# Patient Record
Sex: FEMALE | Race: WHITE | Employment: OTHER
[De-identification: names, ages, dates, MRNs, and addresses within clinical notes are randomized per-mention and may not be internally consistent; named-entity substitution may affect disease eponyms.]

---

## 2017-01-19 ENCOUNTER — TELEPHONE (OUTPATIENT)
Dept: FAMILY MEDICINE CLINIC | Facility: CLINIC | Age: 82
End: 2017-01-19

## 2017-03-06 RX ORDER — LOSARTAN POTASSIUM 50 MG/1
25 TABLET ORAL 2 TIMES DAILY
Qty: 90 TABLET | Refills: 1 | Status: SHIPPED | OUTPATIENT
Start: 2017-03-06 | End: 2017-07-31 | Stop reason: SDUPTHER

## 2017-03-06 RX ORDER — CLOBETASOL PROPIONATE 0.5 MG/G
OINTMENT TOPICAL
Qty: 15 G | Refills: 1 | Status: SHIPPED | OUTPATIENT
Start: 2017-03-06 | End: 2017-03-08 | Stop reason: SDUPTHER

## 2017-03-08 ENCOUNTER — OFFICE VISIT (OUTPATIENT)
Dept: FAMILY MEDICINE CLINIC | Facility: CLINIC | Age: 82
End: 2017-03-08

## 2017-03-08 VITALS
HEART RATE: 80 BPM | OXYGEN SATURATION: 98 % | WEIGHT: 153 LBS | SYSTOLIC BLOOD PRESSURE: 110 MMHG | DIASTOLIC BLOOD PRESSURE: 68 MMHG | BODY MASS INDEX: 28.91 KG/M2

## 2017-03-08 DIAGNOSIS — E78.00 PURE HYPERCHOLESTEROLEMIA: ICD-10-CM

## 2017-03-08 DIAGNOSIS — I10 ESSENTIAL HYPERTENSION: Primary | ICD-10-CM

## 2017-03-08 PROCEDURE — G8484 FLU IMMUNIZE NO ADMIN: HCPCS | Performed by: FAMILY MEDICINE

## 2017-03-08 PROCEDURE — 4040F PNEUMOC VAC/ADMIN/RCVD: CPT | Performed by: FAMILY MEDICINE

## 2017-03-08 PROCEDURE — G8427 DOCREV CUR MEDS BY ELIG CLIN: HCPCS | Performed by: FAMILY MEDICINE

## 2017-03-08 PROCEDURE — 1090F PRES/ABSN URINE INCON ASSESS: CPT | Performed by: FAMILY MEDICINE

## 2017-03-08 PROCEDURE — G8420 CALC BMI NORM PARAMETERS: HCPCS | Performed by: FAMILY MEDICINE

## 2017-03-08 PROCEDURE — 99213 OFFICE O/P EST LOW 20 MIN: CPT | Performed by: FAMILY MEDICINE

## 2017-03-08 PROCEDURE — 1123F ACP DISCUSS/DSCN MKR DOCD: CPT | Performed by: FAMILY MEDICINE

## 2017-03-08 PROCEDURE — 1036F TOBACCO NON-USER: CPT | Performed by: FAMILY MEDICINE

## 2017-03-08 ASSESSMENT — ENCOUNTER SYMPTOMS: SHORTNESS OF BREATH: 0

## 2017-03-10 RX ORDER — LEVOTHYROXINE SODIUM 0.07 MG/1
75 TABLET ORAL DAILY
Qty: 90 TABLET | Refills: 1 | Status: SHIPPED | OUTPATIENT
Start: 2017-03-10 | End: 2017-09-01 | Stop reason: SDUPTHER

## 2017-03-10 RX ORDER — ATORVASTATIN CALCIUM 10 MG/1
10 TABLET, FILM COATED ORAL NIGHTLY
Qty: 90 TABLET | Refills: 1 | Status: SHIPPED | OUTPATIENT
Start: 2017-03-10 | End: 2017-07-31 | Stop reason: SDUPTHER

## 2017-03-10 RX ORDER — CLOBETASOL PROPIONATE 0.5 MG/G
OINTMENT TOPICAL
Qty: 15 G | Refills: 1 | Status: SHIPPED | OUTPATIENT
Start: 2017-03-10 | End: 2018-11-27 | Stop reason: ALTCHOICE

## 2017-03-10 RX ORDER — ALLOPURINOL 100 MG/1
100 TABLET ORAL 2 TIMES DAILY
Qty: 180 TABLET | Refills: 1 | Status: SHIPPED | OUTPATIENT
Start: 2017-03-10 | End: 2017-09-20 | Stop reason: SDUPTHER

## 2017-03-19 ASSESSMENT — ENCOUNTER SYMPTOMS
VOMITING: 0
BACK PAIN: 0
ABDOMINAL PAIN: 0
CONSTIPATION: 0
DIARRHEA: 0
WHEEZING: 0
NAUSEA: 0
ABDOMINAL DISTENTION: 0
BLURRED VISION: 0
ORTHOPNEA: 0
TROUBLE SWALLOWING: 0
COUGH: 0
COLOR CHANGE: 0
PHOTOPHOBIA: 0

## 2017-04-04 ENCOUNTER — HOSPITAL ENCOUNTER (OUTPATIENT)
Dept: PHARMACY | Age: 82
Setting detail: THERAPIES SERIES
Discharge: HOME | End: 2017-04-04
Payer: MEDICARE

## 2017-04-04 VITALS
HEART RATE: 64 BPM | DIASTOLIC BLOOD PRESSURE: 78 MMHG | BODY MASS INDEX: 28.95 KG/M2 | WEIGHT: 153.2 LBS | SYSTOLIC BLOOD PRESSURE: 126 MMHG

## 2017-04-04 DIAGNOSIS — Z86.718 HISTORY OF DVT OF LOWER EXTREMITY: ICD-10-CM

## 2017-04-04 DIAGNOSIS — Z79.01 LONG TERM CURRENT USE OF ANTICOAGULANT THERAPY: ICD-10-CM

## 2017-04-04 DIAGNOSIS — I35.0 AORTIC STENOSIS, SEVERE: ICD-10-CM

## 2017-04-04 LAB — INR BLD: 2.7

## 2017-04-04 PROCEDURE — G0463 HOSPITAL OUTPT CLINIC VISIT: HCPCS

## 2017-04-04 PROCEDURE — 85610 PROTHROMBIN TIME: CPT

## 2017-05-02 ENCOUNTER — HOSPITAL ENCOUNTER (OUTPATIENT)
Dept: PHARMACY | Age: 82
Setting detail: THERAPIES SERIES
Discharge: HOME | End: 2017-05-02
Payer: MEDICARE

## 2017-05-02 VITALS
DIASTOLIC BLOOD PRESSURE: 62 MMHG | SYSTOLIC BLOOD PRESSURE: 116 MMHG | BODY MASS INDEX: 28.74 KG/M2 | HEART RATE: 60 BPM | WEIGHT: 152.1 LBS

## 2017-05-02 DIAGNOSIS — I35.0 AORTIC STENOSIS, SEVERE: ICD-10-CM

## 2017-05-02 DIAGNOSIS — Z86.718 HISTORY OF DVT OF LOWER EXTREMITY: ICD-10-CM

## 2017-05-02 DIAGNOSIS — Z79.01 LONG TERM CURRENT USE OF ANTICOAGULANT THERAPY: ICD-10-CM

## 2017-05-02 LAB — INR BLD: 2.1

## 2017-05-02 PROCEDURE — 99211 OFF/OP EST MAY X REQ PHY/QHP: CPT

## 2017-05-02 PROCEDURE — 85610 PROTHROMBIN TIME: CPT

## 2017-05-21 ENCOUNTER — APPOINTMENT (OUTPATIENT)
Dept: GENERAL RADIOLOGY | Age: 82
End: 2017-05-21
Payer: MEDICARE

## 2017-05-21 ENCOUNTER — HOSPITAL ENCOUNTER (EMERGENCY)
Age: 82
Discharge: HOME | End: 2017-05-21
Attending: FAMILY MEDICINE
Payer: MEDICARE

## 2017-05-21 VITALS
HEART RATE: 68 BPM | TEMPERATURE: 98.6 F | DIASTOLIC BLOOD PRESSURE: 50 MMHG | RESPIRATION RATE: 18 BRPM | SYSTOLIC BLOOD PRESSURE: 111 MMHG | OXYGEN SATURATION: 98 %

## 2017-05-21 DIAGNOSIS — J40 BRONCHITIS: Primary | ICD-10-CM

## 2017-05-21 LAB
ABSOLUTE EOS #: 0.1 K/UL (ref 0–0.4)
ABSOLUTE LYMPH #: 1.2 K/UL (ref 1.1–2.7)
ABSOLUTE MONO #: 0.6 K/UL (ref 0–1)
ANION GAP SERPL CALCULATED.3IONS-SCNC: 14 MMOL/L (ref 9–17)
BASOPHILS # BLD: 0 %
BASOPHILS ABSOLUTE: 0 K/UL (ref 0–0.2)
BNP INTERPRETATION: ABNORMAL
BUN BLDV-MCNC: 15 MG/DL (ref 8–23)
BUN/CREAT BLD: 11 (ref 9–20)
CALCIUM SERPL-MCNC: 9.7 MG/DL (ref 8.6–10.4)
CHLORIDE BLD-SCNC: 101 MMOL/L (ref 98–107)
CO2: 27 MMOL/L (ref 20–31)
CREAT SERPL-MCNC: 1.32 MG/DL (ref 0.5–0.9)
DIFFERENTIAL TYPE: YES
DIRECT EXAM: NORMAL
EOSINOPHILS RELATIVE PERCENT: 2 %
GFR AFRICAN AMERICAN: 46 ML/MIN
GFR NON-AFRICAN AMERICAN: 38 ML/MIN
GFR SERPL CREATININE-BSD FRML MDRD: ABNORMAL ML/MIN/{1.73_M2}
GFR SERPL CREATININE-BSD FRML MDRD: ABNORMAL ML/MIN/{1.73_M2}
GLUCOSE BLD-MCNC: 106 MG/DL (ref 70–99)
HCT VFR BLD CALC: 34.5 % (ref 36–46)
HEMOGLOBIN: 11.1 G/DL (ref 12–16)
INR BLD: 2.6
LYMPHOCYTES # BLD: 15 %
Lab: NORMAL
MCH RBC QN AUTO: 27.7 PG (ref 26–34)
MCHC RBC AUTO-ENTMCNC: 32.3 G/DL (ref 31–37)
MCV RBC AUTO: 85.7 FL (ref 80–100)
MONOCYTES # BLD: 8 %
PDW BLD-RTO: 17.8 % (ref 12.1–15.2)
PLATELET # BLD: 151 K/UL (ref 140–450)
PLATELET ESTIMATE: ABNORMAL
PMV BLD AUTO: ABNORMAL FL (ref 6–12)
POTASSIUM SERPL-SCNC: 3.9 MMOL/L (ref 3.7–5.3)
PRO-BNP: 2356 PG/ML
PROTHROMBIN TIME: 27.8 SEC (ref 9–11.6)
RBC # BLD: 4.02 M/UL (ref 4–5.2)
RBC # BLD: ABNORMAL 10*6/UL
SEG NEUTROPHILS: 75 %
SEGMENTED NEUTROPHILS ABSOLUTE COUNT: 6.2 K/UL (ref 2.5–7)
SODIUM BLD-SCNC: 142 MMOL/L (ref 135–144)
SPECIMEN DESCRIPTION: NORMAL
STATUS: NORMAL
WBC # BLD: 8.2 K/UL (ref 3.5–11)
WBC # BLD: ABNORMAL 10*3/UL

## 2017-05-21 PROCEDURE — 99285 EMERGENCY DEPT VISIT HI MDM: CPT

## 2017-05-21 PROCEDURE — 85610 PROTHROMBIN TIME: CPT

## 2017-05-21 PROCEDURE — 87651 STREP A DNA AMP PROBE: CPT

## 2017-05-21 PROCEDURE — 6370000000 HC RX 637 (ALT 250 FOR IP): Performed by: FAMILY MEDICINE

## 2017-05-21 PROCEDURE — 71020 XR CHEST STANDARD TWO VW: CPT

## 2017-05-21 PROCEDURE — 94664 DEMO&/EVAL PT USE INHALER: CPT

## 2017-05-21 PROCEDURE — 80048 BASIC METABOLIC PNL TOTAL CA: CPT

## 2017-05-21 PROCEDURE — 83880 ASSAY OF NATRIURETIC PEPTIDE: CPT

## 2017-05-21 PROCEDURE — 85025 COMPLETE CBC W/AUTO DIFF WBC: CPT

## 2017-05-21 RX ORDER — BENZONATATE 100 MG/1
100 CAPSULE ORAL ONCE
Status: COMPLETED | OUTPATIENT
Start: 2017-05-21 | End: 2017-05-21

## 2017-05-21 RX ORDER — IPRATROPIUM BROMIDE AND ALBUTEROL SULFATE 2.5; .5 MG/3ML; MG/3ML
1 SOLUTION RESPIRATORY (INHALATION) ONCE
Status: COMPLETED | OUTPATIENT
Start: 2017-05-21 | End: 2017-05-21

## 2017-05-21 RX ORDER — PREDNISONE 10 MG/1
10 TABLET ORAL DAILY
Qty: 5 TABLET | Refills: 0 | Status: SHIPPED | OUTPATIENT
Start: 2017-05-21 | End: 2017-05-26

## 2017-05-21 RX ORDER — PREDNISONE 20 MG/1
10 TABLET ORAL DAILY
Status: DISCONTINUED | OUTPATIENT
Start: 2017-05-21 | End: 2017-05-21 | Stop reason: HOSPADM

## 2017-05-21 RX ORDER — CEPHALEXIN 500 MG/1
500 CAPSULE ORAL 4 TIMES DAILY
Qty: 40 CAPSULE | Refills: 0 | Status: SHIPPED | OUTPATIENT
Start: 2017-05-21 | End: 2017-05-24 | Stop reason: ALTCHOICE

## 2017-05-21 RX ORDER — BENZONATATE 100 MG/1
100 CAPSULE ORAL EVERY 4 HOURS PRN
Qty: 30 CAPSULE | Refills: 0 | Status: SHIPPED | OUTPATIENT
Start: 2017-05-21 | End: 2017-05-28

## 2017-05-21 RX ORDER — CEPHALEXIN 500 MG/1
500 CAPSULE ORAL ONCE
Status: COMPLETED | OUTPATIENT
Start: 2017-05-21 | End: 2017-05-21

## 2017-05-21 RX ADMIN — CEPHALEXIN 500 MG: 500 CAPSULE ORAL at 18:49

## 2017-05-21 RX ADMIN — IPRATROPIUM BROMIDE AND ALBUTEROL SULFATE 1 AMPULE: .5; 3 SOLUTION RESPIRATORY (INHALATION) at 17:53

## 2017-05-21 RX ADMIN — PREDNISONE 10 MG: 20 TABLET ORAL at 18:49

## 2017-05-21 RX ADMIN — BENZONATATE 100 MG: 100 CAPSULE ORAL at 18:56

## 2017-05-22 LAB
DIRECT EXAM: NORMAL
DIRECT EXAM: NORMAL
Lab: NORMAL
SPECIMEN DESCRIPTION: NORMAL
SPECIMEN DESCRIPTION: NORMAL
STATUS: NORMAL

## 2017-05-23 ENCOUNTER — CARE COORDINATION (OUTPATIENT)
Dept: FAMILY MEDICINE CLINIC | Facility: CLINIC | Age: 82
End: 2017-05-23

## 2017-05-24 ENCOUNTER — OFFICE VISIT (OUTPATIENT)
Dept: FAMILY MEDICINE CLINIC | Age: 82
End: 2017-05-24
Payer: MEDICARE

## 2017-05-24 VITALS
HEART RATE: 85 BPM | OXYGEN SATURATION: 97 % | WEIGHT: 153 LBS | DIASTOLIC BLOOD PRESSURE: 74 MMHG | BODY MASS INDEX: 28.91 KG/M2 | SYSTOLIC BLOOD PRESSURE: 115 MMHG

## 2017-05-24 DIAGNOSIS — J40 BRONCHITIS: Primary | ICD-10-CM

## 2017-05-24 PROCEDURE — 1036F TOBACCO NON-USER: CPT | Performed by: FAMILY MEDICINE

## 2017-05-24 PROCEDURE — G8420 CALC BMI NORM PARAMETERS: HCPCS | Performed by: FAMILY MEDICINE

## 2017-05-24 PROCEDURE — 1123F ACP DISCUSS/DSCN MKR DOCD: CPT | Performed by: FAMILY MEDICINE

## 2017-05-24 PROCEDURE — 1090F PRES/ABSN URINE INCON ASSESS: CPT | Performed by: FAMILY MEDICINE

## 2017-05-24 PROCEDURE — 99213 OFFICE O/P EST LOW 20 MIN: CPT | Performed by: FAMILY MEDICINE

## 2017-05-24 PROCEDURE — 4040F PNEUMOC VAC/ADMIN/RCVD: CPT | Performed by: FAMILY MEDICINE

## 2017-05-24 PROCEDURE — G8427 DOCREV CUR MEDS BY ELIG CLIN: HCPCS | Performed by: FAMILY MEDICINE

## 2017-05-24 RX ORDER — AZITHROMYCIN 250 MG/1
TABLET, FILM COATED ORAL
Qty: 1 PACKET | Refills: 0 | Status: SHIPPED | OUTPATIENT
Start: 2017-05-24 | End: 2017-05-30 | Stop reason: ALTCHOICE

## 2017-05-30 ENCOUNTER — HOSPITAL ENCOUNTER (OUTPATIENT)
Dept: PHARMACY | Age: 82
Setting detail: THERAPIES SERIES
Discharge: HOME | End: 2017-05-30
Payer: MEDICARE

## 2017-05-30 VITALS
HEART RATE: 64 BPM | WEIGHT: 151.6 LBS | SYSTOLIC BLOOD PRESSURE: 108 MMHG | BODY MASS INDEX: 28.64 KG/M2 | DIASTOLIC BLOOD PRESSURE: 74 MMHG

## 2017-05-30 DIAGNOSIS — Z86.718 HISTORY OF DVT OF LOWER EXTREMITY: ICD-10-CM

## 2017-05-30 DIAGNOSIS — Z79.01 LONG TERM CURRENT USE OF ANTICOAGULANT THERAPY: ICD-10-CM

## 2017-05-30 DIAGNOSIS — I35.0 AORTIC STENOSIS, SEVERE: ICD-10-CM

## 2017-05-30 LAB — INR BLD: 2.4

## 2017-05-30 PROCEDURE — 85610 PROTHROMBIN TIME: CPT

## 2017-05-30 PROCEDURE — 99211 OFF/OP EST MAY X REQ PHY/QHP: CPT

## 2017-05-30 RX ORDER — BENZONATATE 100 MG/1
100 CAPSULE ORAL EVERY 4 HOURS PRN
COMMUNITY
End: 2017-06-27 | Stop reason: ALTCHOICE

## 2017-06-04 ASSESSMENT — ENCOUNTER SYMPTOMS
SHORTNESS OF BREATH: 0
NAUSEA: 0
COLOR CHANGE: 0
COUGH: 1
BACK PAIN: 0
DIARRHEA: 0
VOMITING: 0
TROUBLE SWALLOWING: 0
CONSTIPATION: 0
WHEEZING: 1
ABDOMINAL DISTENTION: 0
PHOTOPHOBIA: 0
ABDOMINAL PAIN: 0

## 2017-06-27 ENCOUNTER — HOSPITAL ENCOUNTER (OUTPATIENT)
Dept: PHARMACY | Age: 82
Setting detail: THERAPIES SERIES
Discharge: HOME | End: 2017-06-27
Payer: MEDICARE

## 2017-06-27 VITALS
DIASTOLIC BLOOD PRESSURE: 72 MMHG | SYSTOLIC BLOOD PRESSURE: 128 MMHG | HEART RATE: 68 BPM | BODY MASS INDEX: 28.76 KG/M2 | WEIGHT: 152.2 LBS

## 2017-06-27 DIAGNOSIS — Z86.718 HISTORY OF DVT OF LOWER EXTREMITY: ICD-10-CM

## 2017-06-27 DIAGNOSIS — I35.0 AORTIC STENOSIS, SEVERE: ICD-10-CM

## 2017-06-27 DIAGNOSIS — Z79.01 LONG TERM CURRENT USE OF ANTICOAGULANT THERAPY: ICD-10-CM

## 2017-06-27 LAB — INR BLD: 3

## 2017-06-27 PROCEDURE — 85610 PROTHROMBIN TIME: CPT

## 2017-06-27 PROCEDURE — 99211 OFF/OP EST MAY X REQ PHY/QHP: CPT

## 2017-07-18 ENCOUNTER — OFFICE VISIT (OUTPATIENT)
Dept: PRIMARY CARE CLINIC | Age: 82
End: 2017-07-18

## 2017-07-18 ENCOUNTER — HOSPITAL ENCOUNTER (OUTPATIENT)
Dept: NON INVASIVE DIAGNOSTICS | Age: 82
Discharge: HOME | End: 2017-07-18
Attending: INTERNAL MEDICINE | Admitting: INTERNAL MEDICINE
Payer: MEDICARE

## 2017-07-18 DIAGNOSIS — H57.11 EYE PAIN, RIGHT: Primary | ICD-10-CM

## 2017-07-18 DIAGNOSIS — I35.0 AORTIC STENOSIS, SEVERE: ICD-10-CM

## 2017-07-18 DIAGNOSIS — E03.9 HYPOTHYROIDISM, UNSPECIFIED TYPE: ICD-10-CM

## 2017-07-18 LAB
LV EF: 60 %
LVEF MODALITY: NORMAL

## 2017-07-18 PROCEDURE — 1036F TOBACCO NON-USER: CPT | Performed by: NURSE PRACTITIONER

## 2017-07-18 PROCEDURE — 93306 TTE W/DOPPLER COMPLETE: CPT

## 2017-07-18 PROCEDURE — 99999 PR OFFICE/OUTPT VISIT,PROCEDURE ONLY: CPT | Performed by: NURSE PRACTITIONER

## 2017-07-18 RX ORDER — DILTIAZEM HYDROCHLORIDE 120 MG/1
CAPSULE, COATED, EXTENDED RELEASE ORAL
Qty: 30 CAPSULE | Refills: 11 | Status: SHIPPED | OUTPATIENT
Start: 2017-07-18 | End: 2017-07-19 | Stop reason: SDUPTHER

## 2017-07-19 RX ORDER — DILTIAZEM HYDROCHLORIDE 120 MG/1
CAPSULE, COATED, EXTENDED RELEASE ORAL
Qty: 30 CAPSULE | Refills: 11 | OUTPATIENT
Start: 2017-07-19 | End: 2018-06-12 | Stop reason: SDUPTHER

## 2017-07-25 ENCOUNTER — HOSPITAL ENCOUNTER (OUTPATIENT)
Dept: PHARMACY | Age: 82
Setting detail: THERAPIES SERIES
Discharge: HOME | End: 2017-07-25
Payer: MEDICARE

## 2017-07-25 ENCOUNTER — TELEPHONE (OUTPATIENT)
Dept: CARDIOLOGY CLINIC | Age: 82
End: 2017-07-25

## 2017-07-25 ENCOUNTER — HOSPITAL ENCOUNTER (OUTPATIENT)
Age: 82
Discharge: HOME | End: 2017-07-25
Payer: MEDICARE

## 2017-07-25 VITALS
BODY MASS INDEX: 28.42 KG/M2 | DIASTOLIC BLOOD PRESSURE: 60 MMHG | SYSTOLIC BLOOD PRESSURE: 136 MMHG | WEIGHT: 150.4 LBS | HEART RATE: 68 BPM

## 2017-07-25 DIAGNOSIS — Z95.1 HX OF CABG: ICD-10-CM

## 2017-07-25 DIAGNOSIS — Z86.718 HISTORY OF DVT OF LOWER EXTREMITY: ICD-10-CM

## 2017-07-25 DIAGNOSIS — E55.9 VITAMIN D DEFICIENCY DISEASE: ICD-10-CM

## 2017-07-25 DIAGNOSIS — Z86.79 PERSONAL HISTORY OF ATRIAL FLUTTER: ICD-10-CM

## 2017-07-25 DIAGNOSIS — I10 ESSENTIAL HYPERTENSION: Primary | ICD-10-CM

## 2017-07-25 DIAGNOSIS — I35.0 AORTIC STENOSIS, SEVERE: ICD-10-CM

## 2017-07-25 DIAGNOSIS — Z95.2 H/O AORTIC VALVE REPLACEMENT: ICD-10-CM

## 2017-07-25 DIAGNOSIS — E03.9 HYPOTHYROIDISM, UNSPECIFIED TYPE: ICD-10-CM

## 2017-07-25 DIAGNOSIS — N18.9 CHRONIC RENAL INSUFFICIENCY, UNSPECIFIED STAGE: ICD-10-CM

## 2017-07-25 DIAGNOSIS — Z79.01 LONG TERM CURRENT USE OF ANTICOAGULANT THERAPY: ICD-10-CM

## 2017-07-25 LAB
ABSOLUTE EOS #: 0.2 K/UL (ref 0–0.4)
ABSOLUTE LYMPH #: 1.6 K/UL (ref 1.1–2.7)
ABSOLUTE MONO #: 0.6 K/UL (ref 0–1)
ALBUMIN SERPL-MCNC: 4.5 G/DL (ref 3.5–5.2)
ALBUMIN/GLOBULIN RATIO: ABNORMAL (ref 1–2.5)
ALP BLD-CCNC: 97 U/L (ref 35–104)
ALT SERPL-CCNC: 7 U/L (ref 5–33)
ANION GAP SERPL CALCULATED.3IONS-SCNC: 17 MMOL/L (ref 9–17)
AST SERPL-CCNC: 19 U/L
BASOPHILS # BLD: 1 %
BASOPHILS ABSOLUTE: 0.1 K/UL (ref 0–0.2)
BILIRUB SERPL-MCNC: 0.76 MG/DL (ref 0.3–1.2)
BUN BLDV-MCNC: 16 MG/DL (ref 8–23)
BUN/CREAT BLD: 16 (ref 9–20)
CALCIUM SERPL-MCNC: 9.6 MG/DL (ref 8.6–10.4)
CHLORIDE BLD-SCNC: 101 MMOL/L (ref 98–107)
CHOLESTEROL/HDL RATIO: 1.9
CHOLESTEROL: 149 MG/DL
CO2: 25 MMOL/L (ref 20–31)
CREAT SERPL-MCNC: 0.98 MG/DL (ref 0.5–0.9)
DIFFERENTIAL TYPE: YES
EKG ATRIAL RATE: 227 BPM
EKG Q-T INTERVAL: 452 MS
EKG QRS DURATION: 132 MS
EKG QTC CALCULATION (BAZETT): 508 MS
EKG R AXIS: 68 DEGREES
EKG T AXIS: 29 DEGREES
EKG VENTRICULAR RATE: 76 BPM
EOSINOPHILS RELATIVE PERCENT: 2 %
GFR AFRICAN AMERICAN: >60 ML/MIN
GFR NON-AFRICAN AMERICAN: 54 ML/MIN
GFR SERPL CREATININE-BSD FRML MDRD: ABNORMAL ML/MIN/{1.73_M2}
GFR SERPL CREATININE-BSD FRML MDRD: ABNORMAL ML/MIN/{1.73_M2}
GLUCOSE BLD-MCNC: 93 MG/DL (ref 70–99)
HCT VFR BLD CALC: 37.6 % (ref 36–46)
HDLC SERPL-MCNC: 80 MG/DL
HEMOGLOBIN: 12 G/DL (ref 12–16)
INR BLD: 2.6
LDL CHOLESTEROL: 54 MG/DL (ref 0–130)
LYMPHOCYTES # BLD: 21 %
MAGNESIUM: 1.5 MG/DL (ref 1.6–2.6)
MCH RBC QN AUTO: 28 PG (ref 26–34)
MCHC RBC AUTO-ENTMCNC: 32 G/DL (ref 31–37)
MCV RBC AUTO: 87.3 FL (ref 80–100)
MONOCYTES # BLD: 9 %
PATIENT FASTING?: YES
PDW BLD-RTO: 18.4 % (ref 12.1–15.2)
PLATELET # BLD: 171 K/UL (ref 140–450)
PLATELET ESTIMATE: ABNORMAL
PMV BLD AUTO: ABNORMAL FL (ref 6–12)
POTASSIUM SERPL-SCNC: 4.5 MMOL/L (ref 3.7–5.3)
RBC # BLD: 4.31 M/UL (ref 4–5.2)
RBC # BLD: ABNORMAL 10*6/UL
SEG NEUTROPHILS: 67 %
SEGMENTED NEUTROPHILS ABSOLUTE COUNT: 4.9 K/UL (ref 2.5–7)
SODIUM BLD-SCNC: 143 MMOL/L (ref 135–144)
TOTAL PROTEIN: 7.5 G/DL (ref 6.4–8.3)
TRIGL SERPL-MCNC: 75 MG/DL
TSH SERPL DL<=0.05 MIU/L-ACNC: 0.85 MIU/L (ref 0.3–5)
VITAMIN D 25-HYDROXY: 41.4 NG/ML (ref 30–100)
VLDLC SERPL CALC-MCNC: NORMAL MG/DL (ref 1–30)
WBC # BLD: 7.3 K/UL (ref 3.5–11)
WBC # BLD: ABNORMAL 10*3/UL

## 2017-07-25 PROCEDURE — 36415 COLL VENOUS BLD VENIPUNCTURE: CPT

## 2017-07-25 PROCEDURE — 80061 LIPID PANEL: CPT

## 2017-07-25 PROCEDURE — 85610 PROTHROMBIN TIME: CPT

## 2017-07-25 PROCEDURE — 99211 OFF/OP EST MAY X REQ PHY/QHP: CPT

## 2017-07-25 PROCEDURE — 82306 VITAMIN D 25 HYDROXY: CPT

## 2017-07-25 PROCEDURE — 93005 ELECTROCARDIOGRAM TRACING: CPT

## 2017-07-25 PROCEDURE — 84443 ASSAY THYROID STIM HORMONE: CPT

## 2017-07-25 PROCEDURE — 83735 ASSAY OF MAGNESIUM: CPT

## 2017-07-25 PROCEDURE — 85025 COMPLETE CBC W/AUTO DIFF WBC: CPT

## 2017-07-25 PROCEDURE — 80053 COMPREHEN METABOLIC PANEL: CPT

## 2017-07-31 ENCOUNTER — OFFICE VISIT (OUTPATIENT)
Dept: CARDIOLOGY CLINIC | Age: 82
End: 2017-07-31
Payer: MEDICARE

## 2017-07-31 VITALS
WEIGHT: 151 LBS | HEART RATE: 68 BPM | BODY MASS INDEX: 28.53 KG/M2 | DIASTOLIC BLOOD PRESSURE: 60 MMHG | OXYGEN SATURATION: 96 % | SYSTOLIC BLOOD PRESSURE: 130 MMHG

## 2017-07-31 DIAGNOSIS — E03.9 HYPOTHYROIDISM, UNSPECIFIED TYPE: Primary | ICD-10-CM

## 2017-07-31 DIAGNOSIS — Z79.01 LONG TERM (CURRENT) USE OF ANTICOAGULANTS: ICD-10-CM

## 2017-07-31 DIAGNOSIS — I35.0 AORTIC STENOSIS, SEVERE: ICD-10-CM

## 2017-07-31 DIAGNOSIS — E55.9 VITAMIN D DEFICIENCY DISEASE: ICD-10-CM

## 2017-07-31 DIAGNOSIS — I10 ESSENTIAL HYPERTENSION, BENIGN: ICD-10-CM

## 2017-07-31 DIAGNOSIS — Z95.2 H/O AORTIC VALVE REPLACEMENT: ICD-10-CM

## 2017-07-31 DIAGNOSIS — I10 ESSENTIAL HYPERTENSION: ICD-10-CM

## 2017-07-31 DIAGNOSIS — N18.9 CHRONIC RENAL INSUFFICIENCY, UNSPECIFIED STAGE: ICD-10-CM

## 2017-07-31 PROCEDURE — 1090F PRES/ABSN URINE INCON ASSESS: CPT | Performed by: INTERNAL MEDICINE

## 2017-07-31 PROCEDURE — 1123F ACP DISCUSS/DSCN MKR DOCD: CPT | Performed by: INTERNAL MEDICINE

## 2017-07-31 PROCEDURE — G8419 CALC BMI OUT NRM PARAM NOF/U: HCPCS | Performed by: INTERNAL MEDICINE

## 2017-07-31 PROCEDURE — 1036F TOBACCO NON-USER: CPT | Performed by: INTERNAL MEDICINE

## 2017-07-31 PROCEDURE — 99214 OFFICE O/P EST MOD 30 MIN: CPT | Performed by: INTERNAL MEDICINE

## 2017-07-31 PROCEDURE — 4040F PNEUMOC VAC/ADMIN/RCVD: CPT | Performed by: INTERNAL MEDICINE

## 2017-07-31 PROCEDURE — G8427 DOCREV CUR MEDS BY ELIG CLIN: HCPCS | Performed by: INTERNAL MEDICINE

## 2017-07-31 RX ORDER — LOSARTAN POTASSIUM 50 MG/1
25 TABLET ORAL 2 TIMES DAILY
Qty: 90 TABLET | Refills: 3 | Status: SHIPPED | OUTPATIENT
Start: 2017-07-31 | End: 2017-12-05 | Stop reason: SDUPTHER

## 2017-07-31 RX ORDER — ATORVASTATIN CALCIUM 10 MG/1
10 TABLET, FILM COATED ORAL NIGHTLY
Qty: 90 TABLET | Refills: 3 | Status: SHIPPED | OUTPATIENT
Start: 2017-07-31 | End: 2017-12-05 | Stop reason: SDUPTHER

## 2017-07-31 RX ORDER — WARFARIN SODIUM 2 MG/1
TABLET ORAL
Qty: 180 TABLET | Refills: 3 | Status: SHIPPED | OUTPATIENT
Start: 2017-07-31 | End: 2017-11-28 | Stop reason: DRUGHIGH

## 2017-08-29 ENCOUNTER — HOSPITAL ENCOUNTER (OUTPATIENT)
Dept: PHARMACY | Age: 82
Setting detail: THERAPIES SERIES
Discharge: HOME | End: 2017-08-29
Payer: MEDICARE

## 2017-08-29 VITALS
WEIGHT: 152.6 LBS | HEART RATE: 60 BPM | BODY MASS INDEX: 28.83 KG/M2 | DIASTOLIC BLOOD PRESSURE: 64 MMHG | SYSTOLIC BLOOD PRESSURE: 122 MMHG

## 2017-08-29 DIAGNOSIS — Z79.01 LONG TERM CURRENT USE OF ANTICOAGULANT THERAPY: ICD-10-CM

## 2017-08-29 DIAGNOSIS — I35.0 AORTIC STENOSIS, SEVERE: ICD-10-CM

## 2017-08-29 DIAGNOSIS — Z86.718 HISTORY OF DVT OF LOWER EXTREMITY: ICD-10-CM

## 2017-08-29 LAB — INR BLD: 2.1

## 2017-08-29 PROCEDURE — 99211 OFF/OP EST MAY X REQ PHY/QHP: CPT

## 2017-08-29 PROCEDURE — 85610 PROTHROMBIN TIME: CPT

## 2017-08-29 RX ORDER — ACETAMINOPHEN 500 MG
500-1000 TABLET ORAL EVERY 6 HOURS PRN
COMMUNITY

## 2017-09-01 RX ORDER — LEVOTHYROXINE SODIUM 0.07 MG/1
75 TABLET ORAL DAILY
Qty: 90 TABLET | Refills: 0 | Status: SHIPPED | OUTPATIENT
Start: 2017-09-01 | End: 2017-12-05 | Stop reason: SDUPTHER

## 2017-09-08 PROBLEM — I48.92 ATRIAL FLUTTER (HCC): Status: ACTIVE | Noted: 2017-09-08

## 2017-09-20 RX ORDER — ALLOPURINOL 100 MG/1
100 TABLET ORAL 2 TIMES DAILY
Qty: 180 TABLET | Refills: 1 | Status: SHIPPED | OUTPATIENT
Start: 2017-09-20 | End: 2017-12-05 | Stop reason: SDUPTHER

## 2017-09-26 ENCOUNTER — HOSPITAL ENCOUNTER (OUTPATIENT)
Dept: PHARMACY | Age: 82
Setting detail: THERAPIES SERIES
Discharge: HOME | End: 2017-09-26
Payer: MEDICARE

## 2017-09-26 VITALS
SYSTOLIC BLOOD PRESSURE: 134 MMHG | DIASTOLIC BLOOD PRESSURE: 72 MMHG | BODY MASS INDEX: 28.55 KG/M2 | HEART RATE: 64 BPM | WEIGHT: 151.1 LBS

## 2017-09-26 DIAGNOSIS — Z86.718 HISTORY OF DVT OF LOWER EXTREMITY: ICD-10-CM

## 2017-09-26 DIAGNOSIS — Z79.01 LONG TERM CURRENT USE OF ANTICOAGULANT THERAPY: ICD-10-CM

## 2017-09-26 DIAGNOSIS — I35.0 AORTIC STENOSIS, SEVERE: ICD-10-CM

## 2017-09-26 LAB — INR BLD: 1.8

## 2017-09-26 PROCEDURE — 85610 PROTHROMBIN TIME: CPT

## 2017-09-26 PROCEDURE — 99211 OFF/OP EST MAY X REQ PHY/QHP: CPT

## 2017-10-24 ENCOUNTER — HOSPITAL ENCOUNTER (OUTPATIENT)
Dept: PHARMACY | Age: 82
Setting detail: THERAPIES SERIES
Discharge: HOME | End: 2017-10-24
Payer: MEDICARE

## 2017-10-24 VITALS
BODY MASS INDEX: 28.3 KG/M2 | DIASTOLIC BLOOD PRESSURE: 56 MMHG | SYSTOLIC BLOOD PRESSURE: 112 MMHG | WEIGHT: 149.8 LBS | HEART RATE: 54 BPM

## 2017-10-24 DIAGNOSIS — I35.0 AORTIC STENOSIS, SEVERE: ICD-10-CM

## 2017-10-24 DIAGNOSIS — Z86.718 HISTORY OF DVT OF LOWER EXTREMITY: ICD-10-CM

## 2017-10-24 DIAGNOSIS — Z79.01 LONG TERM CURRENT USE OF ANTICOAGULANT THERAPY: ICD-10-CM

## 2017-10-24 LAB — INR BLD: 2.6

## 2017-10-24 PROCEDURE — 99211 OFF/OP EST MAY X REQ PHY/QHP: CPT

## 2017-10-24 PROCEDURE — 85610 PROTHROMBIN TIME: CPT

## 2017-10-24 NOTE — PATIENT INSTRUCTIONS
Continue current dose of warfarin as instructed on dosing calendar provided. Continue to monitor urine and stool for signs and symptoms of bleeding. Please notify the clinic of any medication changes. Please remember to bring all medications (both prescription and OTC) to your next visit. Kindly notify the clinic if you are unable to make to your next appointment.

## 2017-11-28 ENCOUNTER — HOSPITAL ENCOUNTER (OUTPATIENT)
Dept: PHARMACY | Age: 82
Setting detail: THERAPIES SERIES
Discharge: HOME | End: 2017-11-28
Payer: MEDICARE

## 2017-11-28 VITALS
WEIGHT: 148.8 LBS | BODY MASS INDEX: 28.12 KG/M2 | DIASTOLIC BLOOD PRESSURE: 64 MMHG | HEART RATE: 64 BPM | SYSTOLIC BLOOD PRESSURE: 130 MMHG

## 2017-11-28 DIAGNOSIS — I35.0 AORTIC STENOSIS, SEVERE: ICD-10-CM

## 2017-11-28 DIAGNOSIS — Z86.718 HISTORY OF DVT OF LOWER EXTREMITY: ICD-10-CM

## 2017-11-28 DIAGNOSIS — Z79.01 LONG TERM CURRENT USE OF ANTICOAGULANT THERAPY: ICD-10-CM

## 2017-11-28 LAB — INR BLD: 3.3

## 2017-11-28 PROCEDURE — 85610 PROTHROMBIN TIME: CPT

## 2017-11-28 PROCEDURE — 99211 OFF/OP EST MAY X REQ PHY/QHP: CPT

## 2017-11-28 RX ORDER — CHOLECALCIFEROL (VITAMIN D3) 125 MCG
1 CAPSULE ORAL DAILY
COMMUNITY
End: 2020-01-01 | Stop reason: CLARIF

## 2017-11-28 RX ORDER — WARFARIN SODIUM 2 MG/1
TABLET ORAL
Qty: 180 TABLET | Refills: 3 | Status: SHIPPED
Start: 2017-11-28 | End: 2018-09-04 | Stop reason: SDUPTHER

## 2017-11-28 RX ORDER — WARFARIN SODIUM 5 MG/1
5 TABLET ORAL WEEKLY
Qty: 30 TABLET | Refills: 11 | Status: SHIPPED
Start: 2017-11-28 | End: 2017-12-12 | Stop reason: SDUPTHER

## 2017-12-05 ENCOUNTER — OFFICE VISIT (OUTPATIENT)
Dept: FAMILY MEDICINE CLINIC | Age: 82
End: 2017-12-05
Payer: MEDICARE

## 2017-12-05 VITALS
SYSTOLIC BLOOD PRESSURE: 114 MMHG | WEIGHT: 150 LBS | HEART RATE: 74 BPM | DIASTOLIC BLOOD PRESSURE: 68 MMHG | OXYGEN SATURATION: 98 % | BODY MASS INDEX: 28.34 KG/M2

## 2017-12-05 DIAGNOSIS — I48.3 TYPICAL ATRIAL FLUTTER (HCC): ICD-10-CM

## 2017-12-05 DIAGNOSIS — I10 ESSENTIAL HYPERTENSION: Primary | ICD-10-CM

## 2017-12-05 DIAGNOSIS — N18.9 CHRONIC RENAL IMPAIRMENT, UNSPECIFIED CKD STAGE: ICD-10-CM

## 2017-12-05 DIAGNOSIS — E03.9 ACQUIRED HYPOTHYROIDISM: ICD-10-CM

## 2017-12-05 PROCEDURE — 4040F PNEUMOC VAC/ADMIN/RCVD: CPT | Performed by: FAMILY MEDICINE

## 2017-12-05 PROCEDURE — 99214 OFFICE O/P EST MOD 30 MIN: CPT | Performed by: FAMILY MEDICINE

## 2017-12-05 PROCEDURE — G8484 FLU IMMUNIZE NO ADMIN: HCPCS | Performed by: FAMILY MEDICINE

## 2017-12-05 PROCEDURE — 1090F PRES/ABSN URINE INCON ASSESS: CPT | Performed by: FAMILY MEDICINE

## 2017-12-05 PROCEDURE — 1036F TOBACCO NON-USER: CPT | Performed by: FAMILY MEDICINE

## 2017-12-05 PROCEDURE — G8427 DOCREV CUR MEDS BY ELIG CLIN: HCPCS | Performed by: FAMILY MEDICINE

## 2017-12-05 PROCEDURE — G8417 CALC BMI ABV UP PARAM F/U: HCPCS | Performed by: FAMILY MEDICINE

## 2017-12-05 PROCEDURE — 1123F ACP DISCUSS/DSCN MKR DOCD: CPT | Performed by: FAMILY MEDICINE

## 2017-12-05 RX ORDER — ALLOPURINOL 100 MG/1
100 TABLET ORAL 2 TIMES DAILY
Qty: 180 TABLET | Refills: 3 | Status: SHIPPED | OUTPATIENT
Start: 2017-12-05 | End: 2018-11-27 | Stop reason: SDUPTHER

## 2017-12-05 RX ORDER — LOSARTAN POTASSIUM 50 MG/1
25 TABLET ORAL 2 TIMES DAILY
Qty: 90 TABLET | Refills: 3 | Status: SHIPPED | OUTPATIENT
Start: 2017-12-05 | End: 2018-07-30 | Stop reason: SDUPTHER

## 2017-12-05 RX ORDER — ATORVASTATIN CALCIUM 10 MG/1
10 TABLET, FILM COATED ORAL NIGHTLY
Qty: 90 TABLET | Refills: 3 | Status: SHIPPED | OUTPATIENT
Start: 2017-12-05 | End: 2018-07-30 | Stop reason: SDUPTHER

## 2017-12-05 RX ORDER — LEVOTHYROXINE SODIUM 0.07 MG/1
75 TABLET ORAL DAILY
Qty: 90 TABLET | Refills: 3 | Status: SHIPPED | OUTPATIENT
Start: 2017-12-05 | End: 2018-11-27 | Stop reason: SDUPTHER

## 2017-12-05 NOTE — PROGRESS NOTES
HPI Notes    Name: Shae Chew  : 1/10/1931         Chief Complaint:     Chief Complaint   Patient presents with    Hypertension    Hypothyroidism       History of Present Illness:      Shae Chew is a 80 y.o.  female who presents with Hypertension and Hypothyroidism      Hypertension   This is a chronic problem. The current episode started more than 1 year ago. The problem is unchanged. The problem is controlled. Pertinent negatives include no anxiety, blurred vision, chest pain, headaches, malaise/fatigue, neck pain, orthopnea, palpitations, peripheral edema, PND, shortness of breath or sweats. Risk factors for coronary artery disease include post-menopausal state. Past treatments include angiotensin blockers, beta blockers and calcium channel blockers. Patient with a history of renal insufficiency. Patient has had a stable creatinine, but is asking for a recheck to make sure that her kidney function is staying stable. Patient denies any change in her frequency of urination. Patient denies any other symptoms. Patient also with a history of hypothyroidism. Patient currently takes Synthroid. Patient denies any weight gain or weight loss. Patient denies any palpitations. Patient denies any constipation or diarrhea. Patient also with a history of atrial flutter. Patient is currently on Coumadin for this. Patient denies any problems with this. Patient sees cardiology also for this. No other acute problems or complaints.   Past Medical History:     Past Medical History:   Diagnosis Date    Acute renal failure (ARF) ([facility] [address]     2014    Anemia     Aortic stenosis, severe     2014 echo    Arthritis     right shoulder    H/O aortic valve replacement 14    [facility] Dr. Jules Doshi Hx of CABG 14    Hypertension     Hypothyroidism     Osteoporosis       Reviewed all health maintenance requirements and ordered appropriate tests  Health Maintenance Due   Topic Date Due    DTaP/Tdap/Td vaccine (1 - Tdap) 01/10/1950       Past Surgical History:     Past Surgical History:   Procedure Laterality Date    AORTIC VALVE REPLACEMENT  7/29/14    [facility], Dr. Romana Greulich Left 06/04/14    [facility] Dr. Larson Age 60% disease in the ostium of the right coronary artery followed by a 60% lesion in the midportion. Unremarkable left anterior descending &circumflex. Normal left ventricular function,ejection fraction of 60%. Normal left ventricular function, ejection fraction of 60%. Severe aortic stenosis with a 100 mm gradient across the aortic valve with an aortic valve area of    CARDIAC CATHETERIZATION Left 06/04/14    0.4 cm2. Moderate pulmonary hypertension with a pulmonary artery pressure of 50/30.  CARPAL TUNNEL RELEASE      bilateral     HYSTERECTOMY      PARTIAL HYSTERECTOMY          Medications:       Prior to Admission medications    Medication Sig Start Date End Date Taking? Authorizing Provider   allopurinol (ZYLOPRIM) 100 MG tablet Take 1 tablet by mouth 2 times daily 12/5/17  Yes Sunshine Mckeon DO   levothyroxine (SYNTHROID) 75 MCG tablet Take 1 tablet by mouth Daily 12/5/17  Yes Sunshine Mckeon DO   losartan (COZAAR) 50 MG tablet Take 0.5 tablets by mouth 2 times daily 12/5/17  Yes Sunshine Mckeon DO   metoprolol tartrate (LOPRESSOR) 25 MG tablet Take 1 tablet by mouth 2 times daily 12/5/17  Yes Sunshine Mckeon DO   atorvastatin (LIPITOR) 10 MG tablet Take 1 tablet by mouth nightly 12/5/17  Yes Sunshine Mckeon DO   Cholecalciferol (VITAMIN D3) 2000 units TABS Take 1 tablet by mouth daily   Yes Historical Provider, MD   warfarin (COUMADIN) 2 MG tablet Take 2 tablets (4 mg total) on Tuesdays, Wednesdays, Thursdays, Fridays, Saturdays, and Sundays and warfarin 5 mg on Mondays.  11/28/17  Yes To Quezada MD   NONFORMULARY Take 1 capsule by mouth 2 times daily [facility] Formula with Lutein contains vitamin A 1,000 IU,vitamin c 200 mg, vitamin e 60 IU, zinc 40 mg, selenium 55 mcg, copper 2 mg, lutein 2 mg per tablet 8/7/17  Yes Historical Provider, MD   acetaminophen (TYLENOL) 500 MG tablet Take 500-1,000 mg by mouth every 6 hours as needed for Pain   Yes Historical Provider, MD   ARTIFICIAL TEAR SOLUTION OP Apply 2 drops to eye as needed (dry eyes) 7/18/17  Yes Kimi ROCHA Procaccjames   diltiazem (CARDIZEM CD) 120 MG extended release capsule take 1 capsule by mouth once daily 7/19/17  Yes Meri Harrington MD   clobetasol (TEMOVATE) 0.05 % ointment Apply topically prn 3/10/17  Yes Gifty Speak, DO   aspirin 81 MG EC tablet Take 81 mg by mouth daily. Yes Historical Provider, MD   docusate sodium (COLACE) 100 MG capsule Take 100-200 mg by mouth daily as needed for Constipation    Yes Historical Provider, MD   warfarin (COUMADIN) 5 MG tablet Take 1 tablet daily on Mondays only and take 4 mg daily all other days or as directed by [facility]ication [facility]. 12/12/17   Meri Harrington MD        Allergies:       Codeine    Social History:     Tobacco:    reports that she quit smoking about 35 years ago. Her smoking use included Cigarettes. She has a 35.00 pack-year smoking history. She has never used smokeless tobacco.  Alcohol:      reports that she does not drink alcohol. Drug Use:  reports that she does not use drugs. Family History:     Family History   Problem Relation Age of Onset    Heart Disease Brother        Review of Systems:     Positive and Negative as described in HPI    Review of Systems   Constitutional: Negative for activity change, appetite change, fever, malaise/fatigue and unexpected weight change. HENT: Negative for ear discharge, ear pain and trouble swallowing. Eyes: Negative for blurred vision, photophobia and visual disturbance. Respiratory: Negative for cough, shortness of breath and wheezing. Cardiovascular: Negative for chest pain, palpitations, orthopnea and PND. Gastrointestinal: Negative for abdominal distention, abdominal pain, constipation, diarrhea, nausea and vomiting. Endocrine: Negative for polydipsia, polyphagia and polyuria. Genitourinary: Negative for frequency and urgency. Musculoskeletal: Negative for arthralgias, back pain, gait problem, joint swelling, myalgias, neck pain and neck stiffness. Skin: Negative for color change and rash. Allergic/Immunologic: Negative for environmental allergies and food allergies. Neurological: Negative for dizziness, syncope, weakness, light-headedness and headaches. Hematological: Negative for adenopathy. Bruises/bleeds easily (secondary to coumadin). Psychiatric/Behavioral: Negative for dysphoric mood. The patient is not nervous/anxious. Physical Exam:     Physical Exam   Constitutional: She is oriented to person, place, and time. She appears well-developed and well-nourished. HENT:   Head: Normocephalic and atraumatic. Right Ear: External ear normal.   Left Ear: External ear normal.   Eyes: Conjunctivae and EOM are normal.   Neck: Normal range of motion. Neck supple. No thyromegaly present. Cardiovascular: Normal rate, regular rhythm and normal heart sounds. Exam reveals no gallop and no friction rub. No murmur heard. Pulmonary/Chest: Effort normal and breath sounds normal. No respiratory distress. She has no wheezes. She has no rales. She exhibits no tenderness. Abdominal: Soft. Bowel sounds are normal. She exhibits no distension. There is no tenderness. There is no rebound and no guarding. Musculoskeletal: Normal range of motion. She exhibits no edema. Lymphadenopathy:     She has no cervical adenopathy. Neurological: She is alert and oriented to person, place, and time. She exhibits normal muscle tone. Coordination normal.   Skin: Skin is warm and dry. No rash noted. No erythema. Psychiatric: She has a normal mood and affect.  Her behavior is normal. Judgment and thought Sig: Take 0.5 tablets by mouth 2 times daily    metoprolol tartrate (LOPRESSOR) 25 MG tablet 180 tablet 3     Sig: Take 1 tablet by mouth 2 times daily    atorvastatin (LIPITOR) 10 MG tablet 90 tablet 3     Sig: Take 1 tablet by mouth nightly         Enid Garza received counseling on the following healthy behaviors: nutrition, exercise and medication adherence  Reviewed prior labs and health maintenance. Continue current medications, diet and exercise. Discussed use, benefit, and side effects of prescribed medications. Barriers to medication compliance addressed. Patient given educational materials - see patient instructions. All patient questions answered. Patient voiced understanding.

## 2017-12-12 ENCOUNTER — HOSPITAL ENCOUNTER (OUTPATIENT)
Dept: PHARMACY | Age: 82
Setting detail: THERAPIES SERIES
Discharge: HOME | End: 2017-12-12
Payer: MEDICARE

## 2017-12-12 VITALS
BODY MASS INDEX: 28.36 KG/M2 | SYSTOLIC BLOOD PRESSURE: 150 MMHG | WEIGHT: 150.1 LBS | HEART RATE: 60 BPM | DIASTOLIC BLOOD PRESSURE: 72 MMHG

## 2017-12-12 DIAGNOSIS — Z86.718 HISTORY OF DVT OF LOWER EXTREMITY: ICD-10-CM

## 2017-12-12 DIAGNOSIS — Z79.01 LONG TERM CURRENT USE OF ANTICOAGULANT THERAPY: ICD-10-CM

## 2017-12-12 DIAGNOSIS — I35.0 AORTIC STENOSIS, SEVERE: ICD-10-CM

## 2017-12-12 LAB — INR BLD: 2.9

## 2017-12-12 PROCEDURE — 99211 OFF/OP EST MAY X REQ PHY/QHP: CPT

## 2017-12-12 PROCEDURE — 85610 PROTHROMBIN TIME: CPT

## 2017-12-12 RX ORDER — WARFARIN SODIUM 5 MG/1
TABLET ORAL
Qty: 30 TABLET | Refills: 11 | OUTPATIENT
Start: 2017-12-12 | End: 2018-07-30 | Stop reason: SDUPTHER

## 2017-12-12 NOTE — PROGRESS NOTES
Fingerstick INR drawn per clinic protocol. Patient states no visible blood in urine and no black tarry stool. Denies any missed doses of warfarin. No change in other maintenance medications or in diet. All medeication bottles reviewed for accuracy. Pat saw Dr. Kenney Vu on 12/5/17 and will repeat BMP in January 2018. Hamzah Driver states she has not taken APAP in a long time. Will continue current weekly warfarin regimen and recheck INR in 4 week(s).

## 2017-12-17 ASSESSMENT — ENCOUNTER SYMPTOMS
NAUSEA: 0
ORTHOPNEA: 0
WHEEZING: 0
CONSTIPATION: 0
BACK PAIN: 0
TROUBLE SWALLOWING: 0
ABDOMINAL PAIN: 0
COUGH: 0
PHOTOPHOBIA: 0
BLURRED VISION: 0
ABDOMINAL DISTENTION: 0
SHORTNESS OF BREATH: 0
VOMITING: 0
COLOR CHANGE: 0
DIARRHEA: 0

## 2018-01-09 ENCOUNTER — HOSPITAL ENCOUNTER (OUTPATIENT)
Dept: PHARMACY | Age: 83
Setting detail: THERAPIES SERIES
Discharge: HOME | End: 2018-01-09
Payer: MEDICARE

## 2018-01-09 ENCOUNTER — HOSPITAL ENCOUNTER (OUTPATIENT)
Age: 83
Discharge: HOME | End: 2018-01-09
Payer: MEDICARE

## 2018-01-09 VITALS
WEIGHT: 149.2 LBS | HEART RATE: 60 BPM | DIASTOLIC BLOOD PRESSURE: 68 MMHG | SYSTOLIC BLOOD PRESSURE: 122 MMHG | BODY MASS INDEX: 28.19 KG/M2

## 2018-01-09 DIAGNOSIS — Z86.718 HISTORY OF DVT OF LOWER EXTREMITY: ICD-10-CM

## 2018-01-09 DIAGNOSIS — I10 ESSENTIAL HYPERTENSION: ICD-10-CM

## 2018-01-09 DIAGNOSIS — Z79.01 LONG TERM CURRENT USE OF ANTICOAGULANT THERAPY: ICD-10-CM

## 2018-01-09 DIAGNOSIS — N18.9 CHRONIC RENAL IMPAIRMENT, UNSPECIFIED CKD STAGE: ICD-10-CM

## 2018-01-09 DIAGNOSIS — I35.0 AORTIC STENOSIS, SEVERE: ICD-10-CM

## 2018-01-09 LAB
ANION GAP SERPL CALCULATED.3IONS-SCNC: 11 MMOL/L (ref 9–17)
BUN BLDV-MCNC: 18 MG/DL (ref 8–23)
BUN/CREAT BLD: 18 (ref 9–20)
CALCIUM SERPL-MCNC: 10.1 MG/DL (ref 8.6–10.4)
CHLORIDE BLD-SCNC: 101 MMOL/L (ref 98–107)
CO2: 30 MMOL/L (ref 20–31)
CREAT SERPL-MCNC: 0.99 MG/DL (ref 0.5–0.9)
GFR AFRICAN AMERICAN: >60 ML/MIN
GFR NON-AFRICAN AMERICAN: 53 ML/MIN
GFR SERPL CREATININE-BSD FRML MDRD: ABNORMAL ML/MIN/{1.73_M2}
GFR SERPL CREATININE-BSD FRML MDRD: ABNORMAL ML/MIN/{1.73_M2}
GLUCOSE BLD-MCNC: 107 MG/DL (ref 70–99)
INR BLD: 2.3
POTASSIUM SERPL-SCNC: 4.3 MMOL/L (ref 3.7–5.3)
SODIUM BLD-SCNC: 142 MMOL/L (ref 135–144)

## 2018-01-09 PROCEDURE — 85610 PROTHROMBIN TIME: CPT

## 2018-01-09 PROCEDURE — 36415 COLL VENOUS BLD VENIPUNCTURE: CPT

## 2018-01-09 PROCEDURE — 80048 BASIC METABOLIC PNL TOTAL CA: CPT

## 2018-01-09 PROCEDURE — 99211 OFF/OP EST MAY X REQ PHY/QHP: CPT

## 2018-01-09 NOTE — PROGRESS NOTES
Fingerstick INR drawn per clinic protocol. Patient states no visible blood in urine and no black tarry stool. Denies any missed doses of warfarin. No change in other maintenance medications or in diet. [facility] will have a BMP today for Dr. Katty Jefferson. Pat complains that her fingernails are breaking and she has never had this problem before. Will continue current weekly warfarin regimen and recheck INR in 4 week(s).

## 2018-02-05 ENCOUNTER — TELEPHONE (OUTPATIENT)
Dept: FAMILY MEDICINE CLINIC | Age: 83
End: 2018-02-05

## 2018-02-06 ENCOUNTER — OFFICE VISIT (OUTPATIENT)
Dept: FAMILY MEDICINE CLINIC | Age: 83
End: 2018-02-06
Payer: MEDICARE

## 2018-02-06 ENCOUNTER — HOSPITAL ENCOUNTER (OUTPATIENT)
Age: 83
Discharge: HOME | End: 2018-02-08
Payer: MEDICARE

## 2018-02-06 ENCOUNTER — HOSPITAL ENCOUNTER (OUTPATIENT)
Dept: GENERAL RADIOLOGY | Age: 83
Discharge: HOME | End: 2018-02-08
Payer: MEDICARE

## 2018-02-06 ENCOUNTER — HOSPITAL ENCOUNTER (OUTPATIENT)
Dept: PHARMACY | Age: 83
Setting detail: THERAPIES SERIES
Discharge: HOME | End: 2018-02-06
Payer: MEDICARE

## 2018-02-06 VITALS — WEIGHT: 148 LBS | BODY MASS INDEX: 27.96 KG/M2

## 2018-02-06 VITALS
BODY MASS INDEX: 27.96 KG/M2 | DIASTOLIC BLOOD PRESSURE: 72 MMHG | WEIGHT: 148 LBS | SYSTOLIC BLOOD PRESSURE: 116 MMHG | HEART RATE: 67 BPM | OXYGEN SATURATION: 98 %

## 2018-02-06 DIAGNOSIS — I35.0 AORTIC STENOSIS, SEVERE: ICD-10-CM

## 2018-02-06 DIAGNOSIS — M25.562 ACUTE PAIN OF LEFT KNEE: ICD-10-CM

## 2018-02-06 DIAGNOSIS — Z79.01 LONG TERM CURRENT USE OF ANTICOAGULANT THERAPY: ICD-10-CM

## 2018-02-06 DIAGNOSIS — M25.562 ACUTE PAIN OF LEFT KNEE: Primary | ICD-10-CM

## 2018-02-06 DIAGNOSIS — Z86.718 HISTORY OF DVT OF LOWER EXTREMITY: ICD-10-CM

## 2018-02-06 LAB — INR BLD: 2.3

## 2018-02-06 PROCEDURE — 1123F ACP DISCUSS/DSCN MKR DOCD: CPT | Performed by: FAMILY MEDICINE

## 2018-02-06 PROCEDURE — G8427 DOCREV CUR MEDS BY ELIG CLIN: HCPCS | Performed by: FAMILY MEDICINE

## 2018-02-06 PROCEDURE — 73564 X-RAY EXAM KNEE 4 OR MORE: CPT

## 2018-02-06 PROCEDURE — 99211 OFF/OP EST MAY X REQ PHY/QHP: CPT

## 2018-02-06 PROCEDURE — 1090F PRES/ABSN URINE INCON ASSESS: CPT | Performed by: FAMILY MEDICINE

## 2018-02-06 PROCEDURE — 4040F PNEUMOC VAC/ADMIN/RCVD: CPT | Performed by: FAMILY MEDICINE

## 2018-02-06 PROCEDURE — 1036F TOBACCO NON-USER: CPT | Performed by: FAMILY MEDICINE

## 2018-02-06 PROCEDURE — 99213 OFFICE O/P EST LOW 20 MIN: CPT | Performed by: FAMILY MEDICINE

## 2018-02-06 PROCEDURE — 85610 PROTHROMBIN TIME: CPT

## 2018-02-06 PROCEDURE — G8417 CALC BMI ABV UP PARAM F/U: HCPCS | Performed by: FAMILY MEDICINE

## 2018-02-06 PROCEDURE — G8484 FLU IMMUNIZE NO ADMIN: HCPCS | Performed by: FAMILY MEDICINE

## 2018-02-06 ASSESSMENT — PATIENT HEALTH QUESTIONNAIRE - PHQ9
2. FEELING DOWN, DEPRESSED OR HOPELESS: 0
1. LITTLE INTEREST OR PLEASURE IN DOING THINGS: 0
SUM OF ALL RESPONSES TO PHQ9 QUESTIONS 1 & 2: 0
SUM OF ALL RESPONSES TO PHQ QUESTIONS 1-9: 0

## 2018-02-06 NOTE — PROGRESS NOTES
Patient presents today for left knee injury that happened a few days ago while she was chasing her dogs while they were fighting. Patient has applied ice to the knee and tried to stay off of it as much as she could but still has pain when walking, bruising and swelling as well.

## 2018-02-06 NOTE — PROGRESS NOTES
Fingerstick INR drawn per clinic protocol. Patient states no visible blood in urine and no black tarry stool. Denies any missed doses of warfarin. No change in other maintenance medications or in diet. All medication bottles reviewed for accuracy. Sharifa Everett states she \"twisted\" her left knee and fell about 1 week ago. Sharifa Everett states she has been using ice, biofreeze and 2 APAP as needed (up to 4 tablets daily). She will see Dr. Rocael Espitia after our appointment today. Also Sharifa Everett states she has been getting her \"days mixed up. \" Will continue current weekly warfarin regimen and recheck INR in 4 week(s).

## 2018-02-25 ASSESSMENT — ENCOUNTER SYMPTOMS
SHORTNESS OF BREATH: 0
ABDOMINAL PAIN: 0
DIARRHEA: 0
COLOR CHANGE: 0
TROUBLE SWALLOWING: 0
PHOTOPHOBIA: 0
COUGH: 0
CONSTIPATION: 0
BACK PAIN: 0
ABDOMINAL DISTENTION: 0
NAUSEA: 0
VOMITING: 0
WHEEZING: 0

## 2018-02-25 NOTE — PROGRESS NOTES
and well-nourished. HENT:   Head: Normocephalic and atraumatic. Right Ear: External ear normal.   Left Ear: External ear normal.   Eyes: Conjunctivae and EOM are normal.   Neck: Normal range of motion. Neck supple. No thyromegaly present. Cardiovascular: Normal rate. Exam reveals no gallop and no friction rub. Pulmonary/Chest: Effort normal and breath sounds normal. No respiratory distress. She has no wheezes. She has no rales. She exhibits no tenderness. Abdominal: Soft. Bowel sounds are normal. She exhibits no distension. There is no tenderness. There is no rebound and no guarding. Musculoskeletal: Normal range of motion. She exhibits no edema. Left knee: She exhibits swelling. She exhibits normal range of motion, no deformity, no laceration and no erythema. Tenderness found. Medial joint line tenderness noted. Lymphadenopathy:     She has no cervical adenopathy. Neurological: She is alert and oriented to person, place, and time. She exhibits normal muscle tone. Coordination normal.   Skin: Skin is warm and dry. No rash noted. No erythema. Nursing note and vitals reviewed.       Vitals:  /72   Pulse 67   Wt 148 lb (67.1 kg)   SpO2 98%   BMI 27.96 kg/m²       Data:     Lab Results   Component Value Date     01/09/2018    K 4.3 01/09/2018     01/09/2018    CO2 30 01/09/2018    BUN 18 01/09/2018    CREATININE 0.99 01/09/2018    GLUCOSE 107 01/09/2018    GLUCOSE 96 01/09/2012    PROT 7.5 07/25/2017    LABALBU 4.5 07/25/2017    LABALBU 4.3 01/09/2012    BILITOT 0.76 07/25/2017    ALKPHOS 97 07/25/2017    AST 19 07/25/2017    ALT 7 07/25/2017     Lab Results   Component Value Date    WBC 7.3 07/25/2017    RBC 4.31 07/25/2017    HGB 12.0 07/25/2017    HCT 37.6 07/25/2017    MCV 87.3 07/25/2017    MCH 28.0 07/25/2017    MCHC 32.0 07/25/2017    RDW 18.4 07/25/2017     07/25/2017    MPV NOT REPORTED 07/25/2017     Lab Results   Component Value Date    TSH 0.85 07/25/2017     Lab Results   Component Value Date    CHOL 149 07/25/2017    HDL 80 07/25/2017          Assessment:       1. Acute pain of left knee  XR KNEE LEFT (MIN 4 VIEWS)    CANCELED: XR KNEE LEFT (3 VIEWS)       Plan:   1. Acute pain of left knee-will get x-ray to rule out any fracture. Recommend judicious use of NSAIDs. Recommend ice/heat. We'll follow closely. Quality & Risk Score Accuracy - MEDICARE ADVANTAGE    Last edited 02/25/18 14:58 EST by Suellen Giang DO             Completed Refills   Requested Prescriptions      No prescriptions requested or ordered in this encounter     Return if symptoms worsen or fail to improve. No orders of the defined types were placed in this encounter. Orders Placed This Encounter   Procedures    XR KNEE LEFT (MIN 4 VIEWS)     Standing Status:   Future     Number of Occurrences:   1     Standing Expiration Date:   2/6/2019     Order Specific Question:   Reason for exam:     Answer:   fall on 2-2-18, with persistent anterior knee pain         There are no Patient Instructions on file for this visit. Electronically signed by Suellen Giang DO on 2/25/2018 at 3:01 PM         Completed Refills   Requested Prescriptions      No prescriptions requested or ordered in this encounter         Roberto Hutson received counseling on the following healthy behaviors: nutrition and exercise  Reviewed prior labs and health maintenance. Continue current medications, diet and exercise. Discussed use, benefit, and side effects of prescribed medications. Barriers to medication compliance addressed. Patient given educational materials - see patient instructions. All patient questions answered. Patient voiced understanding.

## 2018-03-06 ENCOUNTER — HOSPITAL ENCOUNTER (OUTPATIENT)
Dept: PHARMACY | Age: 83
Setting detail: THERAPIES SERIES
Discharge: HOME | End: 2018-03-06
Payer: MEDICARE

## 2018-03-06 VITALS
WEIGHT: 148.4 LBS | DIASTOLIC BLOOD PRESSURE: 64 MMHG | HEART RATE: 62 BPM | SYSTOLIC BLOOD PRESSURE: 132 MMHG | BODY MASS INDEX: 28.04 KG/M2

## 2018-03-06 DIAGNOSIS — Z79.01 LONG TERM CURRENT USE OF ANTICOAGULANT THERAPY: ICD-10-CM

## 2018-03-06 DIAGNOSIS — I35.0 AORTIC STENOSIS, SEVERE: ICD-10-CM

## 2018-03-06 DIAGNOSIS — Z86.718 HISTORY OF DVT OF LOWER EXTREMITY: ICD-10-CM

## 2018-03-06 LAB — INR BLD: 2.8

## 2018-03-06 PROCEDURE — 85610 PROTHROMBIN TIME: CPT

## 2018-03-06 PROCEDURE — 99211 OFF/OP EST MAY X REQ PHY/QHP: CPT

## 2018-03-06 NOTE — PROGRESS NOTES
Fingerstick INR drawn per clinic protocol. Patient states no visible blood in urine and no black tarry stool. Denies any missed doses of warfarin. All medications reviewed for accuracy. No change in other maintenance medications or in diet. Will continue current warfarin regimen and recheck INR in 4 weeks.

## 2018-04-03 ENCOUNTER — HOSPITAL ENCOUNTER (OUTPATIENT)
Dept: PHARMACY | Age: 83
Setting detail: THERAPIES SERIES
Discharge: HOME | End: 2018-04-03
Payer: MEDICARE

## 2018-04-03 VITALS
WEIGHT: 149.7 LBS | SYSTOLIC BLOOD PRESSURE: 142 MMHG | DIASTOLIC BLOOD PRESSURE: 69 MMHG | BODY MASS INDEX: 28.29 KG/M2 | HEART RATE: 62 BPM

## 2018-04-03 DIAGNOSIS — Z79.01 LONG TERM CURRENT USE OF ANTICOAGULANT THERAPY: ICD-10-CM

## 2018-04-03 DIAGNOSIS — Z86.718 HISTORY OF DVT OF LOWER EXTREMITY: ICD-10-CM

## 2018-04-03 DIAGNOSIS — I35.0 AORTIC STENOSIS, SEVERE: ICD-10-CM

## 2018-04-03 LAB — INR BLD: 2.3

## 2018-04-03 PROCEDURE — 99211 OFF/OP EST MAY X REQ PHY/QHP: CPT

## 2018-04-03 PROCEDURE — 85610 PROTHROMBIN TIME: CPT

## 2018-05-01 ENCOUNTER — HOSPITAL ENCOUNTER (OUTPATIENT)
Dept: PHARMACY | Age: 83
Setting detail: THERAPIES SERIES
Discharge: HOME | End: 2018-05-01
Payer: MEDICARE

## 2018-05-01 VITALS
SYSTOLIC BLOOD PRESSURE: 132 MMHG | WEIGHT: 151.1 LBS | HEART RATE: 64 BPM | DIASTOLIC BLOOD PRESSURE: 86 MMHG | BODY MASS INDEX: 28.55 KG/M2

## 2018-05-01 DIAGNOSIS — I35.0 AORTIC STENOSIS, SEVERE: ICD-10-CM

## 2018-05-01 DIAGNOSIS — Z79.01 LONG TERM CURRENT USE OF ANTICOAGULANT THERAPY: ICD-10-CM

## 2018-05-01 DIAGNOSIS — Z86.718 HISTORY OF DVT OF LOWER EXTREMITY: ICD-10-CM

## 2018-05-01 LAB — INR BLD: 2.5

## 2018-05-01 PROCEDURE — 99211 OFF/OP EST MAY X REQ PHY/QHP: CPT

## 2018-05-01 PROCEDURE — 85610 PROTHROMBIN TIME: CPT

## 2018-05-01 RX ORDER — MV-MN/LUTEIN/ZEAX/BILBER/HB277 5-1-7.5 MG
1 CAPSULE ORAL DAILY
COMMUNITY
End: 2018-07-10

## 2018-06-05 ENCOUNTER — HOSPITAL ENCOUNTER (OUTPATIENT)
Dept: PHARMACY | Age: 83
Setting detail: THERAPIES SERIES
Discharge: HOME | End: 2018-06-05
Payer: MEDICARE

## 2018-06-05 VITALS
BODY MASS INDEX: 27.98 KG/M2 | HEART RATE: 64 BPM | SYSTOLIC BLOOD PRESSURE: 134 MMHG | WEIGHT: 148.1 LBS | DIASTOLIC BLOOD PRESSURE: 70 MMHG

## 2018-06-05 DIAGNOSIS — Z79.01 LONG TERM CURRENT USE OF ANTICOAGULANT THERAPY: ICD-10-CM

## 2018-06-05 DIAGNOSIS — Z86.718 HISTORY OF DVT OF LOWER EXTREMITY: ICD-10-CM

## 2018-06-05 DIAGNOSIS — I35.0 AORTIC STENOSIS, SEVERE: ICD-10-CM

## 2018-06-05 LAB — INR BLD: 2.2

## 2018-06-05 PROCEDURE — 85610 PROTHROMBIN TIME: CPT

## 2018-06-05 PROCEDURE — 99211 OFF/OP EST MAY X REQ PHY/QHP: CPT

## 2018-06-12 RX ORDER — DILTIAZEM HYDROCHLORIDE 120 MG/1
CAPSULE, EXTENDED RELEASE ORAL
Qty: 30 CAPSULE | Refills: 3 | Status: SHIPPED | OUTPATIENT
Start: 2018-06-12 | End: 2018-07-30 | Stop reason: SDUPTHER

## 2018-07-10 ENCOUNTER — HOSPITAL ENCOUNTER (OUTPATIENT)
Dept: PHARMACY | Age: 83
Setting detail: THERAPIES SERIES
Discharge: HOME | End: 2018-07-10
Payer: MEDICARE

## 2018-07-10 ENCOUNTER — HOSPITAL ENCOUNTER (OUTPATIENT)
Age: 83
Discharge: HOME | End: 2018-07-10
Payer: MEDICARE

## 2018-07-10 ENCOUNTER — HOSPITAL ENCOUNTER (OUTPATIENT)
Dept: GENERAL RADIOLOGY | Age: 83
Discharge: HOME | End: 2018-07-12
Payer: MEDICARE

## 2018-07-10 ENCOUNTER — HOSPITAL ENCOUNTER (OUTPATIENT)
Age: 83
Discharge: HOME | End: 2018-07-12
Payer: MEDICARE

## 2018-07-10 VITALS
BODY MASS INDEX: 27.74 KG/M2 | DIASTOLIC BLOOD PRESSURE: 76 MMHG | SYSTOLIC BLOOD PRESSURE: 132 MMHG | HEART RATE: 60 BPM | WEIGHT: 146.8 LBS

## 2018-07-10 DIAGNOSIS — Z95.2 H/O AORTIC VALVE REPLACEMENT: ICD-10-CM

## 2018-07-10 DIAGNOSIS — N18.9 CHRONIC RENAL INSUFFICIENCY, UNSPECIFIED STAGE: ICD-10-CM

## 2018-07-10 DIAGNOSIS — E03.9 HYPOTHYROIDISM, UNSPECIFIED TYPE: ICD-10-CM

## 2018-07-10 DIAGNOSIS — I35.0 AORTIC STENOSIS, SEVERE: ICD-10-CM

## 2018-07-10 DIAGNOSIS — Z95.1 HX OF CABG: ICD-10-CM

## 2018-07-10 DIAGNOSIS — I10 ESSENTIAL HYPERTENSION, BENIGN: ICD-10-CM

## 2018-07-10 DIAGNOSIS — Z79.01 LONG TERM CURRENT USE OF ANTICOAGULANT THERAPY: ICD-10-CM

## 2018-07-10 DIAGNOSIS — E55.9 VITAMIN D DEFICIENCY DISEASE: ICD-10-CM

## 2018-07-10 DIAGNOSIS — Z86.79 PERSONAL HISTORY OF ATRIAL FLUTTER: ICD-10-CM

## 2018-07-10 DIAGNOSIS — Z86.718 HISTORY OF DVT OF LOWER EXTREMITY: ICD-10-CM

## 2018-07-10 DIAGNOSIS — I10 ESSENTIAL HYPERTENSION: ICD-10-CM

## 2018-07-10 LAB
ABSOLUTE EOS #: 0.2 K/UL (ref 0–0.4)
ABSOLUTE IMMATURE GRANULOCYTE: ABNORMAL K/UL (ref 0–0.3)
ABSOLUTE LYMPH #: 1.8 K/UL (ref 1–4.8)
ABSOLUTE MONO #: 0.5 K/UL (ref 0–1)
ALBUMIN SERPL-MCNC: 4.2 G/DL (ref 3.5–5.2)
ALBUMIN/GLOBULIN RATIO: ABNORMAL (ref 1–2.5)
ALP BLD-CCNC: 74 U/L (ref 35–104)
ALT SERPL-CCNC: 8 U/L (ref 5–33)
ANION GAP SERPL CALCULATED.3IONS-SCNC: 13 MMOL/L (ref 9–17)
AST SERPL-CCNC: 20 U/L
BASOPHILS # BLD: 1 % (ref 0–2)
BASOPHILS ABSOLUTE: 0 K/UL (ref 0–0.2)
BILIRUB SERPL-MCNC: 0.57 MG/DL (ref 0.3–1.2)
BUN BLDV-MCNC: 20 MG/DL (ref 8–23)
BUN/CREAT BLD: 17 (ref 9–20)
CALCIUM SERPL-MCNC: 10 MG/DL (ref 8.6–10.4)
CHLORIDE BLD-SCNC: 102 MMOL/L (ref 98–107)
CHOLESTEROL/HDL RATIO: 2
CHOLESTEROL: 158 MG/DL
CO2: 27 MMOL/L (ref 20–31)
CREAT SERPL-MCNC: 1.2 MG/DL (ref 0.5–0.9)
DIFFERENTIAL TYPE: YES
EKG ATRIAL RATE: 268 BPM
EKG P AXIS: 42 DEGREES
EKG Q-T INTERVAL: 454 MS
EKG QRS DURATION: 138 MS
EKG QTC CALCULATION (BAZETT): 479 MS
EKG R AXIS: 58 DEGREES
EKG T AXIS: 24 DEGREES
EKG VENTRICULAR RATE: 67 BPM
EOSINOPHILS RELATIVE PERCENT: 3 % (ref 0–5)
GFR AFRICAN AMERICAN: 52 ML/MIN
GFR NON-AFRICAN AMERICAN: 42 ML/MIN
GFR SERPL CREATININE-BSD FRML MDRD: ABNORMAL ML/MIN/{1.73_M2}
GFR SERPL CREATININE-BSD FRML MDRD: ABNORMAL ML/MIN/{1.73_M2}
GLUCOSE BLD-MCNC: 95 MG/DL (ref 70–99)
HCT VFR BLD CALC: 38.4 % (ref 36–46)
HDLC SERPL-MCNC: 81 MG/DL
HEMOGLOBIN: 12.7 G/DL (ref 12–16)
IMMATURE GRANULOCYTES: ABNORMAL %
INR BLD: 3
LDL CHOLESTEROL: 60 MG/DL (ref 0–130)
LYMPHOCYTES # BLD: 30 % (ref 15–40)
MAGNESIUM: 1.4 MG/DL (ref 1.6–2.6)
MCH RBC QN AUTO: 29.4 PG (ref 26–34)
MCHC RBC AUTO-ENTMCNC: 33.1 G/DL (ref 31–37)
MCV RBC AUTO: 88.9 FL (ref 80–100)
MONOCYTES # BLD: 9 % (ref 4–8)
NRBC AUTOMATED: ABNORMAL PER 100 WBC
PATIENT FASTING?: YES
PDW BLD-RTO: 17.2 % (ref 12.1–15.2)
PLATELET # BLD: 166 K/UL (ref 140–450)
PLATELET ESTIMATE: ABNORMAL
PMV BLD AUTO: ABNORMAL FL (ref 6–12)
POTASSIUM SERPL-SCNC: 4 MMOL/L (ref 3.7–5.3)
RBC # BLD: 4.32 M/UL (ref 4–5.2)
RBC # BLD: ABNORMAL 10*6/UL
SEG NEUTROPHILS: 57 % (ref 47–75)
SEGMENTED NEUTROPHILS ABSOLUTE COUNT: 3.4 K/UL (ref 2.5–7)
SODIUM BLD-SCNC: 142 MMOL/L (ref 135–144)
TOTAL PROTEIN: 7.3 G/DL (ref 6.4–8.3)
TRIGL SERPL-MCNC: 86 MG/DL
TSH SERPL DL<=0.05 MIU/L-ACNC: 3.31 MIU/L (ref 0.3–5)
VITAMIN D 25-HYDROXY: 48.6 NG/ML (ref 30–100)
VLDLC SERPL CALC-MCNC: NORMAL MG/DL (ref 1–30)
WBC # BLD: 5.9 K/UL (ref 3.5–11)
WBC # BLD: ABNORMAL 10*3/UL

## 2018-07-10 PROCEDURE — 99211 OFF/OP EST MAY X REQ PHY/QHP: CPT

## 2018-07-10 PROCEDURE — 93005 ELECTROCARDIOGRAM TRACING: CPT

## 2018-07-10 PROCEDURE — 80053 COMPREHEN METABOLIC PANEL: CPT

## 2018-07-10 PROCEDURE — 36415 COLL VENOUS BLD VENIPUNCTURE: CPT

## 2018-07-10 PROCEDURE — 80061 LIPID PANEL: CPT

## 2018-07-10 PROCEDURE — 82306 VITAMIN D 25 HYDROXY: CPT

## 2018-07-10 PROCEDURE — 84443 ASSAY THYROID STIM HORMONE: CPT

## 2018-07-10 PROCEDURE — 83735 ASSAY OF MAGNESIUM: CPT

## 2018-07-10 PROCEDURE — 85025 COMPLETE CBC W/AUTO DIFF WBC: CPT

## 2018-07-10 PROCEDURE — 85610 PROTHROMBIN TIME: CPT

## 2018-07-10 PROCEDURE — 71046 X-RAY EXAM CHEST 2 VIEWS: CPT

## 2018-07-16 ENCOUNTER — HOSPITAL ENCOUNTER (OUTPATIENT)
Dept: NON INVASIVE DIAGNOSTICS | Age: 83
Discharge: HOME | End: 2018-07-16
Payer: MEDICARE

## 2018-07-16 DIAGNOSIS — Z95.2 H/O AORTIC VALVE REPLACEMENT: ICD-10-CM

## 2018-07-16 LAB
LV EF: 53 %
LVEF MODALITY: NORMAL

## 2018-07-16 PROCEDURE — 93306 TTE W/DOPPLER COMPLETE: CPT

## 2018-07-23 ENCOUNTER — OFFICE VISIT (OUTPATIENT)
Dept: CARDIOLOGY CLINIC | Age: 83
End: 2018-07-23
Payer: MEDICARE

## 2018-07-23 VITALS
HEART RATE: 75 BPM | OXYGEN SATURATION: 96 % | DIASTOLIC BLOOD PRESSURE: 70 MMHG | WEIGHT: 146 LBS | SYSTOLIC BLOOD PRESSURE: 150 MMHG | BODY MASS INDEX: 27.59 KG/M2

## 2018-07-23 DIAGNOSIS — E55.9 VITAMIN D DEFICIENCY DISEASE: ICD-10-CM

## 2018-07-23 DIAGNOSIS — I35.0 AORTIC STENOSIS, SEVERE: ICD-10-CM

## 2018-07-23 DIAGNOSIS — I10 ESSENTIAL HYPERTENSION: ICD-10-CM

## 2018-07-23 DIAGNOSIS — I48.3 TYPICAL ATRIAL FLUTTER (HCC): ICD-10-CM

## 2018-07-23 DIAGNOSIS — Z95.2 H/O AORTIC VALVE REPLACEMENT: ICD-10-CM

## 2018-07-23 DIAGNOSIS — I10 ESSENTIAL HYPERTENSION, BENIGN: Primary | ICD-10-CM

## 2018-07-23 PROCEDURE — G8417 CALC BMI ABV UP PARAM F/U: HCPCS | Performed by: INTERNAL MEDICINE

## 2018-07-23 PROCEDURE — 1123F ACP DISCUSS/DSCN MKR DOCD: CPT | Performed by: INTERNAL MEDICINE

## 2018-07-23 PROCEDURE — 1036F TOBACCO NON-USER: CPT | Performed by: INTERNAL MEDICINE

## 2018-07-23 PROCEDURE — 1090F PRES/ABSN URINE INCON ASSESS: CPT | Performed by: INTERNAL MEDICINE

## 2018-07-23 PROCEDURE — G8427 DOCREV CUR MEDS BY ELIG CLIN: HCPCS | Performed by: INTERNAL MEDICINE

## 2018-07-23 PROCEDURE — 1101F PT FALLS ASSESS-DOCD LE1/YR: CPT | Performed by: INTERNAL MEDICINE

## 2018-07-23 PROCEDURE — 99214 OFFICE O/P EST MOD 30 MIN: CPT | Performed by: INTERNAL MEDICINE

## 2018-07-23 PROCEDURE — 4040F PNEUMOC VAC/ADMIN/RCVD: CPT | Performed by: INTERNAL MEDICINE

## 2018-07-23 RX ORDER — CALCIUM CARBONATE 300MG(750)
400 TABLET,CHEWABLE ORAL DAILY
COMMUNITY
End: 2018-07-23 | Stop reason: SDUPTHER

## 2018-07-23 RX ORDER — CALCIUM CARBONATE 300MG(750)
400 TABLET,CHEWABLE ORAL DAILY
Qty: 30 TABLET | Refills: 11 | Status: SHIPPED | OUTPATIENT
Start: 2018-07-23 | End: 2018-09-04 | Stop reason: SINTOL

## 2018-07-23 NOTE — PATIENT INSTRUCTIONS
Will ADD Mg 400mg one daily   Will follow up in one week for BP   Check and to check left knee from fall   On 07/23/18  Will follow up in one year

## 2018-07-23 NOTE — LETTER
She did fall when she was walking into our office. She hit her left shin and does have a hematoma on her shin. She is able to walk without difficulty and has no pain on that shin. We did wrap it in an Ace wrap and did ice it. She has had no syncope or near syncope. Denies any fevers, sweats or chills. She has had no hospitalizations or procedures. She does miss seeing Keyla Haque, although she does keep current by going to the closest truck stop and listening to the [employer] talk about Keyla Sniff. .. CARDIAC RISK FACTORS:  Known CAD:  Positive. Hypertension:  Positive. Other Family Members:  Positive. Peripheral Vascular Disease:  Negative. Hyperlipidemia:  Positive. Smoking:  Negative. Diabetes:  Negative. MEDICATIONS AT HOME:  She is currently on Cartia  mg daily, Coumadin as directed, Zyloprim 100 mg b.i.d., Synthroid 75 mcg daily, Cozaar 50 mg half a tablet b.i.d., Lopressor 25 mg b.i.d., Lipitor 10 mg nightly, vitamin D 2000 units daily, aspirin 81 mg daily. PAST MEDICAL HISTORY:  1. Emphysema. 2.  Mild pulmonary hypertension with a PA pressure of 50.  3.  Partial hysterectomy. 4.  Carpal tunnel surgery. 5.  Euthyroid, on replacement. 6.  Hypertension, very labile, usually elevated in our office. 7.  Chronic atrial fibrillation, on Coumadin. 8.  Aortic valve replacement with a #23 [address] Nasir pericardial Trifecta bioprosthetic aortic valve. FAMILY HISTORY:  Mother had CAD. SOCIAL HISTORY:  She is 80years old. Lived with Norman Dickerson for 34 years. Has 6 children with one , 5 in this area. She still misses \"Roger Roger\" but does value the years they had together. .. REVIEW OF SYSTEMS:  Cardiac as above.   Other systems reviewed including constitutional, eyes, ears, nose and throat, cardiovascular, respiratory, GI, , musculoskeletal, integumentary, neurologic, psychiatric, endocrine, hematologic, and allergic/immunologic and were all negative EKG showed atrial fibrillation with a controlled ventricular response and a right bundle-branch block, which is unchanged from her previous EKGs. Echocardiogram showed normal functioning bioprosthetic aortic valve replacement with mild-to-moderate aortic insufficiency, which is unchanged. She had moderate mitral regurgitation. She had an EF of 50% to 55% with a PA pressure of approximately 50 mmHg. IMPRESSION:  1. History of severe aortic stenosis with aortic valve area of 0.42 to 0.6 cm2 with a catheterization on 06/04/2014 that showed 60% RCA with unremarkable LAD and circumflex. EF of 60%. 2.  Acute renal insufficiency, which resolved and she was taken off Indocin for her gout. 3.  Open heart surgery by Dr. Jorgito Brenner on 07/29/2014 with a vein graft to the right coronary artery and a #23 [address] pericardial Trifecta bioprosthesis in the aortic position for severe aortic stenosis. 4.  Mild-to-moderate aortic insufficiency with a normal EF of 60% with moderate-to-severe pulmonary hypertension with PA pressure of 50 mmHg, which is unchanged from her previous echocardiogram, which will be done yearly. 5.  Chronic atrial fibrillation, on Coumadin. 6.  Euthyroid, on treatment. 7.  Hypertension, very labile and elevated in our office today, although she had fallen and bruised her left shin. 8.  Hyperlipidemia, well controlled. 9.  Chronic renal insufficiency with a creatinine in the 1.2 to 1.3 range, which is her baseline with a GFR in the 40 to 45 range. 10.  COPD. 11.  Moderate-to-severe pulmonary hypertension with a PA pressure of 50.  12. Hypomagnesemia. PLAN:  1. Start magnesium 400 mg daily. 2.  We will do a blood pressure check and look at her left shin where she fell in one week. 3.  We will see her in one year for an echocardiogram with an appointment 1 week later to monitor her bioprosthetic aortic valve. DISCUSSION:  Mrs. Damaris Rizvi overall is doing well.   She has had some falls but denies any syncope or near syncope, lightheadedness or dizziness. It sounds more like she is losing her balance. She did fall coming into our office today and stated that she tripped. She did bruise her left shin and there is a hematoma developing. We did wrap it in an Ace wrap bandage and did put ice on it; she will take both home with her. I will take a look at her leg again in one week. Her blood pressure was fairly elevated in our office, but again, she had fallen on her way in. We will recheck her pressure in one week. If her pressure is elevated, then I would consider increasing her losartan to 50 mg twice a day from half a tablet twice a day. Her prosthetic aortic valve is functioning well. There is no significant prosthetic valve stenosis. She does have mild-to-moderate aortic insufficiency, which has been stable. Her EF has been normal.    We will treat her magnesium with 400 mg daily and I will see her in one year in followup. Thank you very much for allowing me the privilege of seeing Mrs. Luigi Mccord. Any questions on my thoughts, please do not hesitate to contact me.     Sincerely,        Bib Jarrell    D: 07/23/2018 9:32:40     T: 07/23/2018 23:33:58     [email or web address]  Job#: [id number]   Doc#: [id number]

## 2018-07-24 NOTE — PROGRESS NOTES
Ov Dr. Beal Book for one year follow up   Pt stated she fell in parking lot when   Coming into office-tripped getting out  Of car- pt states been falling \"quit a bit\"  Been having a lot of dizzy spells but  That is not what happened this am   \"just tripped\" \"was my own fault\"  Now Left knee is swelling up and pain  Will get dizzy and then fall   No hospitalizations procedures   doing ok    Will ADD Mg 400mg one daily   Will follow up in one week for BP   Check and to check left knee from fall   On 07/23/18  Will follow up in one year
unchanged from her previous EKGs. Echocardiogram showed normal functioning bioprosthetic aortic valve replacement with mild-to-moderate aortic insufficiency, which is unchanged. She had moderate mitral regurgitation. She had an EF of 50% to 55% with a PA pressure of approximately 50 mmHg. IMPRESSION:  1. History of severe aortic stenosis with aortic valve area of 0.42 to 0.6 cm2 with a catheterization on 06/04/2014 that showed 60% RCA with unremarkable LAD and circumflex. EF of 60%. 2.  Acute renal insufficiency, which resolved and she was taken off Indocin for her gout. 3.  Open heart surgery by Dr. Sohail Dean on 07/29/2014 with a vein graft to the right coronary artery and a #23 [address] Nasir pericardial Trifecta bioprosthesis in the aortic position for severe aortic stenosis. 4.  Mild-to-moderate aortic insufficiency with a normal EF of 60% with moderate-to-severe pulmonary hypertension with PA pressure of 50 mmHg, which is unchanged from her previous echocardiogram, which will be done yearly. 5.  Chronic atrial fibrillation, on Coumadin. 6.  Euthyroid, on treatment. 7.  Hypertension, very labile and elevated in our office today, although she had fallen and bruised her left shin. 8.  Hyperlipidemia, well controlled. 9.  Chronic renal insufficiency with a creatinine in the 1.2 to 1.3 range, which is her baseline with a GFR in the 40 to 45 range. 10.  COPD. 11.  Moderate-to-severe pulmonary hypertension with a PA pressure of 50.  12. Hypomagnesemia. PLAN:  1. Start magnesium 400 mg daily. 2.  We will do a blood pressure check and look at her left shin where she fell in one week. 3.  We will see her in one year for an echocardiogram with an appointment 1 week later to monitor her bioprosthetic aortic valve. DISCUSSION:  Mrs. Jin Valentin overall is doing well. She has had some falls but denies any syncope or near syncope, lightheadedness or dizziness.   It sounds more like she is losing her

## 2018-07-30 ENCOUNTER — OFFICE VISIT (OUTPATIENT)
Dept: CARDIOLOGY CLINIC | Age: 83
End: 2018-07-30
Payer: MEDICARE

## 2018-07-30 VITALS
OXYGEN SATURATION: 97 % | HEART RATE: 60 BPM | SYSTOLIC BLOOD PRESSURE: 130 MMHG | BODY MASS INDEX: 28.15 KG/M2 | WEIGHT: 149 LBS | DIASTOLIC BLOOD PRESSURE: 80 MMHG

## 2018-07-30 DIAGNOSIS — I10 HYPERTENSION, UNSPECIFIED TYPE: Primary | ICD-10-CM

## 2018-07-30 PROCEDURE — 4040F PNEUMOC VAC/ADMIN/RCVD: CPT | Performed by: INTERNAL MEDICINE

## 2018-07-30 PROCEDURE — 99212 OFFICE O/P EST SF 10 MIN: CPT | Performed by: INTERNAL MEDICINE

## 2018-07-30 PROCEDURE — 1090F PRES/ABSN URINE INCON ASSESS: CPT | Performed by: INTERNAL MEDICINE

## 2018-07-30 PROCEDURE — G8427 DOCREV CUR MEDS BY ELIG CLIN: HCPCS | Performed by: INTERNAL MEDICINE

## 2018-07-30 PROCEDURE — 1123F ACP DISCUSS/DSCN MKR DOCD: CPT | Performed by: INTERNAL MEDICINE

## 2018-07-30 PROCEDURE — 1036F TOBACCO NON-USER: CPT | Performed by: INTERNAL MEDICINE

## 2018-07-30 PROCEDURE — 1101F PT FALLS ASSESS-DOCD LE1/YR: CPT | Performed by: INTERNAL MEDICINE

## 2018-07-30 PROCEDURE — G8417 CALC BMI ABV UP PARAM F/U: HCPCS | Performed by: INTERNAL MEDICINE

## 2018-07-30 RX ORDER — DILTIAZEM HYDROCHLORIDE 120 MG/1
CAPSULE, COATED, EXTENDED RELEASE ORAL
Qty: 90 CAPSULE | Refills: 3 | Status: SHIPPED | OUTPATIENT
Start: 2018-07-30 | End: 2019-06-14 | Stop reason: SDUPTHER

## 2018-07-30 RX ORDER — WARFARIN SODIUM 5 MG/1
TABLET ORAL
Qty: 30 TABLET | Refills: 11 | Status: SHIPPED | OUTPATIENT
Start: 2018-07-30 | End: 2019-02-07 | Stop reason: SDUPTHER

## 2018-07-30 RX ORDER — LOSARTAN POTASSIUM 50 MG/1
25 TABLET ORAL 2 TIMES DAILY
Qty: 180 TABLET | Refills: 3 | Status: SHIPPED | OUTPATIENT
Start: 2018-07-30 | End: 2018-08-29 | Stop reason: SDUPTHER

## 2018-07-30 RX ORDER — ATORVASTATIN CALCIUM 10 MG/1
10 TABLET, FILM COATED ORAL NIGHTLY
Qty: 90 TABLET | Refills: 3 | Status: SHIPPED | OUTPATIENT
Start: 2018-07-30 | End: 2019-06-18 | Stop reason: SDUPTHER

## 2018-08-06 ENCOUNTER — OFFICE VISIT (OUTPATIENT)
Dept: FAMILY MEDICINE CLINIC | Age: 83
End: 2018-08-06
Payer: MEDICARE

## 2018-08-06 VITALS
HEIGHT: 62 IN | OXYGEN SATURATION: 94 % | HEART RATE: 72 BPM | BODY MASS INDEX: 27.23 KG/M2 | DIASTOLIC BLOOD PRESSURE: 60 MMHG | WEIGHT: 148 LBS | SYSTOLIC BLOOD PRESSURE: 128 MMHG

## 2018-08-06 DIAGNOSIS — M79.605 LEG PAIN, DIFFUSE, LEFT: ICD-10-CM

## 2018-08-06 DIAGNOSIS — E03.9 ACQUIRED HYPOTHYROIDISM: ICD-10-CM

## 2018-08-06 DIAGNOSIS — I10 ESSENTIAL HYPERTENSION, BENIGN: Primary | ICD-10-CM

## 2018-08-06 DIAGNOSIS — I48.20 CHRONIC ATRIAL FIBRILLATION (HCC): ICD-10-CM

## 2018-08-06 PROBLEM — I48.92 ATRIAL FLUTTER (HCC): Status: RESOLVED | Noted: 2017-09-08 | Resolved: 2018-08-06

## 2018-08-06 PROBLEM — Z95.2 H/O AORTIC VALVE REPLACEMENT: Status: RESOLVED | Noted: 2017-07-31 | Resolved: 2018-08-06

## 2018-08-06 PROCEDURE — 4040F PNEUMOC VAC/ADMIN/RCVD: CPT | Performed by: FAMILY MEDICINE

## 2018-08-06 PROCEDURE — 1123F ACP DISCUSS/DSCN MKR DOCD: CPT | Performed by: FAMILY MEDICINE

## 2018-08-06 PROCEDURE — 1090F PRES/ABSN URINE INCON ASSESS: CPT | Performed by: FAMILY MEDICINE

## 2018-08-06 PROCEDURE — 1036F TOBACCO NON-USER: CPT | Performed by: FAMILY MEDICINE

## 2018-08-06 PROCEDURE — G8417 CALC BMI ABV UP PARAM F/U: HCPCS | Performed by: FAMILY MEDICINE

## 2018-08-06 PROCEDURE — 1101F PT FALLS ASSESS-DOCD LE1/YR: CPT | Performed by: FAMILY MEDICINE

## 2018-08-06 PROCEDURE — G8427 DOCREV CUR MEDS BY ELIG CLIN: HCPCS | Performed by: FAMILY MEDICINE

## 2018-08-06 PROCEDURE — 99214 OFFICE O/P EST MOD 30 MIN: CPT | Performed by: FAMILY MEDICINE

## 2018-08-06 ASSESSMENT — ENCOUNTER SYMPTOMS
CONSTIPATION: 0
COUGH: 0
EYE REDNESS: 0
ABDOMINAL PAIN: 0
VOMITING: 0
DIARRHEA: 0
NAUSEA: 0
FACIAL SWELLING: 0
SHORTNESS OF BREATH: 0
BLOOD IN STOOL: 0
EYE DISCHARGE: 0

## 2018-08-06 NOTE — PROGRESS NOTES
decrease due to her on coumadin    4. Acquired hypothyroidism  Stable on synthroid    5. Chronic atrial fibrillation (HCC)  Stable on diltiazem, coumadin        Tariq Longoria received counseling on the following healthy behaviors: nutrition and exercise  Reviewed prior labs and health maintenance  Continue current medications, diet and exercise.----pt walks the reservoir daily  Discussed use, benefit, and side effects of prescribed medications. Barriers to medication compliance addressed. Patient given educational materials - see patient instructions  Was a self-tracking handout given in paper form or via [employer]? Yes    Requested Prescriptions      No prescriptions requested or ordered in this encounter       All patient questions answered. Patient voiced understanding. Quality Measures    Body mass index is 27.07 kg/m². Elevated. Weight control planned discussed Healthy diet and regular exercise. BP: 128/60. Blood pressure is normal. Treatment plan consists of No treatment change needed. Fall Risk 8/6/2018 7/16/2014   2 or more falls in past year? yes no   Fall with injury in past year? no no     The patient does not have a history of falls. I did , complete a risk assessment for falls. A plan of care for falls patient declines any further evaluation/treatment for increased falls risk    Lab Results   Component Value Date    LDLCHOLESTEROL 60 07/10/2018    (goal LDL reduction with dx if diabetes is 50% LDL reduction)    PHQ Scores 2/6/2018 2/27/2014   PHQ2 Score 0 0   PHQ9 Score 0 0     Interpretation of Total Score Depression Severity: 1-4 = Minimal depression, 5-9 = Mild depression, 10-14 = Moderate depression, 15-19 = Moderately severe depression, 20-27 = Severe depression        Return in about 6 months (around 2/6/2019) for HTN, Hypothyroid, afib.       Electronically signed by Deborah Steele MD on 8/6/2018 at 9:23 AM

## 2018-08-07 ENCOUNTER — HOSPITAL ENCOUNTER (OUTPATIENT)
Dept: PHARMACY | Age: 83
Setting detail: THERAPIES SERIES
Discharge: HOME | End: 2018-08-07
Payer: MEDICARE

## 2018-08-07 VITALS
HEART RATE: 64 BPM | BODY MASS INDEX: 27.14 KG/M2 | SYSTOLIC BLOOD PRESSURE: 122 MMHG | DIASTOLIC BLOOD PRESSURE: 80 MMHG | WEIGHT: 148.4 LBS

## 2018-08-07 DIAGNOSIS — Z86.718 HISTORY OF DVT OF LOWER EXTREMITY: ICD-10-CM

## 2018-08-07 DIAGNOSIS — I35.0 AORTIC STENOSIS, SEVERE: ICD-10-CM

## 2018-08-07 DIAGNOSIS — Z79.01 LONG TERM CURRENT USE OF ANTICOAGULANT THERAPY: ICD-10-CM

## 2018-08-07 LAB — INR BLD: 2

## 2018-08-07 PROCEDURE — 85610 PROTHROMBIN TIME: CPT

## 2018-08-07 PROCEDURE — 99213 OFFICE O/P EST LOW 20 MIN: CPT

## 2018-08-07 NOTE — PATIENT INSTRUCTIONS
Continue current dose of warfarin as instructed on dosing calendar provided. Continue to monitor urine and stool for signs and symptoms of bleeding. Please notify the clinic of any medication changes. Please remember to bring all medications (both prescription and OTC) to your next visit. Kindly notify the clinic if you are unable to make to your next appointment. Improved

## 2018-08-07 NOTE — PROGRESS NOTES
Fingerstick INR drawn per clinic protocol. Patient states no visible blood in urine and no black tarry stool. Denies any missed doses of warfarin. No change in diet. All medication bottles reviewed for accuracy. Katherin saw Dr. Lian Mary on 7/23/18. She states she fell in the parking lot before seeing Dr. Lian Mary. She still has 2 large bruises on her left leg, but she states it looks \"much better. \" Jimena Martin was started on magnesium 400 mg daily. She has the bottle she purchased at [employer], but we discussed the serving size is 2 tablets=400 mg once daily, but Jimena Martin states she has only been taking 1 tablet. We discussed the importance of taking the right dosage and she will start tomorrow. Katherin saw Dr. Jen Villagran on 8/6/18. Since Katherin's INR is therapeutic today, we will continue current weekly warfarin regimen and recheck INR in 4 week(s). Patient acknowledges working in consult agreement with pharmacist as referred by his/her physician.

## 2018-08-13 ENCOUNTER — TELEPHONE (OUTPATIENT)
Dept: CARDIOLOGY CLINIC | Age: 83
End: 2018-08-13

## 2018-08-29 RX ORDER — LOSARTAN POTASSIUM 50 MG/1
25 TABLET ORAL 2 TIMES DAILY
Qty: 180 TABLET | Refills: 3 | Status: SHIPPED | OUTPATIENT
Start: 2018-08-29 | End: 2019-09-13 | Stop reason: SDUPTHER

## 2018-09-04 ENCOUNTER — HOSPITAL ENCOUNTER (OUTPATIENT)
Dept: PHARMACY | Age: 83
Setting detail: THERAPIES SERIES
Discharge: HOME | End: 2018-09-04
Payer: MEDICARE

## 2018-09-04 VITALS
WEIGHT: 145.4 LBS | DIASTOLIC BLOOD PRESSURE: 69 MMHG | SYSTOLIC BLOOD PRESSURE: 139 MMHG | BODY MASS INDEX: 26.59 KG/M2 | HEART RATE: 72 BPM

## 2018-09-04 DIAGNOSIS — I35.0 AORTIC STENOSIS, SEVERE: ICD-10-CM

## 2018-09-04 DIAGNOSIS — Z79.01 LONG TERM CURRENT USE OF ANTICOAGULANT THERAPY: ICD-10-CM

## 2018-09-04 DIAGNOSIS — Z86.718 HISTORY OF DVT OF LOWER EXTREMITY: ICD-10-CM

## 2018-09-04 LAB — INR BLD: 2.6

## 2018-09-04 PROCEDURE — 99211 OFF/OP EST MAY X REQ PHY/QHP: CPT

## 2018-09-04 PROCEDURE — 99212 OFFICE O/P EST SF 10 MIN: CPT

## 2018-09-04 PROCEDURE — 85610 PROTHROMBIN TIME: CPT

## 2018-09-04 RX ORDER — WARFARIN SODIUM 2 MG/1
TABLET ORAL
Qty: 180 TABLET | Refills: 3 | OUTPATIENT
Start: 2018-09-04 | End: 2019-02-18 | Stop reason: SDUPTHER

## 2018-10-02 ENCOUNTER — HOSPITAL ENCOUNTER (OUTPATIENT)
Dept: PHARMACY | Age: 83
Setting detail: THERAPIES SERIES
Discharge: HOME | End: 2018-10-02
Payer: MEDICARE

## 2018-10-02 VITALS
SYSTOLIC BLOOD PRESSURE: 132 MMHG | DIASTOLIC BLOOD PRESSURE: 66 MMHG | HEART RATE: 68 BPM | BODY MASS INDEX: 26.45 KG/M2 | WEIGHT: 144.6 LBS

## 2018-10-02 DIAGNOSIS — Z86.718 HISTORY OF DVT OF LOWER EXTREMITY: ICD-10-CM

## 2018-10-02 DIAGNOSIS — I35.0 AORTIC STENOSIS, SEVERE: ICD-10-CM

## 2018-10-02 DIAGNOSIS — Z79.01 LONG TERM CURRENT USE OF ANTICOAGULANT THERAPY: ICD-10-CM

## 2018-10-02 LAB — INR BLD: 2.7

## 2018-10-02 PROCEDURE — 99211 OFF/OP EST MAY X REQ PHY/QHP: CPT

## 2018-10-02 PROCEDURE — 85610 PROTHROMBIN TIME: CPT

## 2018-10-30 ENCOUNTER — HOSPITAL ENCOUNTER (OUTPATIENT)
Dept: PHARMACY | Age: 83
Setting detail: THERAPIES SERIES
Discharge: HOME | End: 2018-10-30
Payer: MEDICARE

## 2018-10-30 VITALS
SYSTOLIC BLOOD PRESSURE: 134 MMHG | HEART RATE: 64 BPM | WEIGHT: 142.3 LBS | DIASTOLIC BLOOD PRESSURE: 72 MMHG | BODY MASS INDEX: 26.03 KG/M2

## 2018-10-30 DIAGNOSIS — Z86.718 HISTORY OF DVT OF LOWER EXTREMITY: ICD-10-CM

## 2018-10-30 DIAGNOSIS — Z79.01 LONG TERM CURRENT USE OF ANTICOAGULANT THERAPY: ICD-10-CM

## 2018-10-30 DIAGNOSIS — I35.0 AORTIC STENOSIS, SEVERE: ICD-10-CM

## 2018-10-30 LAB — INR BLD: 2.6

## 2018-10-30 PROCEDURE — 99211 OFF/OP EST MAY X REQ PHY/QHP: CPT

## 2018-10-30 PROCEDURE — 85610 PROTHROMBIN TIME: CPT

## 2018-11-27 ENCOUNTER — HOSPITAL ENCOUNTER (OUTPATIENT)
Dept: PHARMACY | Age: 83
Setting detail: THERAPIES SERIES
Discharge: HOME | End: 2018-11-27
Payer: MEDICARE

## 2018-11-27 VITALS
HEART RATE: 72 BPM | BODY MASS INDEX: 26.37 KG/M2 | SYSTOLIC BLOOD PRESSURE: 112 MMHG | WEIGHT: 144.2 LBS | DIASTOLIC BLOOD PRESSURE: 64 MMHG

## 2018-11-27 DIAGNOSIS — Z86.718 HISTORY OF DVT OF LOWER EXTREMITY: ICD-10-CM

## 2018-11-27 DIAGNOSIS — Z79.01 LONG TERM CURRENT USE OF ANTICOAGULANT THERAPY: ICD-10-CM

## 2018-11-27 DIAGNOSIS — I35.0 AORTIC STENOSIS, SEVERE: ICD-10-CM

## 2018-11-27 LAB — INR BLD: 3.2

## 2018-11-27 PROCEDURE — 99211 OFF/OP EST MAY X REQ PHY/QHP: CPT

## 2018-11-27 PROCEDURE — 85610 PROTHROMBIN TIME: CPT

## 2018-11-27 RX ORDER — ALLOPURINOL 100 MG/1
100 TABLET ORAL 2 TIMES DAILY
Qty: 180 TABLET | Refills: 3 | Status: SHIPPED | OUTPATIENT
Start: 2018-11-27 | End: 2019-02-07

## 2018-11-27 RX ORDER — LEVOTHYROXINE SODIUM 0.07 MG/1
75 TABLET ORAL DAILY
Qty: 90 TABLET | Refills: 3 | Status: SHIPPED | OUTPATIENT
Start: 2018-11-27 | End: 2019-11-29 | Stop reason: SDUPTHER

## 2018-12-11 ENCOUNTER — HOSPITAL ENCOUNTER (OUTPATIENT)
Dept: PHARMACY | Age: 83
Setting detail: THERAPIES SERIES
Discharge: HOME | End: 2018-12-11
Payer: MEDICARE

## 2018-12-11 VITALS
HEART RATE: 72 BPM | SYSTOLIC BLOOD PRESSURE: 134 MMHG | DIASTOLIC BLOOD PRESSURE: 68 MMHG | WEIGHT: 145.2 LBS | BODY MASS INDEX: 26.56 KG/M2

## 2018-12-11 DIAGNOSIS — Z79.01 LONG TERM CURRENT USE OF ANTICOAGULANT THERAPY: ICD-10-CM

## 2018-12-11 DIAGNOSIS — I35.0 AORTIC STENOSIS, SEVERE: ICD-10-CM

## 2018-12-11 DIAGNOSIS — Z86.718 HISTORY OF DVT OF LOWER EXTREMITY: ICD-10-CM

## 2018-12-11 LAB — INR BLD: 2.7

## 2018-12-11 PROCEDURE — 99211 OFF/OP EST MAY X REQ PHY/QHP: CPT

## 2018-12-11 PROCEDURE — 85610 PROTHROMBIN TIME: CPT

## 2018-12-22 ENCOUNTER — HOSPITAL ENCOUNTER (EMERGENCY)
Age: 83
Discharge: HOME | End: 2018-12-22
Attending: FAMILY MEDICINE
Payer: MEDICARE

## 2018-12-22 ENCOUNTER — APPOINTMENT (OUTPATIENT)
Dept: GENERAL RADIOLOGY | Age: 83
End: 2018-12-22
Payer: MEDICARE

## 2018-12-22 VITALS
RESPIRATION RATE: 16 BRPM | DIASTOLIC BLOOD PRESSURE: 83 MMHG | BODY MASS INDEX: 26.52 KG/M2 | TEMPERATURE: 98 F | HEART RATE: 61 BPM | WEIGHT: 145 LBS | SYSTOLIC BLOOD PRESSURE: 119 MMHG | OXYGEN SATURATION: 92 %

## 2018-12-22 DIAGNOSIS — M54.41 ACUTE RIGHT-SIDED LOW BACK PAIN WITH RIGHT-SIDED SCIATICA: Primary | ICD-10-CM

## 2018-12-22 PROCEDURE — 99283 EMERGENCY DEPT VISIT LOW MDM: CPT

## 2018-12-22 PROCEDURE — 72110 X-RAY EXAM L-2 SPINE 4/>VWS: CPT

## 2018-12-22 ASSESSMENT — PAIN DESCRIPTION - LOCATION: LOCATION: BACK

## 2018-12-22 ASSESSMENT — PAIN SCALES - GENERAL: PAINLEVEL_OUTOF10: 6

## 2018-12-22 ASSESSMENT — PAIN DESCRIPTION - DESCRIPTORS: DESCRIPTORS: STABBING

## 2018-12-22 ASSESSMENT — PAIN DESCRIPTION - PAIN TYPE: TYPE: ACUTE PAIN

## 2018-12-22 ASSESSMENT — PAIN DESCRIPTION - FREQUENCY: FREQUENCY: INTERMITTENT

## 2018-12-22 NOTE — ED PROVIDER NOTES
eMERGENCY dEPARTMENT eNCOUnter        [address] [facility]    Chief Complaint   Patient presents with    Back Pain     3rd day states she was walking when started hurting        HPI    Dustin Parker is a 80 y.o. female who presents with right sided low back pain for 3 days. No history of an acute injury. She describes her pain is severe when she walks. Pain is better when she does not move. She has taken Tylenol for her pain with minimal relief. Pain radiates into her right hip area and into her right lower leg. REVIEW OF SYSTEMS    All systems reviewed and positives in history of present illness. PAST MEDICAL HISTORY    Past Medical History:   Diagnosis Date    Acute renal failure (ARF) ([facility] [address]     7/2014    Anemia     Aortic stenosis, severe     5/2014 echo    Arthritis     right shoulder    H/O aortic valve replacement 7/29/14    [facility] Dr. Haley Gonzales Hx of CABG 7/29/14    Hypertension     Hypothyroidism     Osteoporosis        SURGICAL HISTORY    Past Surgical History:   Procedure Laterality Date    AORTIC VALVE REPLACEMENT  7/29/14    [facility], Dr. Sharon Levine Left 06/04/14    [facility] Dr. Galeas Labs 60% disease in the ostium of the right coronary artery followed by a 60% lesion in the midportion. Unremarkable left anterior descending &circumflex. Normal left ventricular function,ejection fraction of 60%. Normal left ventricular function, ejection fraction of 60%. Severe aortic stenosis with a 100 mm gradient across the aortic valve with an aortic valve area of    CARDIAC CATHETERIZATION Left 06/04/14    0.4 cm2. Moderate pulmonary hypertension with a pulmonary artery pressure of 50/30.     CARPAL TUNNEL RELEASE      bilateral     HYSTERECTOMY      PARTIAL HYSTERECTOMY         CURRENT MEDICATIONS    Current Outpatient Rx   Medication Sig Dispense Refill    allopurinol (ZYLOPRIM) 100 MG tablet Take 1 tablet by mouth 2 times daily 180

## 2018-12-22 NOTE — ED PROVIDER NOTES
[address] [facility]  eMERGENCY dEPARTMENT eNCOUnter          [address] [facility]       Chief Complaint   Patient presents with    Back Pain     3rd day states she was walking when started hurting        Nurses Notes reviewed and I agree except as noted in the HPI. HISTORY OF PRESENT ILLNESS    Inez Arroyo is a 80 y.o. female who presents To the emergency room via EMS from home with complaint of lower back pain, patient states third day of pain indicating midline lower back, nonradiating, denies dysuria denies loss of bowel or bladder continence denies urinary retention denies loss of sensation when wiping. Denies any similar prior. Patient states that she did feel a \"popping\" sensation in her lower back early on, rates pain 6 out of 10, stabbing. No trauma or falls. REVIEW OF SYSTEMS     Review of Systems   All other systems reviewed and are negative. PAST MEDICAL HISTORY    has a past medical history of Acute renal failure (ARF) ([facility] [address] Anemia; Aortic stenosis, severe; Arthritis; H/O aortic valve replacement; Hx of CABG; Hypertension; Hypothyroidism; and Osteoporosis. SURGICAL HISTORY      has a past surgical history that includes partial hysterectomy (cervix not removed); Carpal tunnel release; Cardiac catheterization (Left, 06/04/14); Cardiac catheterization (Left, 06/04/14); Appendectomy; Aortic valve replacement (7/29/14); and Hysterectomy. CURRENT MEDICATIONS       Discharge Medication List as of 12/22/2018 10:03 AM      CONTINUE these medications which have NOT CHANGED    Details   allopurinol (ZYLOPRIM) 100 MG tablet Take 1 tablet by mouth 2 times daily, Disp-180 tablet, R-3Normal      levothyroxine (SYNTHROID) 75 MCG tablet Take 1 tablet by mouth Daily, Disp-90 tablet, R-3Normal      !! warfarin (COUMADIN) 2 MG tablet Take 2 tablets (4 mg total) on Tuesdays, Wednesdays, Thursdays, Fridays, Saturdays, and Sundays and warfarin 5 mg on Mondays. , Disp-180 tablet, R-3Called into [facility] physician voicemail [phone].Phone In      losartan (COZAAR) 50 MG tablet Take 0.5 tablets by mouth 2 times daily, Disp-180 tablet, R-3Normal      diltiazem (CARTIA XT) 120 MG extended release capsule TAKE 1 CAPSULE BY MOUTH EVERY DAY, Disp-90 capsule, R-3Normal      metoprolol tartrate (LOPRESSOR) 25 MG tablet Take 1 tablet by mouth 2 times daily, Disp-180 tablet, R-3Normal      atorvastatin (LIPITOR) 10 MG tablet Take 1 tablet by mouth nightly, Disp-90 tablet, R-3Normal      Cholecalciferol (VITAMIN D3) 2000 units TABS Take 1 tablet by mouth daily Historical Med      acetaminophen (TYLENOL) 500 MG tablet Take 500-1,000 mg by mouth every 6 hours as needed for PainHistorical Med      ARTIFICIAL TEAR SOLUTION OP Apply 2 drops to eye 2 times daily as needed (dry eyes) Historical Med      aspirin 81 MG EC tablet Take 81 mg by mouth daily. docusate sodium (COLACE) 100 MG capsule Take 100-200 mg by mouth daily as needed for Constipation Historical Med      !! warfarin (COUMADIN) 5 MG tablet Take 1 tablet daily on  only and take 4 mg daily all other days, Disp-30 tablet, R-11Called into [facility] Physician Refill Line [phone] per Dr. Mahad Dean. Normal       !! - Potential duplicate medications found. Please discuss with provider. ALLERGIES     is allergic to aspirin and codeine. FAMILY HISTORY     indicated that her mother is . She indicated that her father is . She indicated that her sister is . She indicated that both of her brothers are alive. She indicated that her maternal aunt is alive. family history includes Heart Disease in her brother. SOCIAL HISTORY      reports that she quit smoking about 36 years ago. Her smoking use included Cigarettes. She has a 35.00 pack-year smoking history. She has never used smokeless tobacco. She reports that she does not drink alcohol or use drugs.     PHYSICAL EXAM     INITIAL VITALS:

## 2018-12-31 ENCOUNTER — OFFICE VISIT (OUTPATIENT)
Dept: FAMILY MEDICINE CLINIC | Age: 83
End: 2018-12-31
Payer: MEDICARE

## 2018-12-31 VITALS
SYSTOLIC BLOOD PRESSURE: 136 MMHG | TEMPERATURE: 97.8 F | DIASTOLIC BLOOD PRESSURE: 78 MMHG | OXYGEN SATURATION: 96 % | HEART RATE: 72 BPM | WEIGHT: 146 LBS | BODY MASS INDEX: 26.7 KG/M2

## 2018-12-31 DIAGNOSIS — M43.10 ANTEROLISTHESIS: ICD-10-CM

## 2018-12-31 DIAGNOSIS — M41.80 DEXTROSCOLIOSIS: ICD-10-CM

## 2018-12-31 DIAGNOSIS — M54.41 ACUTE RIGHT-SIDED LOW BACK PAIN WITH RIGHT-SIDED SCIATICA: Primary | ICD-10-CM

## 2018-12-31 PROCEDURE — G8417 CALC BMI ABV UP PARAM F/U: HCPCS | Performed by: NURSE PRACTITIONER

## 2018-12-31 PROCEDURE — 1123F ACP DISCUSS/DSCN MKR DOCD: CPT | Performed by: NURSE PRACTITIONER

## 2018-12-31 PROCEDURE — 1036F TOBACCO NON-USER: CPT | Performed by: NURSE PRACTITIONER

## 2018-12-31 PROCEDURE — 1101F PT FALLS ASSESS-DOCD LE1/YR: CPT | Performed by: NURSE PRACTITIONER

## 2018-12-31 PROCEDURE — G8427 DOCREV CUR MEDS BY ELIG CLIN: HCPCS | Performed by: NURSE PRACTITIONER

## 2018-12-31 PROCEDURE — 1090F PRES/ABSN URINE INCON ASSESS: CPT | Performed by: NURSE PRACTITIONER

## 2018-12-31 PROCEDURE — 4040F PNEUMOC VAC/ADMIN/RCVD: CPT | Performed by: NURSE PRACTITIONER

## 2018-12-31 PROCEDURE — 99213 OFFICE O/P EST LOW 20 MIN: CPT | Performed by: NURSE PRACTITIONER

## 2018-12-31 PROCEDURE — G8482 FLU IMMUNIZE ORDER/ADMIN: HCPCS | Performed by: NURSE PRACTITIONER

## 2018-12-31 NOTE — PROGRESS NOTES
as needed for Pain   Yes Historical Provider, MD   ARTIFICIAL TEAR SOLUTION OP Apply 2 drops to eye 2 times daily as needed (dry eyes)  7/18/17  Yes Kimi Torre   aspirin 81 MG EC tablet Take 81 mg by mouth daily. Yes Historical Provider, MD   docusate sodium (COLACE) 100 MG capsule Take 100-200 mg by mouth daily as needed for Constipation    Yes Historical Provider, MD        Allergies:       Aspirin and Codeine    Social History:     Tobacco:    reports that she quit smoking about 36 years ago. Her smoking use included Cigarettes. She has a 35.00 pack-year smoking history. She has never used smokeless tobacco.  Alcohol:      reports that she does not drink alcohol. Drug Use:  reports that she does not use drugs. Family History:     Family History   Problem Relation Age of Onset    Heart Disease Brother        Review of Systems:         Review of Systems   Constitutional: Negative for chills and fever. Respiratory: Negative for cough and shortness of breath. Cardiovascular: Negative for chest pain and palpitations. Gastrointestinal: Negative for diarrhea, nausea and vomiting. Musculoskeletal: Positive for back pain (right hip pain). Neurological: Negative for tingling and numbness. Physical Exam:     Vitals:  /78   Pulse 72   Temp 97.8 °F (36.6 °C) (Oral)   Wt 146 lb (66.2 kg)   SpO2 96%   BMI 26.70 kg/m²       Physical Exam   Constitutional: She is oriented to person, place, and time. She appears well-developed and well-nourished. Cardiovascular: Normal rate and regular rhythm. Pulmonary/Chest: Effort normal and breath sounds normal. No respiratory distress. Musculoskeletal:        Lumbar back: She exhibits pain (right mid-buttock). She exhibits no bony tenderness and no spasm. Back:    No bony tenderness at L3-L5. Mild pain in right mid-buttock with palpation. No radiation down leg   Neurological: She is alert and oriented to person, place, and time.

## 2019-01-01 ENCOUNTER — HOSPITAL ENCOUNTER (EMERGENCY)
Age: 84
Discharge: HOME | End: 2019-12-23
Attending: EMERGENCY MEDICINE
Payer: MEDICARE

## 2019-01-01 ENCOUNTER — HOSPITAL ENCOUNTER (OUTPATIENT)
Dept: PHARMACY | Age: 84
Setting detail: THERAPIES SERIES
Discharge: HOME | End: 2019-12-17
Payer: MEDICARE

## 2019-01-01 VITALS
SYSTOLIC BLOOD PRESSURE: 148 MMHG | HEART RATE: 66 BPM | DIASTOLIC BLOOD PRESSURE: 62 MMHG | TEMPERATURE: 98.7 F | HEIGHT: 62 IN | OXYGEN SATURATION: 96 % | BODY MASS INDEX: 27.23 KG/M2 | WEIGHT: 148 LBS | RESPIRATION RATE: 16 BRPM

## 2019-01-01 VITALS
SYSTOLIC BLOOD PRESSURE: 142 MMHG | BODY MASS INDEX: 27.59 KG/M2 | WEIGHT: 146 LBS | DIASTOLIC BLOOD PRESSURE: 73 MMHG | HEART RATE: 61 BPM

## 2019-01-01 DIAGNOSIS — Z86.718 HISTORY OF DVT OF LOWER EXTREMITY: ICD-10-CM

## 2019-01-01 DIAGNOSIS — R04.0 EPISTAXIS: Primary | ICD-10-CM

## 2019-01-01 DIAGNOSIS — Z79.01 LONG TERM CURRENT USE OF ANTICOAGULANT THERAPY: ICD-10-CM

## 2019-01-01 DIAGNOSIS — I35.0 AORTIC STENOSIS, SEVERE: ICD-10-CM

## 2019-01-01 LAB — INR BLD: 1.9

## 2019-01-01 PROCEDURE — 99211 OFF/OP EST MAY X REQ PHY/QHP: CPT

## 2019-01-01 PROCEDURE — 85610 PROTHROMBIN TIME: CPT

## 2019-01-01 PROCEDURE — 99283 EMERGENCY DEPT VISIT LOW MDM: CPT

## 2019-01-08 ENCOUNTER — HOSPITAL ENCOUNTER (OUTPATIENT)
Dept: PHARMACY | Age: 84
Setting detail: THERAPIES SERIES
Discharge: HOME | End: 2019-01-08
Payer: MEDICARE

## 2019-01-08 VITALS
HEART RATE: 64 BPM | DIASTOLIC BLOOD PRESSURE: 64 MMHG | SYSTOLIC BLOOD PRESSURE: 114 MMHG | WEIGHT: 143.4 LBS | BODY MASS INDEX: 26.23 KG/M2

## 2019-01-08 DIAGNOSIS — I35.0 AORTIC STENOSIS, SEVERE: ICD-10-CM

## 2019-01-08 DIAGNOSIS — Z79.01 LONG TERM CURRENT USE OF ANTICOAGULANT THERAPY: ICD-10-CM

## 2019-01-08 DIAGNOSIS — Z86.718 HISTORY OF DVT OF LOWER EXTREMITY: ICD-10-CM

## 2019-01-08 LAB — INR BLD: 2.7

## 2019-01-08 PROCEDURE — 85610 PROTHROMBIN TIME: CPT

## 2019-01-08 PROCEDURE — 99211 OFF/OP EST MAY X REQ PHY/QHP: CPT

## 2019-02-07 ENCOUNTER — HOSPITAL ENCOUNTER (OUTPATIENT)
Dept: PHARMACY | Age: 84
Setting detail: THERAPIES SERIES
Discharge: HOME | End: 2019-02-07
Payer: MEDICARE

## 2019-02-07 ENCOUNTER — OFFICE VISIT (OUTPATIENT)
Dept: FAMILY MEDICINE CLINIC | Age: 84
End: 2019-02-07
Payer: MEDICARE

## 2019-02-07 VITALS
WEIGHT: 146.4 LBS | HEART RATE: 75 BPM | DIASTOLIC BLOOD PRESSURE: 73 MMHG | SYSTOLIC BLOOD PRESSURE: 123 MMHG | BODY MASS INDEX: 26.78 KG/M2

## 2019-02-07 VITALS
BODY MASS INDEX: 26.89 KG/M2 | SYSTOLIC BLOOD PRESSURE: 130 MMHG | OXYGEN SATURATION: 94 % | HEART RATE: 68 BPM | WEIGHT: 147 LBS | DIASTOLIC BLOOD PRESSURE: 70 MMHG

## 2019-02-07 DIAGNOSIS — I10 ESSENTIAL HYPERTENSION, BENIGN: Primary | ICD-10-CM

## 2019-02-07 DIAGNOSIS — E03.9 ACQUIRED HYPOTHYROIDISM: ICD-10-CM

## 2019-02-07 DIAGNOSIS — E78.00 HYPERCHOLESTEREMIA: ICD-10-CM

## 2019-02-07 DIAGNOSIS — Z79.01 LONG TERM CURRENT USE OF ANTICOAGULANT THERAPY: ICD-10-CM

## 2019-02-07 DIAGNOSIS — Z86.718 HISTORY OF DVT OF LOWER EXTREMITY: ICD-10-CM

## 2019-02-07 DIAGNOSIS — I35.0 AORTIC STENOSIS, SEVERE: ICD-10-CM

## 2019-02-07 DIAGNOSIS — I48.20 CHRONIC ATRIAL FIBRILLATION (HCC): ICD-10-CM

## 2019-02-07 DIAGNOSIS — M19.049 HAND ARTHRITIS: ICD-10-CM

## 2019-02-07 LAB — INR BLD: 2.9

## 2019-02-07 PROCEDURE — 4040F PNEUMOC VAC/ADMIN/RCVD: CPT | Performed by: FAMILY MEDICINE

## 2019-02-07 PROCEDURE — 1101F PT FALLS ASSESS-DOCD LE1/YR: CPT | Performed by: FAMILY MEDICINE

## 2019-02-07 PROCEDURE — 99214 OFFICE O/P EST MOD 30 MIN: CPT | Performed by: FAMILY MEDICINE

## 2019-02-07 PROCEDURE — 85610 PROTHROMBIN TIME: CPT

## 2019-02-07 PROCEDURE — G8427 DOCREV CUR MEDS BY ELIG CLIN: HCPCS | Performed by: FAMILY MEDICINE

## 2019-02-07 PROCEDURE — 1123F ACP DISCUSS/DSCN MKR DOCD: CPT | Performed by: FAMILY MEDICINE

## 2019-02-07 PROCEDURE — 1036F TOBACCO NON-USER: CPT | Performed by: FAMILY MEDICINE

## 2019-02-07 PROCEDURE — G8482 FLU IMMUNIZE ORDER/ADMIN: HCPCS | Performed by: FAMILY MEDICINE

## 2019-02-07 PROCEDURE — G8417 CALC BMI ABV UP PARAM F/U: HCPCS | Performed by: FAMILY MEDICINE

## 2019-02-07 PROCEDURE — 1090F PRES/ABSN URINE INCON ASSESS: CPT | Performed by: FAMILY MEDICINE

## 2019-02-07 PROCEDURE — 99212 OFFICE O/P EST SF 10 MIN: CPT

## 2019-02-07 RX ORDER — WARFARIN SODIUM 5 MG/1
TABLET ORAL
Qty: 30 TABLET | Refills: 11 | OUTPATIENT
Start: 2019-02-07 | End: 2019-02-18 | Stop reason: SDUPTHER

## 2019-02-07 ASSESSMENT — ENCOUNTER SYMPTOMS
FACIAL SWELLING: 0
EYE REDNESS: 0
COUGH: 0
BLURRED VISION: 0
NAUSEA: 0
ABDOMINAL PAIN: 0
EYE DISCHARGE: 0
SHORTNESS OF BREATH: 0
BLOOD IN STOOL: 0
DIARRHEA: 0
VOMITING: 0
CONSTIPATION: 0

## 2019-02-07 ASSESSMENT — PATIENT HEALTH QUESTIONNAIRE - PHQ9
1. LITTLE INTEREST OR PLEASURE IN DOING THINGS: 0
2. FEELING DOWN, DEPRESSED OR HOPELESS: 0
SUM OF ALL RESPONSES TO PHQ QUESTIONS 1-9: 0
SUM OF ALL RESPONSES TO PHQ QUESTIONS 1-9: 0
SUM OF ALL RESPONSES TO PHQ9 QUESTIONS 1 & 2: 0

## 2019-02-18 DIAGNOSIS — I35.0 AORTIC STENOSIS, SEVERE: ICD-10-CM

## 2019-02-18 DIAGNOSIS — Z79.01 LONG TERM CURRENT USE OF ANTICOAGULANT THERAPY: ICD-10-CM

## 2019-02-18 RX ORDER — WARFARIN SODIUM 5 MG/1
TABLET ORAL
Qty: 180 TABLET | Refills: 3 | Status: SHIPPED | OUTPATIENT
Start: 2019-02-18 | End: 2019-05-07 | Stop reason: DRUGHIGH

## 2019-02-18 RX ORDER — WARFARIN SODIUM 2 MG/1
TABLET ORAL
Qty: 180 TABLET | Refills: 3 | Status: SHIPPED | OUTPATIENT
Start: 2019-02-18 | End: 2019-05-07 | Stop reason: DRUGHIGH

## 2019-03-12 ENCOUNTER — HOSPITAL ENCOUNTER (OUTPATIENT)
Dept: PHARMACY | Age: 84
Setting detail: THERAPIES SERIES
Discharge: HOME | End: 2019-03-12
Payer: MEDICARE

## 2019-03-12 VITALS
WEIGHT: 147 LBS | SYSTOLIC BLOOD PRESSURE: 136 MMHG | BODY MASS INDEX: 26.89 KG/M2 | DIASTOLIC BLOOD PRESSURE: 64 MMHG | HEART RATE: 64 BPM

## 2019-03-12 DIAGNOSIS — I35.0 AORTIC STENOSIS, SEVERE: ICD-10-CM

## 2019-03-12 DIAGNOSIS — Z86.718 HISTORY OF DVT OF LOWER EXTREMITY: ICD-10-CM

## 2019-03-12 DIAGNOSIS — Z79.01 LONG TERM CURRENT USE OF ANTICOAGULANT THERAPY: ICD-10-CM

## 2019-03-12 LAB — INR BLD: 3.1

## 2019-03-12 PROCEDURE — 85610 PROTHROMBIN TIME: CPT

## 2019-03-12 PROCEDURE — 99211 OFF/OP EST MAY X REQ PHY/QHP: CPT

## 2019-03-12 RX ORDER — ALLOPURINOL 100 MG/1
100 TABLET ORAL 2 TIMES DAILY
COMMUNITY
End: 2019-01-01 | Stop reason: SDUPTHER

## 2019-04-02 ENCOUNTER — HOSPITAL ENCOUNTER (OUTPATIENT)
Dept: PHARMACY | Age: 84
Setting detail: THERAPIES SERIES
Discharge: HOME | End: 2019-04-02
Payer: MEDICARE

## 2019-04-02 VITALS
BODY MASS INDEX: 26.52 KG/M2 | DIASTOLIC BLOOD PRESSURE: 60 MMHG | HEART RATE: 56 BPM | WEIGHT: 145 LBS | SYSTOLIC BLOOD PRESSURE: 131 MMHG

## 2019-04-02 DIAGNOSIS — I35.0 AORTIC STENOSIS, SEVERE: ICD-10-CM

## 2019-04-02 DIAGNOSIS — Z86.718 HISTORY OF DVT OF LOWER EXTREMITY: ICD-10-CM

## 2019-04-02 DIAGNOSIS — Z79.01 LONG TERM CURRENT USE OF ANTICOAGULANT THERAPY: ICD-10-CM

## 2019-04-02 LAB — INR BLD: 2.3

## 2019-04-02 PROCEDURE — 99211 OFF/OP EST MAY X REQ PHY/QHP: CPT

## 2019-04-02 PROCEDURE — 85610 PROTHROMBIN TIME: CPT

## 2019-04-02 NOTE — PROGRESS NOTES
Fingerstick INR drawn per clinic protocol. Patient states no visible blood in urine and no black tarry stool. Denies any missed doses of warfarin. No change in other maintenance medications or in diet. All medication bottles reviewed for accuracy. Ansley Conti states she still has \"dizziness all the darn time. \" She states about 1 week ago she hit her right breast on the car door mirror and she has a bruise. Since Pat's INR remains therapeutic, we will continue current weekly warfarin regimen and recheck INR in 5 week(s). Patient acknowledges working in consult agreement with pharmacist as referred by his/her physician.

## 2019-05-07 ENCOUNTER — HOSPITAL ENCOUNTER (OUTPATIENT)
Dept: PHARMACY | Age: 84
Setting detail: THERAPIES SERIES
Discharge: HOME | End: 2019-05-07
Payer: MEDICARE

## 2019-05-07 VITALS
DIASTOLIC BLOOD PRESSURE: 64 MMHG | HEART RATE: 60 BPM | BODY MASS INDEX: 26.92 KG/M2 | WEIGHT: 147.2 LBS | SYSTOLIC BLOOD PRESSURE: 133 MMHG

## 2019-05-07 DIAGNOSIS — Z86.718 HISTORY OF DVT OF LOWER EXTREMITY: ICD-10-CM

## 2019-05-07 DIAGNOSIS — I35.0 AORTIC STENOSIS, SEVERE: ICD-10-CM

## 2019-05-07 DIAGNOSIS — Z79.01 LONG TERM CURRENT USE OF ANTICOAGULANT THERAPY: ICD-10-CM

## 2019-05-07 LAB — INR BLD: 1.9

## 2019-05-07 PROCEDURE — 99211 OFF/OP EST MAY X REQ PHY/QHP: CPT

## 2019-05-07 PROCEDURE — 85610 PROTHROMBIN TIME: CPT

## 2019-05-07 RX ORDER — WARFARIN SODIUM 5 MG/1
TABLET ORAL
Qty: 180 TABLET | Refills: 3 | Status: SHIPPED
Start: 2019-05-07 | End: 2019-10-15 | Stop reason: DRUGHIGH

## 2019-05-07 RX ORDER — WARFARIN SODIUM 2 MG/1
TABLET ORAL
Qty: 180 TABLET | Refills: 3 | Status: SHIPPED
Start: 2019-05-07 | End: 2019-10-14 | Stop reason: SDUPTHER

## 2019-05-07 NOTE — PROGRESS NOTES
Fingerstick INR drawn per clinic protocol. Patient states no visible blood in urine and no black tarry stool. Denies any missed doses of warfarin. No change in other maintenance medications or in diet. Since her INR was on the lower end of the therapeutic range during her last visit and has dropped slightly sub-therapeutic today, we will increase current weekly warfarin regimen very slightly (3%) as noted on her dosing calendar. She will take 5 mg warfarin on Tuesdays and 4 mg warfarin all other days of the week and we will recheck INR in 5 weeks. Patient acknowledges working in consult agreement with pharmacist as referred by his/her physician.

## 2019-06-11 ENCOUNTER — HOSPITAL ENCOUNTER (OUTPATIENT)
Dept: PHARMACY | Age: 84
Setting detail: THERAPIES SERIES
Discharge: HOME | End: 2019-06-11
Payer: MEDICARE

## 2019-06-11 VITALS
WEIGHT: 146.2 LBS | BODY MASS INDEX: 26.74 KG/M2 | SYSTOLIC BLOOD PRESSURE: 135 MMHG | HEART RATE: 55 BPM | DIASTOLIC BLOOD PRESSURE: 54 MMHG

## 2019-06-11 DIAGNOSIS — Z79.01 LONG TERM CURRENT USE OF ANTICOAGULANT THERAPY: ICD-10-CM

## 2019-06-11 DIAGNOSIS — Z86.718 HISTORY OF DVT OF LOWER EXTREMITY: ICD-10-CM

## 2019-06-11 DIAGNOSIS — I35.0 AORTIC STENOSIS, SEVERE: ICD-10-CM

## 2019-06-11 LAB — INR BLD: 2.4

## 2019-06-11 PROCEDURE — 85610 PROTHROMBIN TIME: CPT

## 2019-06-11 PROCEDURE — 99211 OFF/OP EST MAY X REQ PHY/QHP: CPT

## 2019-06-14 RX ORDER — DILTIAZEM HYDROCHLORIDE 120 MG/1
CAPSULE, COATED, EXTENDED RELEASE ORAL
Qty: 90 CAPSULE | Refills: 3 | Status: SHIPPED | OUTPATIENT
Start: 2019-06-14 | End: 2020-01-01 | Stop reason: SDUPTHER

## 2019-06-18 RX ORDER — ATORVASTATIN CALCIUM 10 MG/1
10 TABLET, FILM COATED ORAL NIGHTLY
Qty: 90 TABLET | Refills: 3 | Status: SHIPPED | OUTPATIENT
Start: 2019-06-18 | End: 2020-01-01 | Stop reason: SDUPTHER

## 2019-07-16 ENCOUNTER — HOSPITAL ENCOUNTER (OUTPATIENT)
Dept: PHARMACY | Age: 84
Setting detail: THERAPIES SERIES
Discharge: HOME | End: 2019-07-16
Payer: MEDICARE

## 2019-07-16 ENCOUNTER — HOSPITAL ENCOUNTER (OUTPATIENT)
Age: 84
Discharge: HOME | End: 2019-07-16
Payer: MEDICARE

## 2019-07-16 ENCOUNTER — HOSPITAL ENCOUNTER (OUTPATIENT)
Age: 84
Discharge: HOME | End: 2019-07-18
Payer: MEDICARE

## 2019-07-16 ENCOUNTER — HOSPITAL ENCOUNTER (OUTPATIENT)
Dept: NON INVASIVE DIAGNOSTICS | Age: 84
Discharge: HOME | End: 2019-07-16
Payer: MEDICARE

## 2019-07-16 ENCOUNTER — HOSPITAL ENCOUNTER (OUTPATIENT)
Dept: GENERAL RADIOLOGY | Age: 84
Discharge: HOME | End: 2019-07-18
Payer: MEDICARE

## 2019-07-16 VITALS
SYSTOLIC BLOOD PRESSURE: 124 MMHG | WEIGHT: 144.6 LBS | BODY MASS INDEX: 26.45 KG/M2 | HEART RATE: 60 BPM | DIASTOLIC BLOOD PRESSURE: 65 MMHG

## 2019-07-16 DIAGNOSIS — I35.0 AORTIC STENOSIS, SEVERE: ICD-10-CM

## 2019-07-16 DIAGNOSIS — Z95.2 H/O AORTIC VALVE REPLACEMENT: ICD-10-CM

## 2019-07-16 DIAGNOSIS — I10 ESSENTIAL HYPERTENSION: ICD-10-CM

## 2019-07-16 DIAGNOSIS — E55.9 VITAMIN D DEFICIENCY DISEASE: ICD-10-CM

## 2019-07-16 DIAGNOSIS — I10 ESSENTIAL HYPERTENSION, BENIGN: ICD-10-CM

## 2019-07-16 DIAGNOSIS — Z79.01 LONG TERM CURRENT USE OF ANTICOAGULANT THERAPY: ICD-10-CM

## 2019-07-16 DIAGNOSIS — I48.3 TYPICAL ATRIAL FLUTTER (HCC): ICD-10-CM

## 2019-07-16 DIAGNOSIS — Z86.718 HISTORY OF DVT OF LOWER EXTREMITY: ICD-10-CM

## 2019-07-16 LAB
ABSOLUTE EOS #: 0.1 K/UL (ref 0–0.4)
ABSOLUTE IMMATURE GRANULOCYTE: ABNORMAL K/UL (ref 0–0.3)
ABSOLUTE LYMPH #: 1.6 K/UL (ref 1–4.8)
ABSOLUTE MONO #: 0.5 K/UL (ref 0–1)
ALBUMIN SERPL-MCNC: 4.4 G/DL (ref 3.5–5.2)
ALBUMIN/GLOBULIN RATIO: ABNORMAL (ref 1–2.5)
ALP BLD-CCNC: 86 U/L (ref 35–104)
ALT SERPL-CCNC: 8 U/L (ref 5–33)
ANION GAP SERPL CALCULATED.3IONS-SCNC: 14 MMOL/L (ref 9–17)
AST SERPL-CCNC: 20 U/L
BASOPHILS # BLD: 1 % (ref 0–2)
BASOPHILS ABSOLUTE: 0 K/UL (ref 0–0.2)
BILIRUB SERPL-MCNC: 0.67 MG/DL (ref 0.3–1.2)
BUN BLDV-MCNC: 18 MG/DL (ref 8–23)
BUN/CREAT BLD: 17 (ref 9–20)
CALCIUM SERPL-MCNC: 10.3 MG/DL (ref 8.6–10.4)
CHLORIDE BLD-SCNC: 103 MMOL/L (ref 98–107)
CHOLESTEROL/HDL RATIO: 1.9
CHOLESTEROL: 160 MG/DL
CO2: 25 MMOL/L (ref 20–31)
CREAT SERPL-MCNC: 1.07 MG/DL (ref 0.5–0.9)
DIFFERENTIAL TYPE: YES
EOSINOPHILS RELATIVE PERCENT: 2 % (ref 0–5)
GFR AFRICAN AMERICAN: 59 ML/MIN
GFR NON-AFRICAN AMERICAN: 48 ML/MIN
GFR SERPL CREATININE-BSD FRML MDRD: ABNORMAL ML/MIN/{1.73_M2}
GFR SERPL CREATININE-BSD FRML MDRD: ABNORMAL ML/MIN/{1.73_M2}
GLUCOSE BLD-MCNC: 96 MG/DL (ref 70–99)
HCT VFR BLD CALC: 36.8 % (ref 36–46)
HDLC SERPL-MCNC: 85 MG/DL
HEMOGLOBIN: 12.2 G/DL (ref 12–16)
IMMATURE GRANULOCYTES: ABNORMAL %
INR BLD: 2.4
LDL CHOLESTEROL: 62 MG/DL (ref 0–130)
LV EF: 55 %
LVEF MODALITY: NORMAL
LYMPHOCYTES # BLD: 25 % (ref 15–40)
MAGNESIUM: 1.4 MG/DL (ref 1.6–2.6)
MCH RBC QN AUTO: 30.4 PG (ref 26–34)
MCHC RBC AUTO-ENTMCNC: 33 G/DL (ref 31–37)
MCV RBC AUTO: 92 FL (ref 80–100)
MONOCYTES # BLD: 8 % (ref 4–8)
NRBC AUTOMATED: ABNORMAL PER 100 WBC
PATIENT FASTING?: YES
PDW BLD-RTO: 15.7 % (ref 12.1–15.2)
PLATELET # BLD: 155 K/UL (ref 140–450)
PLATELET ESTIMATE: ABNORMAL
PMV BLD AUTO: ABNORMAL FL (ref 6–12)
POTASSIUM SERPL-SCNC: 3.8 MMOL/L (ref 3.7–5.3)
RBC # BLD: 4 M/UL (ref 4–5.2)
RBC # BLD: ABNORMAL 10*6/UL
SEG NEUTROPHILS: 64 % (ref 47–75)
SEGMENTED NEUTROPHILS ABSOLUTE COUNT: 4.3 K/UL (ref 2.5–7)
SODIUM BLD-SCNC: 142 MMOL/L (ref 135–144)
TOTAL PROTEIN: 7.6 G/DL (ref 6.4–8.3)
TRIGL SERPL-MCNC: 66 MG/DL
TSH SERPL DL<=0.05 MIU/L-ACNC: 3.1 MIU/L (ref 0.3–5)
VITAMIN D 25-HYDROXY: 52.9 NG/ML (ref 30–100)
VLDLC SERPL CALC-MCNC: NORMAL MG/DL (ref 1–30)
WBC # BLD: 6.5 K/UL (ref 3.5–11)
WBC # BLD: ABNORMAL 10*3/UL

## 2019-07-16 PROCEDURE — 84443 ASSAY THYROID STIM HORMONE: CPT

## 2019-07-16 PROCEDURE — 36415 COLL VENOUS BLD VENIPUNCTURE: CPT

## 2019-07-16 PROCEDURE — 99211 OFF/OP EST MAY X REQ PHY/QHP: CPT

## 2019-07-16 PROCEDURE — 80061 LIPID PANEL: CPT

## 2019-07-16 PROCEDURE — 83735 ASSAY OF MAGNESIUM: CPT

## 2019-07-16 PROCEDURE — 93005 ELECTROCARDIOGRAM TRACING: CPT

## 2019-07-16 PROCEDURE — 93306 TTE W/DOPPLER COMPLETE: CPT

## 2019-07-16 PROCEDURE — 71046 X-RAY EXAM CHEST 2 VIEWS: CPT

## 2019-07-16 PROCEDURE — 82306 VITAMIN D 25 HYDROXY: CPT

## 2019-07-16 PROCEDURE — 85610 PROTHROMBIN TIME: CPT

## 2019-07-16 PROCEDURE — 85025 COMPLETE CBC W/AUTO DIFF WBC: CPT

## 2019-07-16 PROCEDURE — 80053 COMPREHEN METABOLIC PANEL: CPT

## 2019-07-18 LAB
EKG ATRIAL RATE: 63 BPM
EKG P AXIS: 89 DEGREES
EKG P-R INTERVAL: 234 MS
EKG Q-T INTERVAL: 474 MS
EKG QRS DURATION: 138 MS
EKG QTC CALCULATION (BAZETT): 485 MS
EKG R AXIS: 74 DEGREES
EKG T AXIS: 60 DEGREES
EKG VENTRICULAR RATE: 63 BPM

## 2019-07-18 PROCEDURE — 93010 ELECTROCARDIOGRAM REPORT: CPT | Performed by: INTERNAL MEDICINE

## 2019-07-22 ENCOUNTER — OFFICE VISIT (OUTPATIENT)
Dept: CARDIOLOGY CLINIC | Age: 84
End: 2019-07-22
Payer: MEDICARE

## 2019-07-22 VITALS
SYSTOLIC BLOOD PRESSURE: 144 MMHG | DIASTOLIC BLOOD PRESSURE: 60 MMHG | OXYGEN SATURATION: 94 % | BODY MASS INDEX: 26.16 KG/M2 | WEIGHT: 143 LBS | HEART RATE: 62 BPM

## 2019-07-22 DIAGNOSIS — I10 ESSENTIAL HYPERTENSION, BENIGN: ICD-10-CM

## 2019-07-22 DIAGNOSIS — E55.9 VITAMIN D DEFICIENCY DISEASE: ICD-10-CM

## 2019-07-22 DIAGNOSIS — I35.0 AORTIC STENOSIS, SEVERE: ICD-10-CM

## 2019-07-22 DIAGNOSIS — Z95.2 H/O AORTIC VALVE REPLACEMENT: ICD-10-CM

## 2019-07-22 DIAGNOSIS — Z79.01 LONG TERM CURRENT USE OF ANTICOAGULANT THERAPY: Primary | ICD-10-CM

## 2019-07-22 DIAGNOSIS — I10 HYPERTENSION, UNSPECIFIED TYPE: ICD-10-CM

## 2019-07-22 PROCEDURE — G8427 DOCREV CUR MEDS BY ELIG CLIN: HCPCS | Performed by: INTERNAL MEDICINE

## 2019-07-22 PROCEDURE — 1036F TOBACCO NON-USER: CPT | Performed by: INTERNAL MEDICINE

## 2019-07-22 PROCEDURE — G8417 CALC BMI ABV UP PARAM F/U: HCPCS | Performed by: INTERNAL MEDICINE

## 2019-07-22 PROCEDURE — 1090F PRES/ABSN URINE INCON ASSESS: CPT | Performed by: INTERNAL MEDICINE

## 2019-07-22 PROCEDURE — 4040F PNEUMOC VAC/ADMIN/RCVD: CPT | Performed by: INTERNAL MEDICINE

## 2019-07-22 PROCEDURE — 1123F ACP DISCUSS/DSCN MKR DOCD: CPT | Performed by: INTERNAL MEDICINE

## 2019-07-22 PROCEDURE — 99214 OFFICE O/P EST MOD 30 MIN: CPT | Performed by: INTERNAL MEDICINE

## 2019-07-22 RX ORDER — MAGNESIUM OXIDE 400 MG/1
400 TABLET ORAL DAILY
Qty: 30 TABLET | Refills: 11 | Status: SHIPPED | OUTPATIENT
Start: 2019-07-22 | End: 2020-01-01 | Stop reason: SDUPTHER

## 2019-07-22 NOTE — LETTER
blood pressure very elevated. Therefore, before I examined her, I had her try to picture Antony Mckeon rather than Latoya Mak. She calmed down immediately. .. CARDIAC RISK FACTORS:  Known CAD:  Positive. Hypertension:  Positive. Other Family Members:  Positive. Peripheral Vascular Disease:  Negative. Hyperlipidemia:  Positive. Smoking:  Negative. Diabetes:  Negative. MEDICATIONS AT HOME:  She is currently on Tylenol p.r.n., Zyloprim 100 mg b.i.d., aspirin 81 mg daily, Lipitor 10 mg nightly, vitamin D 2000 units daily, Cartia  mg daily, Synthroid 75 mcg daily, Cozaar 50 mg half a tablet b.i.d., Lopressor 25 mg b.i.d., Coumadin as directed. PAST MEDICAL HISTORY:  1. Emphysema. 2.  Mild pulmonary hypertension, with a PA pressure of 50.  3.  Partial hysterectomy. 4.  Carpal tunnel surgery. 5.  Euthyroid, on treatment. 6.  Hypertension, very labile, usually elevated in our office or when she thinks about Latoya Mak. 7.  She was in chronic atrial flutter, on Coumadin although her EKG done for this appointment showed sinus rhythm. 8.  Aortic valve replacement with a #23 [employer] pericardial Trifecta bioprosthetic aortic valve. FAMILY HISTORY:  Mother had CAD. SOCIAL HISTORY:  She is 80years old, has lived with [facility] for [facility] years. Has 6 children, 1 , 5 in this area. Again, she when asked about the Coumadin Clinic, she said she had seen \"Roger Roger\" 2 days ago. When she mentioned his name, she jumped out of the chair and tried to shake off her excitement. I mentioned Antony Mckeon and she developed a look of depression and sunk back into her chair. .. She does not smoke or drink alcohol. She enjoys the warm weather and has a small garden. She tries to stay active. REVIEW OF SYSTEMS:  Cardiac as above.   Other systems reviewed including constitutional, eyes, ears, nose and throat, cardiovascular, respiratory, GI, , musculoskeletal, integumentary, neurologic, psychiatric, Her echo shows at least moderate aortic insufficiency, which is about the same as it had been previously. She has moderate-to-severe to severe pulmonary hypertension with a PA pressure of 55 to 60 secondary to her COPD. She also has moderate MR but again there has been no significant change in her echo from last year. Her blood pressure is relatively well controlled. It is usually mildly up in our office but it is overall well controlled. Her EKG was interesting in that it appears that she was in a sinus rhythm. It is possible that she could have a flutter wave buried in her T wave, although it was clearly different than last year's EKG. Therefore, I think it does represent a sinus rhythm. She will almost certainly go back into atrial fibrillation or atrial flutter. She is protected with Coumadin and she is asymptomatic when she is in atrial flutter. I made no change in her medications. She clinically is doing well. She has moderate aortic insufficiency but again is overall doing well. I will see her in 1 year in followup. If she develops any unusual shortness of breath or loss of energy, I would of course want to see her earlier. She is a delight to see and I look forward to seeing her in 1 year. Thank you very much for allowing me the privilege of seeing Ms. Tamara Munoz.      Sincerely,        Sharyle Pile    D: 07/22/2019 8:59:42     T: 07/22/2019 21:57:18     [email or web address]  Job#: [id number]   Doc#: [id number]

## 2019-08-20 ENCOUNTER — HOSPITAL ENCOUNTER (OUTPATIENT)
Dept: PHARMACY | Age: 84
Setting detail: THERAPIES SERIES
Discharge: HOME | End: 2019-08-20
Payer: MEDICARE

## 2019-08-20 VITALS
DIASTOLIC BLOOD PRESSURE: 65 MMHG | BODY MASS INDEX: 26.23 KG/M2 | WEIGHT: 143.4 LBS | SYSTOLIC BLOOD PRESSURE: 131 MMHG | HEART RATE: 55 BPM

## 2019-08-20 DIAGNOSIS — Z79.01 LONG TERM CURRENT USE OF ANTICOAGULANT THERAPY: ICD-10-CM

## 2019-08-20 DIAGNOSIS — I35.0 AORTIC STENOSIS, SEVERE: ICD-10-CM

## 2019-08-20 DIAGNOSIS — Z86.718 HISTORY OF DVT OF LOWER EXTREMITY: ICD-10-CM

## 2019-08-20 LAB — INR BLD: 1.7

## 2019-08-20 PROCEDURE — 99211 OFF/OP EST MAY X REQ PHY/QHP: CPT

## 2019-08-20 PROCEDURE — 85610 PROTHROMBIN TIME: CPT

## 2019-09-10 ENCOUNTER — HOSPITAL ENCOUNTER (OUTPATIENT)
Dept: PHARMACY | Age: 84
Setting detail: THERAPIES SERIES
Discharge: HOME | End: 2019-09-10
Payer: MEDICARE

## 2019-09-10 VITALS
SYSTOLIC BLOOD PRESSURE: 131 MMHG | DIASTOLIC BLOOD PRESSURE: 68 MMHG | WEIGHT: 144.2 LBS | BODY MASS INDEX: 26.37 KG/M2 | HEART RATE: 60 BPM

## 2019-09-10 DIAGNOSIS — I35.0 AORTIC STENOSIS, SEVERE: ICD-10-CM

## 2019-09-10 DIAGNOSIS — Z86.718 HISTORY OF DVT OF LOWER EXTREMITY: ICD-10-CM

## 2019-09-10 DIAGNOSIS — Z79.01 LONG TERM CURRENT USE OF ANTICOAGULANT THERAPY: ICD-10-CM

## 2019-09-10 LAB — INR BLD: 2.1

## 2019-09-10 PROCEDURE — 85610 PROTHROMBIN TIME: CPT

## 2019-09-10 PROCEDURE — 99211 OFF/OP EST MAY X REQ PHY/QHP: CPT

## 2019-09-13 RX ORDER — LOSARTAN POTASSIUM 50 MG/1
TABLET ORAL
Qty: 180 TABLET | Refills: 3 | Status: SHIPPED | OUTPATIENT
Start: 2019-09-13 | End: 2019-09-18 | Stop reason: SDUPTHER

## 2019-09-18 RX ORDER — LOSARTAN POTASSIUM 50 MG/1
TABLET ORAL
Qty: 180 TABLET | Refills: 3 | Status: SHIPPED | OUTPATIENT
Start: 2019-09-18 | End: 2020-01-01 | Stop reason: SDUPTHER

## 2019-10-01 ENCOUNTER — HOSPITAL ENCOUNTER (OUTPATIENT)
Dept: PHARMACY | Age: 84
Setting detail: THERAPIES SERIES
Discharge: HOME | End: 2019-10-01
Payer: MEDICARE

## 2019-10-01 VITALS
DIASTOLIC BLOOD PRESSURE: 72 MMHG | WEIGHT: 145.6 LBS | BODY MASS INDEX: 26.63 KG/M2 | SYSTOLIC BLOOD PRESSURE: 133 MMHG | HEART RATE: 61 BPM

## 2019-10-01 DIAGNOSIS — Z79.01 LONG TERM CURRENT USE OF ANTICOAGULANT THERAPY: ICD-10-CM

## 2019-10-01 DIAGNOSIS — Z86.718 HISTORY OF DVT OF LOWER EXTREMITY: ICD-10-CM

## 2019-10-01 DIAGNOSIS — I35.0 AORTIC STENOSIS, SEVERE: ICD-10-CM

## 2019-10-01 LAB — INR BLD: 1.7

## 2019-10-01 PROCEDURE — 99211 OFF/OP EST MAY X REQ PHY/QHP: CPT

## 2019-10-01 PROCEDURE — 85610 PROTHROMBIN TIME: CPT

## 2019-10-04 ENCOUNTER — OFFICE VISIT (OUTPATIENT)
Dept: FAMILY MEDICINE CLINIC | Age: 84
End: 2019-10-04
Payer: MEDICARE

## 2019-10-04 VITALS
WEIGHT: 144 LBS | HEIGHT: 61 IN | HEART RATE: 60 BPM | OXYGEN SATURATION: 96 % | DIASTOLIC BLOOD PRESSURE: 80 MMHG | BODY MASS INDEX: 27.19 KG/M2 | SYSTOLIC BLOOD PRESSURE: 118 MMHG

## 2019-10-04 DIAGNOSIS — Z00.00 MEDICARE ANNUAL WELLNESS VISIT, INITIAL: Primary | ICD-10-CM

## 2019-10-04 PROCEDURE — G8484 FLU IMMUNIZE NO ADMIN: HCPCS | Performed by: FAMILY MEDICINE

## 2019-10-04 PROCEDURE — 4040F PNEUMOC VAC/ADMIN/RCVD: CPT | Performed by: FAMILY MEDICINE

## 2019-10-04 PROCEDURE — 1123F ACP DISCUSS/DSCN MKR DOCD: CPT | Performed by: FAMILY MEDICINE

## 2019-10-04 PROCEDURE — G0438 PPPS, INITIAL VISIT: HCPCS | Performed by: FAMILY MEDICINE

## 2019-10-04 ASSESSMENT — PATIENT HEALTH QUESTIONNAIRE - PHQ9
SUM OF ALL RESPONSES TO PHQ QUESTIONS 1-9: 2
SUM OF ALL RESPONSES TO PHQ QUESTIONS 1-9: 2

## 2019-10-04 ASSESSMENT — LIFESTYLE VARIABLES: HOW OFTEN DO YOU HAVE A DRINK CONTAINING ALCOHOL: 0

## 2019-10-14 DIAGNOSIS — I35.0 AORTIC STENOSIS, SEVERE: ICD-10-CM

## 2019-10-14 DIAGNOSIS — Z79.01 LONG TERM CURRENT USE OF ANTICOAGULANT THERAPY: ICD-10-CM

## 2019-10-14 RX ORDER — WARFARIN SODIUM 2 MG/1
TABLET ORAL
Qty: 180 TABLET | Refills: 3 | Status: SHIPPED | OUTPATIENT
Start: 2019-10-14 | End: 2019-10-15 | Stop reason: DRUGHIGH

## 2019-10-15 ENCOUNTER — HOSPITAL ENCOUNTER (OUTPATIENT)
Dept: PHARMACY | Age: 84
Setting detail: THERAPIES SERIES
Discharge: HOME | End: 2019-10-15
Payer: MEDICARE

## 2019-10-15 VITALS
HEART RATE: 58 BPM | DIASTOLIC BLOOD PRESSURE: 68 MMHG | SYSTOLIC BLOOD PRESSURE: 143 MMHG | BODY MASS INDEX: 27.36 KG/M2 | WEIGHT: 144.8 LBS

## 2019-10-15 DIAGNOSIS — Z79.01 LONG TERM CURRENT USE OF ANTICOAGULANT THERAPY: ICD-10-CM

## 2019-10-15 DIAGNOSIS — I35.0 AORTIC STENOSIS, SEVERE: ICD-10-CM

## 2019-10-15 DIAGNOSIS — Z86.718 HISTORY OF DVT OF LOWER EXTREMITY: ICD-10-CM

## 2019-10-15 LAB — INR BLD: 2.3

## 2019-10-15 PROCEDURE — 99211 OFF/OP EST MAY X REQ PHY/QHP: CPT

## 2019-10-15 PROCEDURE — 85610 PROTHROMBIN TIME: CPT

## 2019-10-15 RX ORDER — WARFARIN SODIUM 2 MG/1
TABLET ORAL
Qty: 180 TABLET | Refills: 3 | Status: SHIPPED
Start: 2019-10-15 | End: 2020-01-01 | Stop reason: SDUPTHER

## 2019-10-15 RX ORDER — WARFARIN SODIUM 5 MG/1
TABLET ORAL
Qty: 180 TABLET | Refills: 3 | Status: SHIPPED
Start: 2019-10-15 | End: 2020-01-01

## 2019-11-13 ENCOUNTER — OFFICE VISIT (OUTPATIENT)
Dept: FAMILY MEDICINE CLINIC | Age: 84
End: 2019-11-13
Payer: MEDICARE

## 2019-11-13 VITALS
WEIGHT: 145 LBS | BODY MASS INDEX: 27.4 KG/M2 | HEART RATE: 88 BPM | DIASTOLIC BLOOD PRESSURE: 60 MMHG | OXYGEN SATURATION: 96 % | SYSTOLIC BLOOD PRESSURE: 126 MMHG | TEMPERATURE: 97.9 F

## 2019-11-13 DIAGNOSIS — M54.32 SCIATICA OF LEFT SIDE: Primary | ICD-10-CM

## 2019-11-13 PROCEDURE — 99213 OFFICE O/P EST LOW 20 MIN: CPT | Performed by: FAMILY MEDICINE

## 2019-11-13 PROCEDURE — 1123F ACP DISCUSS/DSCN MKR DOCD: CPT | Performed by: FAMILY MEDICINE

## 2019-11-13 PROCEDURE — 1036F TOBACCO NON-USER: CPT | Performed by: FAMILY MEDICINE

## 2019-11-13 PROCEDURE — G8482 FLU IMMUNIZE ORDER/ADMIN: HCPCS | Performed by: FAMILY MEDICINE

## 2019-11-13 PROCEDURE — 4040F PNEUMOC VAC/ADMIN/RCVD: CPT | Performed by: FAMILY MEDICINE

## 2019-11-13 PROCEDURE — G8417 CALC BMI ABV UP PARAM F/U: HCPCS | Performed by: FAMILY MEDICINE

## 2019-11-13 PROCEDURE — G8427 DOCREV CUR MEDS BY ELIG CLIN: HCPCS | Performed by: FAMILY MEDICINE

## 2019-11-13 PROCEDURE — 1090F PRES/ABSN URINE INCON ASSESS: CPT | Performed by: FAMILY MEDICINE

## 2019-11-13 ASSESSMENT — ENCOUNTER SYMPTOMS
SORE THROAT: 0
ABDOMINAL PAIN: 0
SHORTNESS OF BREATH: 0
BACK PAIN: 0
COUGH: 0
RHINORRHEA: 0
CONSTIPATION: 0
VOMITING: 0
NAUSEA: 0

## 2019-11-19 ENCOUNTER — HOSPITAL ENCOUNTER (OUTPATIENT)
Dept: PHARMACY | Age: 84
Setting detail: THERAPIES SERIES
Discharge: HOME | End: 2019-11-19
Payer: MEDICARE

## 2019-11-19 VITALS
DIASTOLIC BLOOD PRESSURE: 69 MMHG | WEIGHT: 145.4 LBS | BODY MASS INDEX: 27.47 KG/M2 | SYSTOLIC BLOOD PRESSURE: 143 MMHG | HEART RATE: 62 BPM

## 2019-11-19 DIAGNOSIS — I35.0 AORTIC STENOSIS, SEVERE: ICD-10-CM

## 2019-11-19 DIAGNOSIS — Z79.01 LONG TERM CURRENT USE OF ANTICOAGULANT THERAPY: ICD-10-CM

## 2019-11-19 DIAGNOSIS — Z86.718 HISTORY OF DVT OF LOWER EXTREMITY: ICD-10-CM

## 2019-11-19 LAB — INR BLD: 2.6

## 2019-11-19 PROCEDURE — 99211 OFF/OP EST MAY X REQ PHY/QHP: CPT

## 2019-11-19 PROCEDURE — 85610 PROTHROMBIN TIME: CPT

## 2019-11-20 ENCOUNTER — OFFICE VISIT (OUTPATIENT)
Dept: PRIMARY CARE CLINIC | Age: 84
End: 2019-11-20
Payer: MEDICARE

## 2019-11-20 VITALS
WEIGHT: 145 LBS | OXYGEN SATURATION: 98 % | BODY MASS INDEX: 27.4 KG/M2 | SYSTOLIC BLOOD PRESSURE: 118 MMHG | HEART RATE: 64 BPM | DIASTOLIC BLOOD PRESSURE: 74 MMHG

## 2019-11-20 DIAGNOSIS — L90.0 LICHEN SCLEROSUS: Primary | ICD-10-CM

## 2019-11-20 DIAGNOSIS — Z86.19 HX OF COLD SORES: ICD-10-CM

## 2019-11-20 PROCEDURE — G8417 CALC BMI ABV UP PARAM F/U: HCPCS | Performed by: NURSE PRACTITIONER

## 2019-11-20 PROCEDURE — 4040F PNEUMOC VAC/ADMIN/RCVD: CPT | Performed by: NURSE PRACTITIONER

## 2019-11-20 PROCEDURE — 1090F PRES/ABSN URINE INCON ASSESS: CPT | Performed by: NURSE PRACTITIONER

## 2019-11-20 PROCEDURE — G8482 FLU IMMUNIZE ORDER/ADMIN: HCPCS | Performed by: NURSE PRACTITIONER

## 2019-11-20 PROCEDURE — 99213 OFFICE O/P EST LOW 20 MIN: CPT | Performed by: NURSE PRACTITIONER

## 2019-11-20 PROCEDURE — G8427 DOCREV CUR MEDS BY ELIG CLIN: HCPCS | Performed by: NURSE PRACTITIONER

## 2019-11-20 PROCEDURE — 1123F ACP DISCUSS/DSCN MKR DOCD: CPT | Performed by: NURSE PRACTITIONER

## 2019-11-20 PROCEDURE — 1036F TOBACCO NON-USER: CPT | Performed by: NURSE PRACTITIONER

## 2019-11-20 RX ORDER — CLOBETASOL PROPIONATE 0.5 MG/G
OINTMENT TOPICAL
Qty: 30 G | Refills: 1 | Status: SHIPPED | OUTPATIENT
Start: 2019-11-20

## 2019-11-20 RX ORDER — ACYCLOVIR 400 MG/1
400 TABLET ORAL 3 TIMES DAILY
Qty: 21 TABLET | Refills: 0 | Status: SHIPPED | OUTPATIENT
Start: 2019-11-20 | End: 2019-11-27

## 2019-11-20 ASSESSMENT — ENCOUNTER SYMPTOMS
ABDOMINAL DISTENTION: 0
COUGH: 0

## 2020-01-01 ENCOUNTER — TELEPHONE (OUTPATIENT)
Dept: PHARMACY | Age: 85
End: 2020-01-01

## 2020-01-01 ENCOUNTER — HOSPITAL ENCOUNTER (OUTPATIENT)
Dept: PHARMACY | Age: 85
Setting detail: THERAPIES SERIES
Discharge: HOME | End: 2020-02-25
Payer: MEDICARE

## 2020-01-01 ENCOUNTER — HOSPITAL ENCOUNTER (EMERGENCY)
Age: 85
Discharge: HOME | End: 2020-01-05
Attending: FAMILY MEDICINE
Payer: MEDICARE

## 2020-01-01 ENCOUNTER — OFFICE VISIT (OUTPATIENT)
Dept: ENT CLINIC | Age: 85
End: 2020-01-01
Payer: MEDICARE

## 2020-01-01 ENCOUNTER — TELEPHONE (OUTPATIENT)
Dept: PRIMARY CARE CLINIC | Age: 85
End: 2020-01-01

## 2020-01-01 ENCOUNTER — OFFICE VISIT (OUTPATIENT)
Dept: PRIMARY CARE CLINIC | Age: 85
End: 2020-01-01
Payer: MEDICARE

## 2020-01-01 ENCOUNTER — HOSPITAL ENCOUNTER (OUTPATIENT)
Age: 85
Discharge: HOME | End: 2020-07-02
Payer: MEDICARE

## 2020-01-01 ENCOUNTER — HOSPITAL ENCOUNTER (OUTPATIENT)
Age: 85
Discharge: HOME | End: 2020-06-30
Payer: MEDICARE

## 2020-01-01 ENCOUNTER — HOSPITAL ENCOUNTER (OUTPATIENT)
Dept: PHARMACY | Age: 85
Setting detail: THERAPIES SERIES
Discharge: HOME | End: 2020-09-15
Payer: MEDICARE

## 2020-01-01 ENCOUNTER — HOSPITAL ENCOUNTER (OUTPATIENT)
Dept: PHARMACY | Age: 85
Setting detail: THERAPIES SERIES
Discharge: HOME | End: 2020-01-21
Payer: MEDICARE

## 2020-01-01 ENCOUNTER — HOSPITAL ENCOUNTER (OUTPATIENT)
Dept: PHARMACY | Age: 85
Setting detail: THERAPIES SERIES
Discharge: HOME | End: 2020-04-27
Payer: MEDICARE

## 2020-01-01 ENCOUNTER — HOSPITAL ENCOUNTER (EMERGENCY)
Age: 85
Discharge: ANOTHER HOSPITAL | End: 2020-12-02
Attending: FAMILY MEDICINE
Payer: MEDICARE

## 2020-01-01 ENCOUNTER — TELEPHONE (OUTPATIENT)
Dept: FAMILY MEDICINE CLINIC | Age: 85
End: 2020-01-01

## 2020-01-01 ENCOUNTER — HOSPITAL ENCOUNTER (OUTPATIENT)
Dept: PHARMACY | Age: 85
Setting detail: THERAPIES SERIES
Discharge: HOME | End: 2020-10-20
Payer: MEDICARE

## 2020-01-01 ENCOUNTER — HOSPITAL ENCOUNTER (OUTPATIENT)
Dept: MRI IMAGING | Age: 85
Discharge: HOME | End: 2020-09-18
Payer: MEDICARE

## 2020-01-01 ENCOUNTER — HOSPITAL ENCOUNTER (OUTPATIENT)
Dept: NON INVASIVE DIAGNOSTICS | Age: 85
Discharge: HOME | End: 2020-06-30
Payer: MEDICARE

## 2020-01-01 ENCOUNTER — ANTI-COAG VISIT (OUTPATIENT)
Dept: PHARMACY | Age: 85
End: 2020-01-01

## 2020-01-01 ENCOUNTER — HOSPITAL ENCOUNTER (INPATIENT)
Age: 85
LOS: 6 days | Discharge: HOSPICE | DRG: 177 | End: 2020-12-08
Attending: INTERNAL MEDICINE | Admitting: INTERNAL MEDICINE
Payer: MEDICARE

## 2020-01-01 ENCOUNTER — OFFICE VISIT (OUTPATIENT)
Dept: CARDIOLOGY CLINIC | Age: 85
End: 2020-01-01
Payer: MEDICARE

## 2020-01-01 ENCOUNTER — OFFICE VISIT (OUTPATIENT)
Dept: FAMILY MEDICINE CLINIC | Age: 85
End: 2020-01-01
Payer: MEDICARE

## 2020-01-01 ENCOUNTER — HOSPITAL ENCOUNTER (OUTPATIENT)
Age: 85
Setting detail: SPECIMEN
Discharge: HOME | End: 2020-09-02
Payer: MEDICARE

## 2020-01-01 ENCOUNTER — APPOINTMENT (OUTPATIENT)
Dept: GENERAL RADIOLOGY | Age: 85
End: 2020-01-01
Payer: MEDICARE

## 2020-01-01 ENCOUNTER — HOSPITAL ENCOUNTER (OUTPATIENT)
Dept: GENERAL RADIOLOGY | Age: 85
Discharge: HOME | End: 2020-07-02
Payer: MEDICARE

## 2020-01-01 ENCOUNTER — HOSPITAL ENCOUNTER (EMERGENCY)
Age: 85
Discharge: HOME | End: 2020-11-27
Attending: FAMILY MEDICINE
Payer: MEDICARE

## 2020-01-01 ENCOUNTER — HOSPITAL ENCOUNTER (OUTPATIENT)
Dept: PHARMACY | Age: 85
Setting detail: THERAPIES SERIES
Discharge: HOME | End: 2020-08-04
Payer: MEDICARE

## 2020-01-01 ENCOUNTER — APPOINTMENT (OUTPATIENT)
Dept: CT IMAGING | Age: 85
End: 2020-01-01
Payer: MEDICARE

## 2020-01-01 ENCOUNTER — HOSPITAL ENCOUNTER (OUTPATIENT)
Dept: PHARMACY | Age: 85
Setting detail: THERAPIES SERIES
Discharge: HOME | End: 2020-06-29
Payer: MEDICARE

## 2020-01-01 ENCOUNTER — HOSPITAL ENCOUNTER (OUTPATIENT)
Dept: PHARMACY | Age: 85
Setting detail: THERAPIES SERIES
Discharge: HOME | End: 2020-05-26
Payer: MEDICARE

## 2020-01-01 ENCOUNTER — HOSPITAL ENCOUNTER (OUTPATIENT)
Dept: VASCULAR LAB | Age: 85
Discharge: HOME | End: 2020-09-18
Payer: MEDICARE

## 2020-01-01 ENCOUNTER — HOSPITAL ENCOUNTER (OUTPATIENT)
Age: 85
Setting detail: SPECIMEN
Discharge: HOME | End: 2020-03-16
Payer: MEDICARE

## 2020-01-01 VITALS
SYSTOLIC BLOOD PRESSURE: 156 MMHG | WEIGHT: 144.2 LBS | HEART RATE: 64 BPM | DIASTOLIC BLOOD PRESSURE: 72 MMHG | BODY MASS INDEX: 26.37 KG/M2

## 2020-01-01 VITALS
WEIGHT: 145 LBS | TEMPERATURE: 97.4 F | RESPIRATION RATE: 18 BRPM | BODY MASS INDEX: 26.68 KG/M2 | HEIGHT: 62 IN | HEART RATE: 60 BPM | DIASTOLIC BLOOD PRESSURE: 68 MMHG | OXYGEN SATURATION: 93 % | SYSTOLIC BLOOD PRESSURE: 139 MMHG

## 2020-01-01 VITALS
DIASTOLIC BLOOD PRESSURE: 65 MMHG | BODY MASS INDEX: 25.35 KG/M2 | HEART RATE: 58 BPM | WEIGHT: 138.6 LBS | SYSTOLIC BLOOD PRESSURE: 139 MMHG

## 2020-01-01 VITALS
TEMPERATURE: 97.8 F | HEIGHT: 62 IN | WEIGHT: 144 LBS | BODY MASS INDEX: 26.5 KG/M2 | HEART RATE: 68 BPM | DIASTOLIC BLOOD PRESSURE: 64 MMHG | SYSTOLIC BLOOD PRESSURE: 122 MMHG | RESPIRATION RATE: 18 BRPM | OXYGEN SATURATION: 94 %

## 2020-01-01 VITALS
WEIGHT: 142 LBS | OXYGEN SATURATION: 90 % | HEART RATE: 56 BPM | TEMPERATURE: 97.6 F | DIASTOLIC BLOOD PRESSURE: 59 MMHG | BODY MASS INDEX: 26.13 KG/M2 | RESPIRATION RATE: 20 BRPM | HEIGHT: 62 IN | SYSTOLIC BLOOD PRESSURE: 120 MMHG

## 2020-01-01 VITALS
BODY MASS INDEX: 26.68 KG/M2 | OXYGEN SATURATION: 96 % | SYSTOLIC BLOOD PRESSURE: 152 MMHG | HEIGHT: 62 IN | RESPIRATION RATE: 20 BRPM | DIASTOLIC BLOOD PRESSURE: 73 MMHG | HEART RATE: 72 BPM | TEMPERATURE: 97.6 F | WEIGHT: 145 LBS

## 2020-01-01 VITALS
BODY MASS INDEX: 26.16 KG/M2 | DIASTOLIC BLOOD PRESSURE: 90 MMHG | TEMPERATURE: 97.8 F | HEART RATE: 64 BPM | SYSTOLIC BLOOD PRESSURE: 152 MMHG | WEIGHT: 143 LBS | OXYGEN SATURATION: 98 %

## 2020-01-01 VITALS
HEART RATE: 72 BPM | SYSTOLIC BLOOD PRESSURE: 118 MMHG | WEIGHT: 142 LBS | BODY MASS INDEX: 25.97 KG/M2 | OXYGEN SATURATION: 94 % | DIASTOLIC BLOOD PRESSURE: 50 MMHG

## 2020-01-01 VITALS
OXYGEN SATURATION: 93 % | DIASTOLIC BLOOD PRESSURE: 70 MMHG | BODY MASS INDEX: 25.79 KG/M2 | HEART RATE: 61 BPM | WEIGHT: 141 LBS | SYSTOLIC BLOOD PRESSURE: 120 MMHG | TEMPERATURE: 97.8 F

## 2020-01-01 VITALS
RESPIRATION RATE: 18 BRPM | TEMPERATURE: 97.5 F | DIASTOLIC BLOOD PRESSURE: 72 MMHG | HEIGHT: 62 IN | HEART RATE: 72 BPM | BODY MASS INDEX: 26.31 KG/M2 | OXYGEN SATURATION: 94 % | WEIGHT: 143 LBS | SYSTOLIC BLOOD PRESSURE: 124 MMHG

## 2020-01-01 VITALS
SYSTOLIC BLOOD PRESSURE: 100 MMHG | BODY MASS INDEX: 25.61 KG/M2 | HEIGHT: 62 IN | DIASTOLIC BLOOD PRESSURE: 50 MMHG | HEART RATE: 56 BPM

## 2020-01-01 VITALS — TEMPERATURE: 97.7 F

## 2020-01-01 VITALS — TEMPERATURE: 97.8 F

## 2020-01-01 VITALS
BODY MASS INDEX: 25.79 KG/M2 | DIASTOLIC BLOOD PRESSURE: 70 MMHG | SYSTOLIC BLOOD PRESSURE: 160 MMHG | HEART RATE: 68 BPM | OXYGEN SATURATION: 93 % | WEIGHT: 141 LBS

## 2020-01-01 VITALS — TEMPERATURE: 99.1 F

## 2020-01-01 VITALS
HEART RATE: 95 BPM | TEMPERATURE: 97.6 F | DIASTOLIC BLOOD PRESSURE: 79 MMHG | BODY MASS INDEX: 24.8 KG/M2 | HEIGHT: 62 IN | RESPIRATION RATE: 19 BRPM | SYSTOLIC BLOOD PRESSURE: 127 MMHG | WEIGHT: 134.8 LBS | OXYGEN SATURATION: 91 %

## 2020-01-01 VITALS
WEIGHT: 125.6 LBS | DIASTOLIC BLOOD PRESSURE: 80 MMHG | HEART RATE: 124 BPM | RESPIRATION RATE: 18 BRPM | SYSTOLIC BLOOD PRESSURE: 137 MMHG | TEMPERATURE: 97.3 F | HEIGHT: 62 IN | BODY MASS INDEX: 23.11 KG/M2 | OXYGEN SATURATION: 93 %

## 2020-01-01 VITALS — TEMPERATURE: 97.5 F

## 2020-01-01 VITALS
BODY MASS INDEX: 26.81 KG/M2 | HEART RATE: 61 BPM | WEIGHT: 146.6 LBS | DIASTOLIC BLOOD PRESSURE: 59 MMHG | SYSTOLIC BLOOD PRESSURE: 148 MMHG

## 2020-01-01 VITALS
HEART RATE: 64 BPM | DIASTOLIC BLOOD PRESSURE: 49 MMHG | OXYGEN SATURATION: 92 % | BODY MASS INDEX: 25.61 KG/M2 | WEIGHT: 140 LBS | TEMPERATURE: 97 F | SYSTOLIC BLOOD PRESSURE: 118 MMHG | RESPIRATION RATE: 16 BRPM

## 2020-01-01 LAB
-: ABNORMAL
-: ABNORMAL
ABSOLUTE EOS #: 0 K/UL (ref 0–0.4)
ABSOLUTE EOS #: 0 K/UL (ref 0–0.4)
ABSOLUTE EOS #: 0 K/UL (ref 0–0.44)
ABSOLUTE EOS #: 0.04 K/UL (ref 0–0.44)
ABSOLUTE EOS #: 0.1 K/UL (ref 0–0.4)
ABSOLUTE EOS #: 0.2 K/UL (ref 0–0.4)
ABSOLUTE IMMATURE GRANULOCYTE: 0 K/UL (ref 0–0.3)
ABSOLUTE IMMATURE GRANULOCYTE: 0 K/UL (ref 0–0.3)
ABSOLUTE IMMATURE GRANULOCYTE: 0.08 K/UL (ref 0–0.3)
ABSOLUTE IMMATURE GRANULOCYTE: 0.25 K/UL (ref 0–0.3)
ABSOLUTE IMMATURE GRANULOCYTE: ABNORMAL K/UL (ref 0–0.3)
ABSOLUTE LYMPH #: 0.38 K/UL (ref 1.1–3.7)
ABSOLUTE LYMPH #: 0.46 K/UL (ref 1.1–3.7)
ABSOLUTE LYMPH #: 0.6 K/UL (ref 1–4.8)
ABSOLUTE LYMPH #: 0.65 K/UL (ref 1.1–3.7)
ABSOLUTE LYMPH #: 0.79 K/UL (ref 1.1–3.7)
ABSOLUTE LYMPH #: 0.8 K/UL (ref 1–4.8)
ABSOLUTE LYMPH #: 1.3 K/UL (ref 1–4.8)
ABSOLUTE LYMPH #: 1.4 K/UL (ref 1–4.8)
ABSOLUTE MONO #: 0 K/UL (ref 0.1–1.2)
ABSOLUTE MONO #: 0.24 K/UL (ref 0.1–1.2)
ABSOLUTE MONO #: 0.33 K/UL (ref 0.1–1.2)
ABSOLUTE MONO #: 0.4 K/UL (ref 0–1)
ABSOLUTE MONO #: 0.5 K/UL (ref 0–1)
ABSOLUTE MONO #: 0.6 K/UL (ref 0–1)
ABSOLUTE MONO #: 0.6 K/UL (ref 0–1)
ABSOLUTE MONO #: 0.63 K/UL (ref 0.1–1.2)
ALBUMIN SERPL-MCNC: 3.2 G/DL (ref 3.5–5.2)
ALBUMIN SERPL-MCNC: 3.3 G/DL (ref 3.5–5.2)
ALBUMIN SERPL-MCNC: 3.4 G/DL (ref 3.5–5.2)
ALBUMIN SERPL-MCNC: 3.6 G/DL (ref 3.5–5.2)
ALBUMIN SERPL-MCNC: 3.7 G/DL (ref 3.5–5.2)
ALBUMIN SERPL-MCNC: 4.2 G/DL (ref 3.5–5.2)
ALBUMIN SERPL-MCNC: 4.3 G/DL (ref 3.5–5.2)
ALBUMIN SERPL-MCNC: 4.7 G/DL (ref 3.5–5.2)
ALBUMIN/GLOBULIN RATIO: 0.8 (ref 1–2.5)
ALBUMIN/GLOBULIN RATIO: 1 (ref 1–2.5)
ALBUMIN/GLOBULIN RATIO: 1.2 (ref 1–2.5)
ALBUMIN/GLOBULIN RATIO: 1.2 (ref 1–2.5)
ALBUMIN/GLOBULIN RATIO: ABNORMAL (ref 1–2.5)
ALLEN TEST: ABNORMAL
ALP BLD-CCNC: 70 U/L (ref 35–104)
ALP BLD-CCNC: 70 U/L (ref 35–104)
ALP BLD-CCNC: 73 U/L (ref 35–104)
ALP BLD-CCNC: 75 U/L (ref 35–104)
ALP BLD-CCNC: 79 U/L (ref 35–104)
ALP BLD-CCNC: 81 U/L (ref 35–104)
ALP BLD-CCNC: 88 U/L (ref 35–104)
ALP BLD-CCNC: 89 U/L (ref 35–104)
ALT SERPL-CCNC: 10 U/L (ref 5–33)
ALT SERPL-CCNC: 10 U/L (ref 5–33)
ALT SERPL-CCNC: 11 U/L (ref 5–33)
ALT SERPL-CCNC: 13 U/L (ref 5–33)
ALT SERPL-CCNC: 13 U/L (ref 5–33)
ALT SERPL-CCNC: 19 U/L (ref 5–33)
ALT SERPL-CCNC: 9 U/L (ref 5–33)
ALT SERPL-CCNC: 9 U/L (ref 5–33)
AMORPHOUS: ABNORMAL
AMORPHOUS: ABNORMAL
ANION GAP SERPL CALCULATED.3IONS-SCNC: 10 MMOL/L (ref 9–17)
ANION GAP SERPL CALCULATED.3IONS-SCNC: 11 MMOL/L (ref 9–17)
ANION GAP SERPL CALCULATED.3IONS-SCNC: 11 MMOL/L (ref 9–17)
ANION GAP SERPL CALCULATED.3IONS-SCNC: 13 MMOL/L (ref 9–17)
ANION GAP SERPL CALCULATED.3IONS-SCNC: 14 MMOL/L (ref 9–17)
ANION GAP SERPL CALCULATED.3IONS-SCNC: 15 MMOL/L (ref 9–17)
ANION GAP SERPL CALCULATED.3IONS-SCNC: 17 MMOL/L (ref 9–17)
ANION GAP SERPL CALCULATED.3IONS-SCNC: 18 MMOL/L (ref 9–17)
APPEARANCE FLUID: ABNORMAL
AST SERPL-CCNC: 19 U/L
AST SERPL-CCNC: 20 U/L
AST SERPL-CCNC: 27 U/L
AST SERPL-CCNC: 32 U/L
AST SERPL-CCNC: 33 U/L
AST SERPL-CCNC: 36 U/L
AST SERPL-CCNC: 41 U/L
AST SERPL-CCNC: 51 U/L
BACTERIA: ABNORMAL
BACTERIA: ABNORMAL
BASOPHILS # BLD: 0 % (ref 0–2)
BASOPHILS # BLD: 1 % (ref 0–2)
BASOPHILS # BLD: 1 % (ref 0–2)
BASOPHILS ABSOLUTE: 0 K/UL (ref 0–0.2)
BILIRUB SERPL-MCNC: 0.4 MG/DL (ref 0.3–1.2)
BILIRUB SERPL-MCNC: 0.59 MG/DL (ref 0.3–1.2)
BILIRUB SERPL-MCNC: 0.64 MG/DL (ref 0.3–1.2)
BILIRUB SERPL-MCNC: 0.68 MG/DL (ref 0.3–1.2)
BILIRUB SERPL-MCNC: 0.71 MG/DL (ref 0.3–1.2)
BILIRUB SERPL-MCNC: 0.75 MG/DL (ref 0.3–1.2)
BILIRUB SERPL-MCNC: 1.25 MG/DL (ref 0.3–1.2)
BILIRUB SERPL-MCNC: 1.91 MG/DL (ref 0.3–1.2)
BILIRUBIN URINE: ABNORMAL
BILIRUBIN URINE: NEGATIVE
BILIRUBIN, POC: ABNORMAL
BILIRUBIN, POC: NEGATIVE
BLOOD URINE, POC: ABNORMAL
BLOOD URINE, POC: NEGATIVE
BUN BLDV-MCNC: 17 MG/DL (ref 8–23)
BUN BLDV-MCNC: 19 MG/DL (ref 8–23)
BUN BLDV-MCNC: 23 MG/DL (ref 8–23)
BUN BLDV-MCNC: 28 MG/DL (ref 8–23)
BUN BLDV-MCNC: 29 MG/DL (ref 8–23)
BUN BLDV-MCNC: 30 MG/DL (ref 8–23)
BUN BLDV-MCNC: 36 MG/DL (ref 8–23)
BUN BLDV-MCNC: 54 MG/DL (ref 8–23)
BUN/CREAT BLD: 17 (ref 9–20)
BUN/CREAT BLD: 20 (ref 9–20)
BUN/CREAT BLD: 27 (ref 9–20)
BUN/CREAT BLD: 39 (ref 9–20)
BUN/CREAT BLD: 44 (ref 9–20)
BUN/CREAT BLD: 47 (ref 9–20)
BUN/CREAT BLD: ABNORMAL (ref 9–20)
BUN/CREAT BLD: ABNORMAL (ref 9–20)
CALCIUM SERPL-MCNC: 10 MG/DL (ref 8.6–10.4)
CALCIUM SERPL-MCNC: 10.4 MG/DL (ref 8.6–10.4)
CALCIUM SERPL-MCNC: 10.5 MG/DL (ref 8.6–10.4)
CALCIUM SERPL-MCNC: 8.9 MG/DL (ref 8.6–10.4)
CALCIUM SERPL-MCNC: 9.1 MG/DL (ref 8.6–10.4)
CALCIUM SERPL-MCNC: 9.2 MG/DL (ref 8.6–10.4)
CALCIUM SERPL-MCNC: 9.6 MG/DL (ref 8.6–10.4)
CALCIUM SERPL-MCNC: 9.8 MG/DL (ref 8.6–10.4)
CARBOXYHEMOGLOBIN: ABNORMAL % (ref 0–5)
CASTS UA: ABNORMAL /LPF
CASTS UA: ABNORMAL /LPF
CHLORIDE BLD-SCNC: 101 MMOL/L (ref 98–107)
CHLORIDE BLD-SCNC: 102 MMOL/L (ref 98–107)
CHLORIDE BLD-SCNC: 103 MMOL/L (ref 98–107)
CHLORIDE BLD-SCNC: 104 MMOL/L (ref 98–107)
CHLORIDE BLD-SCNC: 105 MMOL/L (ref 98–107)
CHLORIDE BLD-SCNC: 108 MMOL/L (ref 98–107)
CHLORIDE BLD-SCNC: 110 MMOL/L (ref 98–107)
CHLORIDE BLD-SCNC: 93 MMOL/L (ref 98–107)
CHOLESTEROL/HDL RATIO: 1.8
CHOLESTEROL: 152 MG/DL
CLARITY, POC: ABNORMAL
CLARITY, POC: CLEAR
CO2: 17 MMOL/L (ref 20–31)
CO2: 18 MMOL/L (ref 20–31)
CO2: 23 MMOL/L (ref 20–31)
CO2: 26 MMOL/L (ref 20–31)
CO2: 27 MMOL/L (ref 20–31)
COLOR, POC: ABNORMAL
COLOR, POC: YELLOW
COLOR: YELLOW
COLOR: YELLOW
COMMENT UA: ABNORMAL
COMMENT UA: ABNORMAL
CREAT SERPL-MCNC: 0.71 MG/DL (ref 0.5–0.9)
CREAT SERPL-MCNC: 0.81 MG/DL (ref 0.5–0.9)
CREAT SERPL-MCNC: 0.84 MG/DL (ref 0.5–0.9)
CREAT SERPL-MCNC: 0.96 MG/DL (ref 0.5–0.9)
CREAT SERPL-MCNC: 1.01 MG/DL (ref 0.5–0.9)
CREAT SERPL-MCNC: 1.15 MG/DL (ref 0.5–0.9)
CREAT SERPL-MCNC: 1.22 MG/DL (ref 0.5–0.9)
CREAT SERPL-MCNC: 2.09 MG/DL (ref 0.5–0.9)
CRYSTALS, UA: ABNORMAL /HPF
CRYSTALS, UA: ABNORMAL /HPF
CULTURE: ABNORMAL
CULTURE: NO GROWTH
CULTURE: NORMAL
D-DIMER QUANTITATIVE: 1.83 MG/L FEU (ref 0–0.59)
D-DIMER QUANTITATIVE: 3.65 MG/L FEU (ref 0–0.59)
D-DIMER QUANTITATIVE: >20 MG/L FEU (ref 0–0.59)
DIFFERENTIAL TYPE: ABNORMAL
DIFFERENTIAL TYPE: YES
EKG ATRIAL RATE: 59 BPM
EKG ATRIAL RATE: 62 BPM
EKG ATRIAL RATE: 67 BPM
EKG P AXIS: 85 DEGREES
EKG P AXIS: 85 DEGREES
EKG P AXIS: 91 DEGREES
EKG P-R INTERVAL: 214 MS
EKG P-R INTERVAL: 228 MS
EKG P-R INTERVAL: 244 MS
EKG Q-T INTERVAL: 478 MS
EKG Q-T INTERVAL: 492 MS
EKG Q-T INTERVAL: 512 MS
EKG QRS DURATION: 142 MS
EKG QRS DURATION: 144 MS
EKG QRS DURATION: 148 MS
EKG QTC CALCULATION (BAZETT): 485 MS
EKG QTC CALCULATION (BAZETT): 506 MS
EKG QTC CALCULATION (BAZETT): 519 MS
EKG R AXIS: 35 DEGREES
EKG R AXIS: 52 DEGREES
EKG R AXIS: 55 DEGREES
EKG T AXIS: 27 DEGREES
EKG T AXIS: 44 DEGREES
EKG T AXIS: 45 DEGREES
EKG VENTRICULAR RATE: 59 BPM
EKG VENTRICULAR RATE: 62 BPM
EKG VENTRICULAR RATE: 67 BPM
EOSINOPHILS RELATIVE PERCENT: 0 % (ref 0–5)
EOSINOPHILS RELATIVE PERCENT: 0 % (ref 0–5)
EOSINOPHILS RELATIVE PERCENT: 0 % (ref 1–4)
EOSINOPHILS RELATIVE PERCENT: 1 % (ref 1–4)
EOSINOPHILS RELATIVE PERCENT: 2 % (ref 0–5)
EOSINOPHILS RELATIVE PERCENT: 2 % (ref 0–5)
EPITHELIAL CELLS UA: ABNORMAL /HPF
EPITHELIAL CELLS UA: ABNORMAL /HPF (ref 0–25)
FERRITIN: 1198 UG/L (ref 13–150)
FERRITIN: 688 UG/L (ref 13–150)
FERRITIN: 698 UG/L (ref 13–150)
FERRITIN: 773 UG/L (ref 13–150)
FERRITIN: 836 UG/L (ref 13–150)
FIBRINOGEN: 181 MG/DL (ref 179–518)
FIBRINOGEN: 466 MG/DL (ref 179–518)
FIBRINOGEN: 521 MG/DL (ref 179–518)
FIBRINOGEN: 615 MG/DL (ref 179–518)
FIBRINOGEN: <60 MG/DL (ref 179–518)
FIO2: ABNORMAL
GFR AFRICAN AMERICAN: 27 ML/MIN
GFR AFRICAN AMERICAN: 50 ML/MIN
GFR AFRICAN AMERICAN: 54 ML/MIN
GFR AFRICAN AMERICAN: >60 ML/MIN
GFR NON-AFRICAN AMERICAN: 22 ML/MIN
GFR NON-AFRICAN AMERICAN: 42 ML/MIN
GFR NON-AFRICAN AMERICAN: 44 ML/MIN
GFR NON-AFRICAN AMERICAN: 52 ML/MIN
GFR NON-AFRICAN AMERICAN: 55 ML/MIN
GFR NON-AFRICAN AMERICAN: >60 ML/MIN
GFR SERPL CREATININE-BSD FRML MDRD: ABNORMAL ML/MIN/{1.73_M2}
GLUCOSE BLD-MCNC: 105 MG/DL (ref 70–99)
GLUCOSE BLD-MCNC: 117 MG/DL (ref 70–99)
GLUCOSE BLD-MCNC: 126 MG/DL (ref 70–99)
GLUCOSE BLD-MCNC: 141 MG/DL (ref 70–99)
GLUCOSE BLD-MCNC: 143 MG/DL (ref 70–99)
GLUCOSE BLD-MCNC: 146 MG/DL (ref 70–99)
GLUCOSE BLD-MCNC: 215 MG/DL (ref 70–99)
GLUCOSE BLD-MCNC: 223 MG/DL (ref 70–99)
GLUCOSE URINE, POC: ABNORMAL
GLUCOSE URINE, POC: NEGATIVE
GLUCOSE URINE: NEGATIVE
GLUCOSE URINE: NEGATIVE
HCO3 ARTERIAL: 20.9 MMOL/L (ref 22–26)
HCO3 ARTERIAL: 22.4 MMOL/L (ref 22–26)
HCO3 ARTERIAL: 23.5 MMOL/L (ref 22–26)
HCT VFR BLD CALC: 33.1 % (ref 36.3–47.1)
HCT VFR BLD CALC: 34.9 % (ref 36.3–47.1)
HCT VFR BLD CALC: 34.9 % (ref 36.3–47.1)
HCT VFR BLD CALC: 35.7 % (ref 36–46)
HCT VFR BLD CALC: 35.8 % (ref 36–46)
HCT VFR BLD CALC: 36 % (ref 36.3–47.1)
HCT VFR BLD CALC: 36.5 % (ref 36–46)
HCT VFR BLD CALC: 37 % (ref 36–46)
HDLC SERPL-MCNC: 83 MG/DL
HEMOGLOBIN: 10.4 G/DL (ref 11.9–15.1)
HEMOGLOBIN: 11 G/DL (ref 11.9–15.1)
HEMOGLOBIN: 11 G/DL (ref 11.9–15.1)
HEMOGLOBIN: 11.6 G/DL (ref 11.9–15.1)
HEMOGLOBIN: 11.7 G/DL (ref 12–16)
HEMOGLOBIN: 12.1 G/DL (ref 12–16)
HEMOGLOBIN: 12.2 G/DL (ref 12–16)
HEMOGLOBIN: 12.4 G/DL (ref 12–16)
IMMATURE GRANULOCYTES: 0 %
IMMATURE GRANULOCYTES: 0 %
IMMATURE GRANULOCYTES: 1 %
IMMATURE GRANULOCYTES: 2 %
IMMATURE GRANULOCYTES: ABNORMAL %
INR BLD: 1.8
INR BLD: 1.9
INR BLD: 2
INR BLD: 2.2
INR BLD: 2.2
INR BLD: 2.3
INR BLD: 2.4
INR BLD: 2.6
INR BLD: 2.7
INR BLD: 2.9
INR BLD: 4
INR BLD: 4.3
INR BLD: 4.7
INR BLD: 6.3
KETONES, POC: ABNORMAL
KETONES, POC: NEGATIVE
KETONES, URINE: ABNORMAL
KETONES, URINE: ABNORMAL
LACTATE DEHYDROGENASE: 500 U/L (ref 135–214)
LACTIC ACID, WHOLE BLOOD: NORMAL MMOL/L (ref 0.7–2.1)
LACTIC ACID: 1.5 MMOL/L (ref 0.5–2.2)
LDL CHOLESTEROL: 55 MG/DL (ref 0–130)
LEUKOCYTE EST, POC: ABNORMAL
LEUKOCYTE EST, POC: ABNORMAL
LEUKOCYTE ESTERASE, URINE: ABNORMAL
LEUKOCYTE ESTERASE, URINE: NEGATIVE
LV EF: 60 %
LVEF MODALITY: NORMAL
LYMPHOCYTES # BLD: 10 % (ref 15–40)
LYMPHOCYTES # BLD: 12 % (ref 24–43)
LYMPHOCYTES # BLD: 12 % (ref 24–43)
LYMPHOCYTES # BLD: 19 % (ref 15–40)
LYMPHOCYTES # BLD: 20 % (ref 15–40)
LYMPHOCYTES # BLD: 20 % (ref 15–40)
LYMPHOCYTES # BLD: 3 % (ref 24–43)
LYMPHOCYTES # BLD: 8 % (ref 24–43)
Lab: ABNORMAL
Lab: NORMAL
Lab: NORMAL
MAGNESIUM: 1.7 MG/DL (ref 1.6–2.6)
MAGNESIUM: 1.7 MG/DL (ref 1.6–2.6)
MAGNESIUM: 1.8 MG/DL (ref 1.6–2.6)
MCH RBC QN AUTO: 28 PG (ref 25.2–33.5)
MCH RBC QN AUTO: 28.1 PG (ref 25.2–33.5)
MCH RBC QN AUTO: 28.1 PG (ref 25.2–33.5)
MCH RBC QN AUTO: 28.3 PG (ref 25.2–33.5)
MCH RBC QN AUTO: 28.7 PG (ref 26–34)
MCH RBC QN AUTO: 29.1 PG (ref 26–34)
MCH RBC QN AUTO: 30.4 PG (ref 26–34)
MCH RBC QN AUTO: 30.6 PG (ref 26–34)
MCHC RBC AUTO-ENTMCNC: 31.4 G/DL (ref 28.4–34.8)
MCHC RBC AUTO-ENTMCNC: 31.5 G/DL (ref 28.4–34.8)
MCHC RBC AUTO-ENTMCNC: 31.5 G/DL (ref 28.4–34.8)
MCHC RBC AUTO-ENTMCNC: 32.2 G/DL (ref 28.4–34.8)
MCHC RBC AUTO-ENTMCNC: 32.8 G/DL (ref 31–37)
MCHC RBC AUTO-ENTMCNC: 33 G/DL (ref 31–37)
MCHC RBC AUTO-ENTMCNC: 33.5 G/DL (ref 31–37)
MCHC RBC AUTO-ENTMCNC: 34 G/DL (ref 31–37)
MCV RBC AUTO: 86.9 FL (ref 80–100)
MCV RBC AUTO: 86.9 FL (ref 80–100)
MCV RBC AUTO: 87.8 FL (ref 82.6–102.9)
MCV RBC AUTO: 89 FL (ref 82.6–102.9)
MCV RBC AUTO: 89.2 FL (ref 82.6–102.9)
MCV RBC AUTO: 89.3 FL (ref 82.6–102.9)
MCV RBC AUTO: 89.5 FL (ref 80–100)
MCV RBC AUTO: 93.3 FL (ref 80–100)
METHEMOGLOBIN: ABNORMAL % (ref 0–1.9)
MODE: ABNORMAL
MONOCYTES # BLD: 0 % (ref 3–12)
MONOCYTES # BLD: 10 % (ref 4–8)
MONOCYTES # BLD: 3 % (ref 3–12)
MONOCYTES # BLD: 5 % (ref 3–12)
MONOCYTES # BLD: 5 % (ref 3–12)
MONOCYTES # BLD: 7 % (ref 4–8)
MONOCYTES # BLD: 8 % (ref 4–8)
MONOCYTES # BLD: 9 % (ref 4–8)
MORPHOLOGY: NORMAL
MUCUS: ABNORMAL
MUCUS: ABNORMAL
NEGATIVE BASE EXCESS, ART: 2.3 MMOL/L (ref 0–2)
NEGATIVE BASE EXCESS, ART: 2.5 MMOL/L (ref 0–2)
NEGATIVE BASE EXCESS, ART: ABNORMAL MMOL/L (ref 0–2)
NITRITE, POC: ABNORMAL
NITRITE, POC: NEGATIVE
NITRITE, URINE: NEGATIVE
NITRITE, URINE: NEGATIVE
NOTIFICATION TIME: ABNORMAL
NOTIFICATION: ABNORMAL
NRBC AUTOMATED: 0 PER 100 WBC
NRBC AUTOMATED: ABNORMAL PER 100 WBC
O2 DEVICE/FLOW/%: ABNORMAL
O2 SAT, ARTERIAL: 94.6 % (ref 95–98)
O2 SAT, ARTERIAL: 94.8 % (ref 95–98)
O2 SAT, ARTERIAL: 95 % (ref 95–98)
OTHER OBSERVATIONS UA: ABNORMAL
OTHER OBSERVATIONS UA: ABNORMAL
OXYHEMOGLOBIN: ABNORMAL % (ref 95–98)
PARTIAL THROMBOPLASTIN TIME: 32.6 SEC (ref 21–33)
PARTIAL THROMBOPLASTIN TIME: 33.3 SEC (ref 23.9–33.8)
PARTIAL THROMBOPLASTIN TIME: 42.3 SEC (ref 23.9–33.8)
PARTIAL THROMBOPLASTIN TIME: 56.1 SEC (ref 23.9–33.8)
PARTIAL THROMBOPLASTIN TIME: 65 SEC (ref 23.9–33.8)
PARTIAL THROMBOPLASTIN TIME: 65.3 SEC (ref 23.9–33.8)
PATIENT FASTING?: YES
PATIENT TEMP: ABNORMAL
PCO2 ARTERIAL: 32.2 MMHG (ref 35–45)
PCO2 ARTERIAL: 34.4 MMHG (ref 35–45)
PCO2 ARTERIAL: 39.2 MMHG (ref 35–45)
PCO2, ART, TEMP ADJ: ABNORMAL
PDW BLD-RTO: 15.6 % (ref 11.8–14.4)
PDW BLD-RTO: 15.7 % (ref 11.8–14.4)
PDW BLD-RTO: 15.7 % (ref 11.8–14.4)
PDW BLD-RTO: 15.8 % (ref 11.8–14.4)
PDW BLD-RTO: 16.1 % (ref 12.1–15.2)
PDW BLD-RTO: 16.4 % (ref 12.1–15.2)
PDW BLD-RTO: 17.1 % (ref 12.1–15.2)
PDW BLD-RTO: 17.2 % (ref 12.1–15.2)
PEEP/CPAP: ABNORMAL
PH ARTERIAL: 7.37 (ref 7.35–7.45)
PH ARTERIAL: 7.43 (ref 7.35–7.45)
PH ARTERIAL: 7.45 (ref 7.35–7.45)
PH UA: 5 (ref 5–8)
PH UA: 5.5 (ref 5–9)
PH, ART, TEMP ADJ: ABNORMAL (ref 7.35–7.45)
PH, POC: 7
PH, POC: 7
PLATELET # BLD: 119 K/UL (ref 140–450)
PLATELET # BLD: 165 K/UL (ref 140–450)
PLATELET # BLD: 167 K/UL (ref 140–450)
PLATELET # BLD: 209 K/UL (ref 138–453)
PLATELET # BLD: 234 K/UL (ref 140–450)
PLATELET # BLD: 238 K/UL (ref 138–453)
PLATELET # BLD: 240 K/UL (ref 138–453)
PLATELET # BLD: 261 K/UL (ref 138–453)
PLATELET ESTIMATE: ABNORMAL
PMV BLD AUTO: 10 FL (ref 8.1–13.5)
PMV BLD AUTO: 10.1 FL (ref 8.1–13.5)
PMV BLD AUTO: 9.7 FL (ref 8.1–13.5)
PMV BLD AUTO: 9.8 FL (ref 8.1–13.5)
PMV BLD AUTO: ABNORMAL FL (ref 6–12)
PO2 ARTERIAL: 69.6 MMHG (ref 80–100)
PO2 ARTERIAL: 71.7 MMHG (ref 80–100)
PO2 ARTERIAL: 74.2 MMHG (ref 80–100)
PO2, ART, TEMP ADJ: ABNORMAL MMHG (ref 80–100)
POSITIVE BASE EXCESS, ART: 0.2 MMOL/L (ref 0–2)
POSITIVE BASE EXCESS, ART: ABNORMAL MMOL/L (ref 0–2)
POSITIVE BASE EXCESS, ART: ABNORMAL MMOL/L (ref 0–2)
POTASSIUM SERPL-SCNC: 3.4 MMOL/L (ref 3.7–5.3)
POTASSIUM SERPL-SCNC: 3.5 MMOL/L (ref 3.7–5.3)
POTASSIUM SERPL-SCNC: 3.9 MMOL/L (ref 3.7–5.3)
POTASSIUM SERPL-SCNC: 4 MMOL/L (ref 3.7–5.3)
PROTEIN UA: ABNORMAL
PROTEIN UA: ABNORMAL
PROTEIN, POC: ABNORMAL
PROTEIN, POC: NEGATIVE
PROTHROMBIN TIME: 19.8 SEC (ref 11.5–14.2)
PROTHROMBIN TIME: 22.2 SEC (ref 9–11.6)
PROTHROMBIN TIME: 22.3 SEC (ref 11.5–14.2)
PROTHROMBIN TIME: 25.5 SEC (ref 11.5–14.2)
PROTHROMBIN TIME: 37.9 SEC (ref 11.5–14.2)
PROTHROMBIN TIME: 41.2 SEC (ref 11.5–14.2)
PROTHROMBIN TIME: 44.3 SEC (ref 11.5–14.2)
PROTHROMBIN TIME: 55.9 SEC (ref 11.5–14.2)
PSV: ABNORMAL
PT. POSITION: ABNORMAL
RBC # BLD: 3.71 M/UL (ref 3.95–5.11)
RBC # BLD: 3.82 M/UL (ref 4–5.2)
RBC # BLD: 3.91 M/UL (ref 3.95–5.11)
RBC # BLD: 3.92 M/UL (ref 3.95–5.11)
RBC # BLD: 4 M/UL (ref 4–5.2)
RBC # BLD: 4.1 M/UL (ref 3.95–5.11)
RBC # BLD: 4.21 M/UL (ref 4–5.2)
RBC # BLD: 4.26 M/UL (ref 4–5.2)
RBC # BLD: ABNORMAL 10*6/UL
RBC UA: ABNORMAL /HPF (ref 0–2)
RBC UA: ABNORMAL /HPF (ref 0–2)
RENAL EPITHELIAL, UA: ABNORMAL /HPF
RENAL EPITHELIAL, UA: ABNORMAL /HPF
RESPIRATORY RATE: 20
RESPIRATORY RATE: 20
RESPIRATORY RATE: ABNORMAL
SAMPLE SITE: ABNORMAL
SARS-COV-2, RAPID: DETECTED
SARS-COV-2: ABNORMAL
SARS-COV-2: ABNORMAL
SEG NEUTROPHILS: 70 % (ref 47–75)
SEG NEUTROPHILS: 70 % (ref 47–75)
SEG NEUTROPHILS: 71 % (ref 47–75)
SEG NEUTROPHILS: 80 % (ref 47–75)
SEG NEUTROPHILS: 83 % (ref 36–65)
SEG NEUTROPHILS: 87 % (ref 36–65)
SEG NEUTROPHILS: 88 % (ref 36–65)
SEG NEUTROPHILS: 90 % (ref 36–65)
SEGMENTED NEUTROPHILS ABSOLUTE COUNT: 11.34 K/UL (ref 1.5–8.1)
SEGMENTED NEUTROPHILS ABSOLUTE COUNT: 3 K/UL (ref 2.5–7)
SEGMENTED NEUTROPHILS ABSOLUTE COUNT: 3.3 K/UL (ref 1.5–8.1)
SEGMENTED NEUTROPHILS ABSOLUTE COUNT: 4.3 K/UL (ref 2.5–7)
SEGMENTED NEUTROPHILS ABSOLUTE COUNT: 4.5 K/UL (ref 2.5–7)
SEGMENTED NEUTROPHILS ABSOLUTE COUNT: 5.2 K/UL (ref 2.5–7)
SEGMENTED NEUTROPHILS ABSOLUTE COUNT: 5.48 K/UL (ref 1.5–8.1)
SEGMENTED NEUTROPHILS ABSOLUTE COUNT: 7.13 K/UL (ref 1.5–8.1)
SET RATE: ABNORMAL
SODIUM BLD-SCNC: 131 MMOL/L (ref 135–144)
SODIUM BLD-SCNC: 136 MMOL/L (ref 135–144)
SODIUM BLD-SCNC: 137 MMOL/L (ref 135–144)
SODIUM BLD-SCNC: 140 MMOL/L (ref 135–144)
SODIUM BLD-SCNC: 141 MMOL/L (ref 135–144)
SODIUM BLD-SCNC: 141 MMOL/L (ref 135–144)
SODIUM BLD-SCNC: 142 MMOL/L (ref 135–144)
SODIUM BLD-SCNC: 147 MMOL/L (ref 135–144)
SOURCE: ABNORMAL
SPECIFIC GRAVITY UA: 1.02 (ref 1.01–1.02)
SPECIFIC GRAVITY UA: 1.02 (ref 1–1.03)
SPECIFIC GRAVITY, POC: 1.01
SPECIFIC GRAVITY, POC: 1.02
SPECIMEN DESCRIPTION: ABNORMAL
SPECIMEN DESCRIPTION: NORMAL
SPECIMEN DESCRIPTION: NORMAL
TEXT FOR RESPIRATORY: ABNORMAL
TOTAL HB: ABNORMAL G/DL (ref 12–16)
TOTAL PROTEIN: 6.1 G/DL (ref 6.4–8.3)
TOTAL PROTEIN: 6.7 G/DL (ref 6.4–8.3)
TOTAL PROTEIN: 7 G/DL (ref 6.4–8.3)
TOTAL PROTEIN: 7.1 G/DL (ref 6.4–8.3)
TOTAL PROTEIN: 7.1 G/DL (ref 6.4–8.3)
TOTAL PROTEIN: 7.3 G/DL (ref 6.4–8.3)
TOTAL PROTEIN: 7.4 G/DL (ref 6.4–8.3)
TOTAL PROTEIN: 7.8 G/DL (ref 6.4–8.3)
TOTAL RATE: ABNORMAL
TRICHOMONAS: ABNORMAL
TRICHOMONAS: ABNORMAL
TRIGL SERPL-MCNC: 70 MG/DL
TROPONIN INTERP: NORMAL
TROPONIN INTERP: NORMAL
TROPONIN T: <0.03 NG/ML
TROPONIN T: <0.03 NG/ML
TROPONIN, HIGH SENSITIVITY: NORMAL NG/L (ref 0–14)
TROPONIN, HIGH SENSITIVITY: NORMAL NG/L (ref 0–14)
TSH SERPL DL<=0.05 MIU/L-ACNC: 0.69 MIU/L (ref 0.3–5)
TSH SERPL DL<=0.05 MIU/L-ACNC: 1.04 MIU/L (ref 0.3–5)
TURBIDITY: ABNORMAL
TURBIDITY: CLEAR
URINE HGB: ABNORMAL
URINE HGB: NEGATIVE
UROBILINOGEN, POC: 0.2
UROBILINOGEN, POC: 0.2
UROBILINOGEN, URINE: ABNORMAL
UROBILINOGEN, URINE: NORMAL
VITAMIN D 25-HYDROXY: 50.4 NG/ML (ref 30–100)
VLDLC SERPL CALC-MCNC: NORMAL MG/DL (ref 1–30)
VT: ABNORMAL
WBC # BLD: 12.6 K/UL (ref 3.5–11.3)
WBC # BLD: 3.8 K/UL (ref 3.5–11.3)
WBC # BLD: 4.2 K/UL (ref 3.5–11)
WBC # BLD: 5.5 K/UL (ref 3.5–11)
WBC # BLD: 6.4 K/UL (ref 3.5–11)
WBC # BLD: 6.6 K/UL (ref 3.5–11.3)
WBC # BLD: 7.4 K/UL (ref 3.5–11)
WBC # BLD: 8.1 K/UL (ref 3.5–11.3)
WBC # BLD: ABNORMAL 10*3/UL
WBC UA: ABNORMAL /HPF
WBC UA: ABNORMAL /HPF (ref 0–5)
YEAST: ABNORMAL
YEAST: ABNORMAL

## 2020-01-01 PROCEDURE — 85610 PROTHROMBIN TIME: CPT

## 2020-01-01 PROCEDURE — 80053 COMPREHEN METABOLIC PANEL: CPT

## 2020-01-01 PROCEDURE — 1036F TOBACCO NON-USER: CPT | Performed by: NURSE PRACTITIONER

## 2020-01-01 PROCEDURE — 1090F PRES/ABSN URINE INCON ASSESS: CPT | Performed by: INTERNAL MEDICINE

## 2020-01-01 PROCEDURE — 82728 ASSAY OF FERRITIN: CPT

## 2020-01-01 PROCEDURE — G8482 FLU IMMUNIZE ORDER/ADMIN: HCPCS | Performed by: NURSE PRACTITIONER

## 2020-01-01 PROCEDURE — 99211 OFF/OP EST MAY X REQ PHY/QHP: CPT

## 2020-01-01 PROCEDURE — 2700000000 HC OXYGEN THERAPY PER DAY

## 2020-01-01 PROCEDURE — 99203 OFFICE O/P NEW LOW 30 MIN: CPT | Performed by: PHYSICIAN ASSISTANT

## 2020-01-01 PROCEDURE — 82805 BLOOD GASES W/O2 SATURATION: CPT

## 2020-01-01 PROCEDURE — 6370000000 HC RX 637 (ALT 250 FOR IP): Performed by: NURSE PRACTITIONER

## 2020-01-01 PROCEDURE — 1123F ACP DISCUSS/DSCN MKR DOCD: CPT | Performed by: NURSE PRACTITIONER

## 2020-01-01 PROCEDURE — G8427 DOCREV CUR MEDS BY ELIG CLIN: HCPCS | Performed by: PHYSICIAN ASSISTANT

## 2020-01-01 PROCEDURE — 94761 N-INVAS EAR/PLS OXIMETRY MLT: CPT

## 2020-01-01 PROCEDURE — 85025 COMPLETE CBC W/AUTO DIFF WBC: CPT

## 2020-01-01 PROCEDURE — 2580000003 HC RX 258: Performed by: FAMILY MEDICINE

## 2020-01-01 PROCEDURE — 1090F PRES/ABSN URINE INCON ASSESS: CPT | Performed by: NURSE PRACTITIONER

## 2020-01-01 PROCEDURE — 85379 FIBRIN DEGRADATION QUANT: CPT

## 2020-01-01 PROCEDURE — 6370000000 HC RX 637 (ALT 250 FOR IP): Performed by: INTERNAL MEDICINE

## 2020-01-01 PROCEDURE — 99212 OFFICE O/P EST SF 10 MIN: CPT | Performed by: PHYSICIAN ASSISTANT

## 2020-01-01 PROCEDURE — 1123F ACP DISCUSS/DSCN MKR DOCD: CPT | Performed by: PHYSICIAN ASSISTANT

## 2020-01-01 PROCEDURE — 99214 OFFICE O/P EST MOD 30 MIN: CPT | Performed by: INTERNAL MEDICINE

## 2020-01-01 PROCEDURE — G8427 DOCREV CUR MEDS BY ELIG CLIN: HCPCS | Performed by: FAMILY MEDICINE

## 2020-01-01 PROCEDURE — 6360000002 HC RX W HCPCS: Performed by: FAMILY MEDICINE

## 2020-01-01 PROCEDURE — 85730 THROMBOPLASTIN TIME PARTIAL: CPT

## 2020-01-01 PROCEDURE — 1036F TOBACCO NON-USER: CPT | Performed by: INTERNAL MEDICINE

## 2020-01-01 PROCEDURE — 99213 OFFICE O/P EST LOW 20 MIN: CPT | Performed by: NURSE PRACTITIONER

## 2020-01-01 PROCEDURE — 4040F PNEUMOC VAC/ADMIN/RCVD: CPT | Performed by: PHYSICIAN ASSISTANT

## 2020-01-01 PROCEDURE — 81003 URINALYSIS AUTO W/O SCOPE: CPT | Performed by: NURSE PRACTITIONER

## 2020-01-01 PROCEDURE — 1090F PRES/ABSN URINE INCON ASSESS: CPT | Performed by: FAMILY MEDICINE

## 2020-01-01 PROCEDURE — 85384 FIBRINOGEN ACTIVITY: CPT

## 2020-01-01 PROCEDURE — 1036F TOBACCO NON-USER: CPT | Performed by: PHYSICIAN ASSISTANT

## 2020-01-01 PROCEDURE — 97110 THERAPEUTIC EXERCISES: CPT

## 2020-01-01 PROCEDURE — 96365 THER/PROPH/DIAG IV INF INIT: CPT

## 2020-01-01 PROCEDURE — G8427 DOCREV CUR MEDS BY ELIG CLIN: HCPCS | Performed by: NURSE PRACTITIONER

## 2020-01-01 PROCEDURE — 4040F PNEUMOC VAC/ADMIN/RCVD: CPT | Performed by: FAMILY MEDICINE

## 2020-01-01 PROCEDURE — 2000000000 HC ICU R&B

## 2020-01-01 PROCEDURE — 71046 X-RAY EXAM CHEST 2 VIEWS: CPT

## 2020-01-01 PROCEDURE — 6360000002 HC RX W HCPCS: Performed by: NURSE PRACTITIONER

## 2020-01-01 PROCEDURE — 1090F PRES/ABSN URINE INCON ASSESS: CPT | Performed by: PHYSICIAN ASSISTANT

## 2020-01-01 PROCEDURE — 1123F ACP DISCUSS/DSCN MKR DOCD: CPT | Performed by: FAMILY MEDICINE

## 2020-01-01 PROCEDURE — 70551 MRI BRAIN STEM W/O DYE: CPT

## 2020-01-01 PROCEDURE — G8482 FLU IMMUNIZE ORDER/ADMIN: HCPCS | Performed by: PHYSICIAN ASSISTANT

## 2020-01-01 PROCEDURE — G8417 CALC BMI ABV UP PARAM F/U: HCPCS | Performed by: FAMILY MEDICINE

## 2020-01-01 PROCEDURE — G8417 CALC BMI ABV UP PARAM F/U: HCPCS | Performed by: OTOLARYNGOLOGY

## 2020-01-01 PROCEDURE — 36415 COLL VENOUS BLD VENIPUNCTURE: CPT

## 2020-01-01 PROCEDURE — 2500000003 HC RX 250 WO HCPCS: Performed by: INTERNAL MEDICINE

## 2020-01-01 PROCEDURE — 99285 EMERGENCY DEPT VISIT HI MDM: CPT

## 2020-01-01 PROCEDURE — 93010 ELECTROCARDIOGRAM REPORT: CPT | Performed by: INTERNAL MEDICINE

## 2020-01-01 PROCEDURE — 99214 OFFICE O/P EST MOD 30 MIN: CPT | Performed by: NURSE PRACTITIONER

## 2020-01-01 PROCEDURE — 81001 URINALYSIS AUTO W/SCOPE: CPT

## 2020-01-01 PROCEDURE — 2580000003 HC RX 258: Performed by: INTERNAL MEDICINE

## 2020-01-01 PROCEDURE — 99222 1ST HOSP IP/OBS MODERATE 55: CPT | Performed by: INTERNAL MEDICINE

## 2020-01-01 PROCEDURE — 87186 SC STD MICRODIL/AGAR DIL: CPT

## 2020-01-01 PROCEDURE — 36600 WITHDRAWAL OF ARTERIAL BLOOD: CPT

## 2020-01-01 PROCEDURE — G8417 CALC BMI ABV UP PARAM F/U: HCPCS | Performed by: NURSE PRACTITIONER

## 2020-01-01 PROCEDURE — 1200000000 HC SEMI PRIVATE

## 2020-01-01 PROCEDURE — 1036F TOBACCO NON-USER: CPT | Performed by: FAMILY MEDICINE

## 2020-01-01 PROCEDURE — 70450 CT HEAD/BRAIN W/O DYE: CPT

## 2020-01-01 PROCEDURE — 2580000003 HC RX 258

## 2020-01-01 PROCEDURE — G8417 CALC BMI ABV UP PARAM F/U: HCPCS | Performed by: INTERNAL MEDICINE

## 2020-01-01 PROCEDURE — 71045 X-RAY EXAM CHEST 1 VIEW: CPT

## 2020-01-01 PROCEDURE — 2500000003 HC RX 250 WO HCPCS: Performed by: NURSE PRACTITIONER

## 2020-01-01 PROCEDURE — 93005 ELECTROCARDIOGRAM TRACING: CPT | Performed by: FAMILY MEDICINE

## 2020-01-01 PROCEDURE — 87086 URINE CULTURE/COLONY COUNT: CPT

## 2020-01-01 PROCEDURE — 99213 OFFICE O/P EST LOW 20 MIN: CPT | Performed by: OTOLARYNGOLOGY

## 2020-01-01 PROCEDURE — 83615 LACTATE (LD) (LDH) ENZYME: CPT

## 2020-01-01 PROCEDURE — 30901 CONTROL OF NOSEBLEED: CPT | Performed by: PHYSICIAN ASSISTANT

## 2020-01-01 PROCEDURE — 1123F ACP DISCUSS/DSCN MKR DOCD: CPT | Performed by: INTERNAL MEDICINE

## 2020-01-01 PROCEDURE — 1123F ACP DISCUSS/DSCN MKR DOCD: CPT | Performed by: OTOLARYNGOLOGY

## 2020-01-01 PROCEDURE — G8427 DOCREV CUR MEDS BY ELIG CLIN: HCPCS | Performed by: INTERNAL MEDICINE

## 2020-01-01 PROCEDURE — C9803 HOPD COVID-19 SPEC COLLECT: HCPCS

## 2020-01-01 PROCEDURE — 93880 EXTRACRANIAL BILAT STUDY: CPT

## 2020-01-01 PROCEDURE — 84443 ASSAY THYROID STIM HORMONE: CPT

## 2020-01-01 PROCEDURE — 83605 ASSAY OF LACTIC ACID: CPT

## 2020-01-01 PROCEDURE — G8420 CALC BMI NORM PARAMETERS: HCPCS | Performed by: FAMILY MEDICINE

## 2020-01-01 PROCEDURE — XW033E5 INTRODUCTION OF REMDESIVIR ANTI-INFECTIVE INTO PERIPHERAL VEIN, PERCUTANEOUS APPROACH, NEW TECHNOLOGY GROUP 5: ICD-10-PCS | Performed by: INTERNAL MEDICINE

## 2020-01-01 PROCEDURE — 97530 THERAPEUTIC ACTIVITIES: CPT

## 2020-01-01 PROCEDURE — 4040F PNEUMOC VAC/ADMIN/RCVD: CPT | Performed by: INTERNAL MEDICINE

## 2020-01-01 PROCEDURE — 2500000003 HC RX 250 WO HCPCS: Performed by: FAMILY MEDICINE

## 2020-01-01 PROCEDURE — 99213 OFFICE O/P EST LOW 20 MIN: CPT | Performed by: FAMILY MEDICINE

## 2020-01-01 PROCEDURE — 99284 EMERGENCY DEPT VISIT MOD MDM: CPT

## 2020-01-01 PROCEDURE — 93005 ELECTROCARDIOGRAM TRACING: CPT

## 2020-01-01 PROCEDURE — 30903 CONTROL OF NOSEBLEED: CPT | Performed by: OTOLARYNGOLOGY

## 2020-01-01 PROCEDURE — 84484 ASSAY OF TROPONIN QUANT: CPT

## 2020-01-01 PROCEDURE — 1090F PRES/ABSN URINE INCON ASSESS: CPT | Performed by: OTOLARYNGOLOGY

## 2020-01-01 PROCEDURE — 51702 INSERT TEMP BLADDER CATH: CPT

## 2020-01-01 PROCEDURE — 82306 VITAMIN D 25 HYDROXY: CPT

## 2020-01-01 PROCEDURE — 83735 ASSAY OF MAGNESIUM: CPT

## 2020-01-01 PROCEDURE — 2580000003 HC RX 258: Performed by: NURSE PRACTITIONER

## 2020-01-01 PROCEDURE — 93306 TTE W/DOPPLER COMPLETE: CPT

## 2020-01-01 PROCEDURE — 99283 EMERGENCY DEPT VISIT LOW MDM: CPT

## 2020-01-01 PROCEDURE — G8417 CALC BMI ABV UP PARAM F/U: HCPCS | Performed by: PHYSICIAN ASSISTANT

## 2020-01-01 PROCEDURE — 97163 PT EVAL HIGH COMPLEX 45 MIN: CPT

## 2020-01-01 PROCEDURE — 97166 OT EVAL MOD COMPLEX 45 MIN: CPT

## 2020-01-01 PROCEDURE — 4040F PNEUMOC VAC/ADMIN/RCVD: CPT | Performed by: NURSE PRACTITIONER

## 2020-01-01 PROCEDURE — U0002 COVID-19 LAB TEST NON-CDC: HCPCS

## 2020-01-01 PROCEDURE — 97535 SELF CARE MNGMENT TRAINING: CPT

## 2020-01-01 PROCEDURE — 81002 URINALYSIS NONAUTO W/O SCOPE: CPT | Performed by: NURSE PRACTITIONER

## 2020-01-01 PROCEDURE — G8484 FLU IMMUNIZE NO ADMIN: HCPCS | Performed by: FAMILY MEDICINE

## 2020-01-01 PROCEDURE — 87077 CULTURE AEROBIC IDENTIFY: CPT

## 2020-01-01 PROCEDURE — 80061 LIPID PANEL: CPT

## 2020-01-01 PROCEDURE — G8427 DOCREV CUR MEDS BY ELIG CLIN: HCPCS | Performed by: OTOLARYNGOLOGY

## 2020-01-01 PROCEDURE — G8482 FLU IMMUNIZE ORDER/ADMIN: HCPCS | Performed by: OTOLARYNGOLOGY

## 2020-01-01 RX ORDER — LEVOTHYROXINE SODIUM 0.07 MG/1
TABLET ORAL
Qty: 90 TABLET | Refills: 1 | Status: SHIPPED | OUTPATIENT
Start: 2020-01-01 | End: 2020-01-01 | Stop reason: SDUPTHER

## 2020-01-01 RX ORDER — DILTIAZEM HYDROCHLORIDE 120 MG/1
120 CAPSULE, COATED, EXTENDED RELEASE ORAL DAILY
Status: DISCONTINUED | OUTPATIENT
Start: 2020-01-01 | End: 2020-01-01 | Stop reason: HOSPADM

## 2020-01-01 RX ORDER — ALLOPURINOL 100 MG/1
100 TABLET ORAL 2 TIMES DAILY
Status: DISCONTINUED | OUTPATIENT
Start: 2020-01-01 | End: 2020-01-01 | Stop reason: HOSPADM

## 2020-01-01 RX ORDER — MAGNESIUM OXIDE 400 MG/1
400 TABLET ORAL DAILY
Qty: 90 TABLET | Refills: 3 | Status: SHIPPED | OUTPATIENT
Start: 2020-01-01 | End: 2020-01-01 | Stop reason: SDUPTHER

## 2020-01-01 RX ORDER — LORAZEPAM 2 MG/ML
0.5 INJECTION INTRAMUSCULAR EVERY 6 HOURS PRN
Status: DISCONTINUED | OUTPATIENT
Start: 2020-01-01 | End: 2020-01-01 | Stop reason: HOSPADM

## 2020-01-01 RX ORDER — WARFARIN SODIUM 2 MG/1
2 TABLET ORAL
Status: COMPLETED | OUTPATIENT
Start: 2020-01-01 | End: 2020-01-01

## 2020-01-01 RX ORDER — WARFARIN SODIUM 2 MG/1
TABLET ORAL
Qty: 180 TABLET | Refills: 3 | Status: ON HOLD | OUTPATIENT
Start: 2020-01-01 | End: 2020-01-01 | Stop reason: HOSPADM

## 2020-01-01 RX ORDER — DOCUSATE SODIUM 100 MG/1
100 CAPSULE, LIQUID FILLED ORAL DAILY PRN
Status: DISCONTINUED | OUTPATIENT
Start: 2020-01-01 | End: 2020-01-01 | Stop reason: HOSPADM

## 2020-01-01 RX ORDER — ALBUTEROL SULFATE 90 UG/1
2 AEROSOL, METERED RESPIRATORY (INHALATION) EVERY 6 HOURS PRN
Status: DISCONTINUED | OUTPATIENT
Start: 2020-01-01 | End: 2020-01-01 | Stop reason: HOSPADM

## 2020-01-01 RX ORDER — WARFARIN SODIUM 5 MG/1
TABLET ORAL
Qty: 30 TABLET | Refills: 11 | Status: SHIPPED | OUTPATIENT
Start: 2020-01-01 | End: 2020-01-01 | Stop reason: SDUPTHER

## 2020-01-01 RX ORDER — LANOLIN ALCOHOL/MO/W.PET/CERES
400 CREAM (GRAM) TOPICAL DAILY
Status: DISCONTINUED | OUTPATIENT
Start: 2020-01-01 | End: 2020-01-01 | Stop reason: HOSPADM

## 2020-01-01 RX ORDER — WARFARIN SODIUM 1 MG/1
0.5 TABLET ORAL
Status: COMPLETED | OUTPATIENT
Start: 2020-01-01 | End: 2020-01-01

## 2020-01-01 RX ORDER — ASPIRIN 81 MG/1
81 TABLET ORAL DAILY
Status: DISCONTINUED | OUTPATIENT
Start: 2020-01-01 | End: 2020-01-01 | Stop reason: HOSPADM

## 2020-01-01 RX ORDER — LOSARTAN POTASSIUM 50 MG/1
50 TABLET ORAL DAILY
Status: DISCONTINUED | OUTPATIENT
Start: 2020-01-01 | End: 2020-01-01 | Stop reason: HOSPADM

## 2020-01-01 RX ORDER — NITROFURANTOIN 25; 75 MG/1; MG/1
100 CAPSULE ORAL 2 TIMES DAILY
Qty: 20 CAPSULE | Refills: 0 | Status: ON HOLD | OUTPATIENT
Start: 2020-01-01 | End: 2020-01-01 | Stop reason: HOSPADM

## 2020-01-01 RX ORDER — LOSARTAN POTASSIUM 50 MG/1
TABLET ORAL
Qty: 180 TABLET | Refills: 3 | Status: ON HOLD | OUTPATIENT
Start: 2020-01-01 | End: 2020-01-01 | Stop reason: HOSPADM

## 2020-01-01 RX ORDER — LEVOTHYROXINE SODIUM 0.07 MG/1
TABLET ORAL
Qty: 90 TABLET | Refills: 1 | Status: SHIPPED | OUTPATIENT
Start: 2020-01-01 | End: 2020-01-01

## 2020-01-01 RX ORDER — LEVOTHYROXINE SODIUM 0.07 MG/1
75 TABLET ORAL DAILY
Status: DISCONTINUED | OUTPATIENT
Start: 2020-01-01 | End: 2020-01-01 | Stop reason: HOSPADM

## 2020-01-01 RX ORDER — PHYTONADIONE 5 MG/1
5 TABLET ORAL ONCE
Status: DISCONTINUED | OUTPATIENT
Start: 2020-01-01 | End: 2020-01-01

## 2020-01-01 RX ORDER — HYDRALAZINE HYDROCHLORIDE 20 MG/ML
10 INJECTION INTRAMUSCULAR; INTRAVENOUS ONCE
Status: COMPLETED | OUTPATIENT
Start: 2020-01-01 | End: 2020-01-01

## 2020-01-01 RX ORDER — CEPHALEXIN 500 MG/1
500 CAPSULE ORAL 3 TIMES DAILY
Qty: 15 CAPSULE | Refills: 0 | Status: SHIPPED | OUTPATIENT
Start: 2020-01-01 | End: 2020-01-01

## 2020-01-01 RX ORDER — 0.9 % SODIUM CHLORIDE 0.9 %
500 INTRAVENOUS SOLUTION INTRAVENOUS ONCE
Status: COMPLETED | OUTPATIENT
Start: 2020-01-01 | End: 2020-01-01

## 2020-01-01 RX ORDER — DILTIAZEM HYDROCHLORIDE 120 MG/1
CAPSULE, COATED, EXTENDED RELEASE ORAL
Qty: 90 CAPSULE | Refills: 3 | Status: ON HOLD | OUTPATIENT
Start: 2020-01-01 | End: 2020-01-01 | Stop reason: HOSPADM

## 2020-01-01 RX ORDER — MECLIZINE HCL 12.5 MG/1
TABLET ORAL
COMMUNITY
Start: 2020-01-01 | End: 2020-01-01 | Stop reason: SDUPTHER

## 2020-01-01 RX ORDER — WARFARIN SODIUM 5 MG/1
TABLET ORAL
Qty: 30 TABLET | Refills: 11 | Status: SHIPPED
Start: 2020-01-01 | End: 2020-01-01 | Stop reason: SDUPTHER

## 2020-01-01 RX ORDER — WARFARIN SODIUM 5 MG/1
TABLET ORAL
Qty: 30 TABLET | Refills: 11 | Status: SHIPPED | OUTPATIENT
Start: 2020-01-01 | End: 2020-01-01 | Stop reason: DRUGHIGH

## 2020-01-01 RX ORDER — ATORVASTATIN CALCIUM 10 MG/1
10 TABLET, FILM COATED ORAL NIGHTLY
Qty: 90 TABLET | Refills: 3 | Status: ON HOLD | OUTPATIENT
Start: 2020-01-01 | End: 2020-01-01 | Stop reason: HOSPADM

## 2020-01-01 RX ORDER — ACETAMINOPHEN 650 MG/1
650 SUPPOSITORY RECTAL EVERY 6 HOURS PRN
Status: DISCONTINUED | OUTPATIENT
Start: 2020-01-01 | End: 2020-01-01 | Stop reason: HOSPADM

## 2020-01-01 RX ORDER — ALLOPURINOL 100 MG/1
TABLET ORAL
Qty: 60 TABLET | Refills: 2 | Status: ON HOLD | OUTPATIENT
Start: 2020-01-01 | End: 2020-01-01 | Stop reason: HOSPADM

## 2020-01-01 RX ORDER — NITROFURANTOIN 25; 75 MG/1; MG/1
100 CAPSULE ORAL 2 TIMES DAILY
Qty: 14 CAPSULE | Refills: 0 | Status: SHIPPED | OUTPATIENT
Start: 2020-01-01 | End: 2020-01-01

## 2020-01-01 RX ORDER — METOPROLOL TARTRATE 5 MG/5ML
5 INJECTION INTRAVENOUS EVERY 6 HOURS PRN
Status: DISCONTINUED | OUTPATIENT
Start: 2020-01-01 | End: 2020-01-01 | Stop reason: HOSPADM

## 2020-01-01 RX ORDER — REMDESIVIR 100 MG/1
INJECTION, POWDER, LYOPHILIZED, FOR SOLUTION INTRAVENOUS
Status: DISPENSED
Start: 2020-01-01 | End: 2020-01-01

## 2020-01-01 RX ORDER — WARFARIN SODIUM 5 MG/1
TABLET ORAL
Qty: 30 TABLET | Refills: 11 | Status: ON HOLD | OUTPATIENT
Start: 2020-01-01 | End: 2020-01-01 | Stop reason: HOSPADM

## 2020-01-01 RX ORDER — NITROFURANTOIN 25; 75 MG/1; MG/1
100 CAPSULE ORAL 2 TIMES DAILY
Status: DISCONTINUED | OUTPATIENT
Start: 2020-01-01 | End: 2020-01-01 | Stop reason: HOSPADM

## 2020-01-01 RX ORDER — LORAZEPAM 1 MG/1
1 TABLET ORAL EVERY 6 HOURS PRN
Status: DISCONTINUED | OUTPATIENT
Start: 2020-01-01 | End: 2020-01-01

## 2020-01-01 RX ORDER — PHYTONADIONE 10 MG/ML
2.5 INJECTION, EMULSION INTRAMUSCULAR; INTRAVENOUS; SUBCUTANEOUS ONCE
Status: COMPLETED | OUTPATIENT
Start: 2020-01-01 | End: 2020-01-01

## 2020-01-01 RX ORDER — MORPHINE SULFATE 2 MG/ML
2 INJECTION, SOLUTION INTRAMUSCULAR; INTRAVENOUS
Status: DISCONTINUED | OUTPATIENT
Start: 2020-01-01 | End: 2020-01-01 | Stop reason: HOSPADM

## 2020-01-01 RX ORDER — 0.9 % SODIUM CHLORIDE 0.9 %
30 INTRAVENOUS SOLUTION INTRAVENOUS PRN
Status: DISCONTINUED | OUTPATIENT
Start: 2020-01-01 | End: 2020-01-01

## 2020-01-01 RX ORDER — ATORVASTATIN CALCIUM 10 MG/1
10 TABLET, FILM COATED ORAL NIGHTLY
Status: DISCONTINUED | OUTPATIENT
Start: 2020-01-01 | End: 2020-01-01 | Stop reason: HOSPADM

## 2020-01-01 RX ORDER — DEXAMETHASONE SODIUM PHOSPHATE 4 MG/ML
6 INJECTION, SOLUTION INTRA-ARTICULAR; INTRALESIONAL; INTRAMUSCULAR; INTRAVENOUS; SOFT TISSUE DAILY
Status: DISCONTINUED | OUTPATIENT
Start: 2020-01-01 | End: 2020-01-01 | Stop reason: HOSPADM

## 2020-01-01 RX ORDER — MAGNESIUM OXIDE 400 MG/1
400 TABLET ORAL DAILY
Qty: 90 TABLET | Refills: 3 | Status: ON HOLD | OUTPATIENT
Start: 2020-01-01 | End: 2020-01-01 | Stop reason: HOSPADM

## 2020-01-01 RX ORDER — ACETAMINOPHEN 325 MG/1
650 TABLET ORAL EVERY 6 HOURS PRN
Status: DISCONTINUED | OUTPATIENT
Start: 2020-01-01 | End: 2020-01-01

## 2020-01-01 RX ORDER — ALLOPURINOL 100 MG/1
TABLET ORAL
Qty: 180 TABLET | Refills: 1 | Status: SHIPPED | OUTPATIENT
Start: 2020-01-01 | End: 2020-01-01 | Stop reason: SDUPTHER

## 2020-01-01 RX ORDER — MECLIZINE HCL 12.5 MG/1
12.5 TABLET ORAL 3 TIMES DAILY PRN
Qty: 30 TABLET | Refills: 6 | Status: SHIPPED | OUTPATIENT
Start: 2020-01-01 | End: 2020-01-01

## 2020-01-01 RX ORDER — MECLIZINE HCL 12.5 MG/1
TABLET ORAL
Qty: 90 TABLET | Refills: 1 | Status: ON HOLD | OUTPATIENT
Start: 2020-01-01 | End: 2020-01-01 | Stop reason: HOSPADM

## 2020-01-01 RX ADMIN — CEFTRIAXONE SODIUM 1 G: 1 INJECTION, POWDER, FOR SOLUTION INTRAMUSCULAR; INTRAVENOUS at 19:44

## 2020-01-01 RX ADMIN — WATER: 1 INJECTION INTRAMUSCULAR; INTRAVENOUS; SUBCUTANEOUS at 00:56

## 2020-01-01 RX ADMIN — METOPROLOL TARTRATE 5 MG: 5 INJECTION INTRAVENOUS at 23:50

## 2020-01-01 RX ADMIN — ASPIRIN 81 MG: 81 TABLET, COATED ORAL at 12:27

## 2020-01-01 RX ADMIN — LORAZEPAM 0.5 MG: 2 INJECTION INTRAMUSCULAR; INTRAVENOUS at 11:24

## 2020-01-01 RX ADMIN — ALLOPURINOL 100 MG: 100 TABLET ORAL at 20:10

## 2020-01-01 RX ADMIN — REMDESIVIR 100 MG: 100 INJECTION, POWDER, LYOPHILIZED, FOR SOLUTION INTRAVENOUS at 23:52

## 2020-01-01 RX ADMIN — DILTIAZEM HYDROCHLORIDE 120 MG: 120 CAPSULE, COATED, EXTENDED RELEASE ORAL at 08:21

## 2020-01-01 RX ADMIN — LEVOTHYROXINE SODIUM 75 MCG: 75 TABLET ORAL at 12:27

## 2020-01-01 RX ADMIN — REMDESIVIR 100 MG: 100 INJECTION, POWDER, LYOPHILIZED, FOR SOLUTION INTRAVENOUS at 23:43

## 2020-01-01 RX ADMIN — HYDRALAZINE HYDROCHLORIDE 10 MG: 20 INJECTION INTRAMUSCULAR; INTRAVENOUS at 02:44

## 2020-01-01 RX ADMIN — LEVOTHYROXINE SODIUM 75 MCG: 75 TABLET ORAL at 09:43

## 2020-01-01 RX ADMIN — ALLOPURINOL 100 MG: 100 TABLET ORAL at 20:39

## 2020-01-01 RX ADMIN — REMDESIVIR 200 MG: 100 INJECTION, POWDER, LYOPHILIZED, FOR SOLUTION INTRAVENOUS at 23:58

## 2020-01-01 RX ADMIN — LOSARTAN POTASSIUM 50 MG: 50 TABLET, FILM COATED ORAL at 09:42

## 2020-01-01 RX ADMIN — SODIUM CHLORIDE 500 ML: 9 INJECTION, SOLUTION INTRAVENOUS at 09:54

## 2020-01-01 RX ADMIN — METOPROLOL TARTRATE 25 MG: 25 TABLET, FILM COATED ORAL at 20:09

## 2020-01-01 RX ADMIN — LOSARTAN POTASSIUM 50 MG: 50 TABLET, FILM COATED ORAL at 08:21

## 2020-01-01 RX ADMIN — METOPROLOL TARTRATE 25 MG: 25 TABLET, FILM COATED ORAL at 08:12

## 2020-01-01 RX ADMIN — METOPROLOL TARTRATE 25 MG: 25 TABLET, FILM COATED ORAL at 12:27

## 2020-01-01 RX ADMIN — Medication 400 MG: at 12:27

## 2020-01-01 RX ADMIN — NITROFURANTOIN MONOHYDRATE/MACROCRYSTALLINE 100 MG: 25; 75 CAPSULE ORAL at 20:39

## 2020-01-01 RX ADMIN — MORPHINE SULFATE 2 MG: 2 INJECTION, SOLUTION INTRAMUSCULAR; INTRAVENOUS at 13:06

## 2020-01-01 RX ADMIN — REMDESIVIR 100 MG: 100 INJECTION, POWDER, LYOPHILIZED, FOR SOLUTION INTRAVENOUS at 23:17

## 2020-01-01 RX ADMIN — WATER 20 ML: 1 INJECTION INTRAMUSCULAR; INTRAVENOUS; SUBCUTANEOUS at 23:50

## 2020-01-01 RX ADMIN — METOPROLOL TARTRATE 5 MG: 5 INJECTION INTRAVENOUS at 00:20

## 2020-01-01 RX ADMIN — METOPROLOL TARTRATE 5 MG: 5 INJECTION INTRAVENOUS at 16:42

## 2020-01-01 RX ADMIN — ATORVASTATIN CALCIUM 10 MG: 10 TABLET, FILM COATED ORAL at 20:09

## 2020-01-01 RX ADMIN — ATORVASTATIN CALCIUM 10 MG: 10 TABLET, FILM COATED ORAL at 20:58

## 2020-01-01 RX ADMIN — Medication 400 MG: at 08:11

## 2020-01-01 RX ADMIN — NITROFURANTOIN MONOHYDRATE/MACROCRYSTALLINE 100 MG: 25; 75 CAPSULE ORAL at 12:27

## 2020-01-01 RX ADMIN — METOPROLOL TARTRATE 25 MG: 25 TABLET, FILM COATED ORAL at 08:21

## 2020-01-01 RX ADMIN — LORAZEPAM 1 MG: 1 TABLET ORAL at 20:09

## 2020-01-01 RX ADMIN — ASPIRIN 81 MG: 81 TABLET, COATED ORAL at 08:12

## 2020-01-01 RX ADMIN — LORAZEPAM 1 MG: 1 TABLET ORAL at 13:35

## 2020-01-01 RX ADMIN — ASPIRIN 81 MG: 81 TABLET, COATED ORAL at 09:43

## 2020-01-01 RX ADMIN — WARFARIN SODIUM 2 MG: 2 TABLET ORAL at 17:31

## 2020-01-01 RX ADMIN — DEXAMETHASONE SODIUM PHOSPHATE 6 MG: 4 INJECTION, SOLUTION INTRAMUSCULAR; INTRAVENOUS at 02:01

## 2020-01-01 RX ADMIN — METOPROLOL TARTRATE 5 MG: 5 INJECTION INTRAVENOUS at 07:11

## 2020-01-01 RX ADMIN — DEXAMETHASONE SODIUM PHOSPHATE 6 MG: 4 INJECTION, SOLUTION INTRAMUSCULAR; INTRAVENOUS at 01:00

## 2020-01-01 RX ADMIN — DEXAMETHASONE SODIUM PHOSPHATE 6 MG: 4 INJECTION, SOLUTION INTRAMUSCULAR; INTRAVENOUS at 00:28

## 2020-01-01 RX ADMIN — ASPIRIN 81 MG: 81 TABLET, COATED ORAL at 08:21

## 2020-01-01 RX ADMIN — DEXAMETHASONE SODIUM PHOSPHATE 6 MG: 4 INJECTION, SOLUTION INTRAMUSCULAR; INTRAVENOUS at 01:23

## 2020-01-01 RX ADMIN — NITROFURANTOIN MONOHYDRATE/MACROCRYSTALLINE 100 MG: 25; 75 CAPSULE ORAL at 09:43

## 2020-01-01 RX ADMIN — NITROFURANTOIN MONOHYDRATE/MACROCRYSTALLINE 100 MG: 25; 75 CAPSULE ORAL at 20:09

## 2020-01-01 RX ADMIN — METOPROLOL TARTRATE 5 MG: 5 INJECTION INTRAVENOUS at 06:19

## 2020-01-01 RX ADMIN — ALLOPURINOL 100 MG: 100 TABLET ORAL at 09:43

## 2020-01-01 RX ADMIN — DILTIAZEM HYDROCHLORIDE 120 MG: 120 CAPSULE, COATED, EXTENDED RELEASE ORAL at 08:11

## 2020-01-01 RX ADMIN — LORAZEPAM 1 MG: 1 TABLET ORAL at 17:08

## 2020-01-01 RX ADMIN — DEXAMETHASONE SODIUM PHOSPHATE 6 MG: 4 INJECTION, SOLUTION INTRAMUSCULAR; INTRAVENOUS at 00:20

## 2020-01-01 RX ADMIN — WARFARIN SODIUM 0.5 MG: 1 TABLET ORAL at 17:08

## 2020-01-01 RX ADMIN — LEVOTHYROXINE SODIUM 75 MCG: 75 TABLET ORAL at 08:12

## 2020-01-01 RX ADMIN — METOPROLOL TARTRATE 25 MG: 25 TABLET, FILM COATED ORAL at 20:39

## 2020-01-01 RX ADMIN — METOPROLOL TARTRATE 5 MG: 5 INJECTION INTRAVENOUS at 15:18

## 2020-01-01 RX ADMIN — METOPROLOL TARTRATE 25 MG: 25 TABLET, FILM COATED ORAL at 20:59

## 2020-01-01 RX ADMIN — LOSARTAN POTASSIUM 50 MG: 50 TABLET, FILM COATED ORAL at 12:27

## 2020-01-01 RX ADMIN — LEVOTHYROXINE SODIUM 75 MCG: 75 TABLET ORAL at 08:21

## 2020-01-01 RX ADMIN — NITROFURANTOIN MONOHYDRATE/MACROCRYSTALLINE 100 MG: 25; 75 CAPSULE ORAL at 08:21

## 2020-01-01 RX ADMIN — NITROFURANTOIN MONOHYDRATE/MACROCRYSTALLINE 100 MG: 25; 75 CAPSULE ORAL at 20:58

## 2020-01-01 RX ADMIN — DILTIAZEM HYDROCHLORIDE 120 MG: 120 CAPSULE, COATED, EXTENDED RELEASE ORAL at 09:43

## 2020-01-01 RX ADMIN — PHYTONADIONE 2.5 MG: 10 INJECTION, EMULSION INTRAMUSCULAR; INTRAVENOUS; SUBCUTANEOUS at 12:28

## 2020-01-01 RX ADMIN — LOSARTAN POTASSIUM 50 MG: 50 TABLET, FILM COATED ORAL at 08:11

## 2020-01-01 RX ADMIN — LORAZEPAM 1 MG: 1 TABLET ORAL at 02:10

## 2020-01-01 RX ADMIN — LORAZEPAM 1 MG: 1 TABLET ORAL at 22:09

## 2020-01-01 RX ADMIN — ALLOPURINOL 100 MG: 100 TABLET ORAL at 12:28

## 2020-01-01 RX ADMIN — WATER: 1 INJECTION INTRAMUSCULAR; INTRAVENOUS; SUBCUTANEOUS at 00:57

## 2020-01-01 RX ADMIN — Medication 400 MG: at 09:43

## 2020-01-01 RX ADMIN — NITROFURANTOIN MONOHYDRATE/MACROCRYSTALLINE 100 MG: 25; 75 CAPSULE ORAL at 08:12

## 2020-01-01 RX ADMIN — MORPHINE SULFATE 2 MG: 2 INJECTION, SOLUTION INTRAMUSCULAR; INTRAVENOUS at 16:06

## 2020-01-01 RX ADMIN — SODIUM CHLORIDE 500 ML: 9 INJECTION, SOLUTION INTRAVENOUS at 18:52

## 2020-01-01 RX ADMIN — ALLOPURINOL 100 MG: 100 TABLET ORAL at 20:58

## 2020-01-01 RX ADMIN — Medication 400 MG: at 08:21

## 2020-01-01 RX ADMIN — DEXAMETHASONE SODIUM PHOSPHATE 6 MG: 4 INJECTION, SOLUTION INTRAMUSCULAR; INTRAVENOUS at 01:01

## 2020-01-01 RX ADMIN — DILTIAZEM HYDROCHLORIDE 120 MG: 120 CAPSULE, COATED, EXTENDED RELEASE ORAL at 12:28

## 2020-01-01 RX ADMIN — REMDESIVIR 100 MG: 100 INJECTION, POWDER, LYOPHILIZED, FOR SOLUTION INTRAVENOUS at 00:21

## 2020-01-01 RX ADMIN — ATORVASTATIN CALCIUM 10 MG: 10 TABLET, FILM COATED ORAL at 20:39

## 2020-01-01 RX ADMIN — METOPROLOL TARTRATE 25 MG: 25 TABLET, FILM COATED ORAL at 09:42

## 2020-01-01 RX ADMIN — ALLOPURINOL 100 MG: 100 TABLET ORAL at 08:21

## 2020-01-01 RX ADMIN — ALLOPURINOL 100 MG: 100 TABLET ORAL at 08:12

## 2020-01-01 ASSESSMENT — ENCOUNTER SYMPTOMS
CONSTIPATION: 0
CHOKING: 0
TROUBLE SWALLOWING: 0
COLOR CHANGE: 0
VOICE CHANGE: 0
PHOTOPHOBIA: 0
EYES NEGATIVE: 1
SORE THROAT: 0
PHOTOPHOBIA: 0
CHEST TIGHTNESS: 0
VOMITING: 0
EYE REDNESS: 0
EYE REDNESS: 0
APNEA: 0
BLOOD IN STOOL: 0
NAUSEA: 0
ABDOMINAL DISTENTION: 0
FACIAL SWELLING: 0
EYE REDNESS: 0
SHORTNESS OF BREATH: 0
EYE PAIN: 0
ABDOMINAL PAIN: 0
SINUS PRESSURE: 0
SORE THROAT: 0
EYE REDNESS: 0
EYE DISCHARGE: 0
CHEST TIGHTNESS: 0
BACK PAIN: 0
VOICE CHANGE: 0
NAUSEA: 0
NAUSEA: 0
ABDOMINAL DISTENTION: 0
SINUS PRESSURE: 0
ABDOMINAL PAIN: 0
PHOTOPHOBIA: 0
WHEEZING: 0
SORE THROAT: 0
COUGH: 0
RHINORRHEA: 0
ABDOMINAL PAIN: 0
ABDOMINAL DISTENTION: 0
DIARRHEA: 0
EYES NEGATIVE: 1
COUGH: 0
EYE ITCHING: 0
BACK PAIN: 0
SORE THROAT: 0
RECTAL PAIN: 0
SINUS PRESSURE: 0
VOMITING: 0
VOMITING: 0
TROUBLE SWALLOWING: 0
RECTAL PAIN: 0
CONSTIPATION: 0
CONSTIPATION: 0
WHEEZING: 0
TROUBLE SWALLOWING: 0
TROUBLE SWALLOWING: 0
SHORTNESS OF BREATH: 0
SHORTNESS OF BREATH: 0
EYE REDNESS: 0
SORE THROAT: 0
SHORTNESS OF BREATH: 0
BLOOD IN STOOL: 0
EYE PAIN: 0
SHORTNESS OF BREATH: 0
VOMITING: 0
EYE DISCHARGE: 0
SHORTNESS OF BREATH: 0
SORE THROAT: 0
EYE DISCHARGE: 0
DIARRHEA: 0
STRIDOR: 0
COUGH: 0
BACK PAIN: 1
EYE DISCHARGE: 0
APNEA: 0
COUGH: 0
RHINORRHEA: 1
COLOR CHANGE: 0
CHOKING: 0
BLOOD IN STOOL: 0
NAUSEA: 0
PHOTOPHOBIA: 0
STRIDOR: 0
EYE ITCHING: 0
COUGH: 0
DIARRHEA: 0
ABDOMINAL PAIN: 0
NAUSEA: 0
PHOTOPHOBIA: 0
STRIDOR: 0
SINUS PRESSURE: 0
DIARRHEA: 0
SORE THROAT: 0
SHORTNESS OF BREATH: 0
COUGH: 0
DIARRHEA: 1
CONSTIPATION: 0
EYE DISCHARGE: 0
FACIAL SWELLING: 0
BACK PAIN: 0
SHORTNESS OF BREATH: 0
VOICE CHANGE: 0
ANAL BLEEDING: 0
EYE PAIN: 0
VOMITING: 0
COLOR CHANGE: 0
DIARRHEA: 0
RECTAL PAIN: 0
COUGH: 0
BACK PAIN: 0
RHINORRHEA: 0
SINUS PAIN: 0
ANAL BLEEDING: 0
RHINORRHEA: 0
SINUS PAIN: 0
EYE REDNESS: 0
VISUAL CHANGE: 0
DIARRHEA: 0
WHEEZING: 0
SHORTNESS OF BREATH: 1
SORE THROAT: 0
SINUS PAIN: 0
NAUSEA: 0
DIARRHEA: 1
ABDOMINAL PAIN: 0
ANAL BLEEDING: 0
APNEA: 0
VOMITING: 0
RECTAL PAIN: 0
CHEST TIGHTNESS: 0
BLOOD IN STOOL: 0
ANAL BLEEDING: 0
EYE DISCHARGE: 0
EYE ITCHING: 0
EYE ITCHING: 0
VOMITING: 0
WHEEZING: 0
COUGH: 0
WHEEZING: 0
SINUS PRESSURE: 0
STRIDOR: 0
VOICE CHANGE: 0
BLOOD IN STOOL: 0
FACIAL SWELLING: 0
NAUSEA: 0
EYE PAIN: 0
SINUS PAIN: 0
FACIAL SWELLING: 0
ABDOMINAL PAIN: 0
CHOKING: 0
COLOR CHANGE: 0
CHEST TIGHTNESS: 0
ABDOMINAL DISTENTION: 0
CHEST TIGHTNESS: 0
ABDOMINAL DISTENTION: 0
EYE PAIN: 0
RHINORRHEA: 0
CHOKING: 0
APNEA: 0
FACIAL SWELLING: 0

## 2020-01-01 ASSESSMENT — PATIENT HEALTH QUESTIONNAIRE - PHQ9
SUM OF ALL RESPONSES TO PHQ9 QUESTIONS 1 & 2: 0
1. LITTLE INTEREST OR PLEASURE IN DOING THINGS: 0
2. FEELING DOWN, DEPRESSED OR HOPELESS: 0
SUM OF ALL RESPONSES TO PHQ QUESTIONS 1-9: 0
SUM OF ALL RESPONSES TO PHQ QUESTIONS 1-9: 0

## 2020-01-05 NOTE — ED PROVIDER NOTES
opening twice a week for lichen sclerosis 30 g 1    warfarin (COUMADIN) 5 MG tablet Take 1 tablet daily on Tuesdays AND Fridays only and take 4 mg (2 of the 2 mg tablets) all other days of the week or as directed. Managed by [facility] 180 tablet 3    warfarin (COUMADIN) 2 MG tablet Take 2 tablets on Sundays, Mondays, Wednesdays, Thursdays, and Saturdays (total of 4 mg) and 1 of the 5 mg tablets on Tuesdays and Fridays or as directed. Managed by [facility] 180 tablet 3    losartan (COZAAR) 50 MG tablet Take ONE-HALF tablet by mouth 2 (TWO) times daily 180 tablet 3    magnesium oxide (MAG-OX) 400 MG tablet Take 1 tablet by mouth daily 30 tablet 11    metoprolol tartrate (LOPRESSOR) 25 MG tablet TAKE 1 TABLET BY MOUTH TWICE DAILY 180 tablet 3    atorvastatin (LIPITOR) 10 MG tablet TAKE 1 TABLET BY MOUTH NIGHTLY 90 tablet 3    diltiazem (CARTIA XT) 120 MG extended release capsule TAKE 1 CAPSULE BY MOUTH EVERY DAY 90 capsule 3    Cholecalciferol (VITAMIN D3) 2000 units TABS Take 1 tablet by mouth daily       acetaminophen (TYLENOL) 500 MG tablet Take 500-1,000 mg by mouth every 6 hours as needed for Pain      ARTIFICIAL TEAR SOLUTION OP Apply 2 drops to eye 2 times daily as needed (dry eyes)       aspirin 81 MG EC tablet Take 81 mg by mouth daily.       docusate sodium (COLACE) 100 MG capsule Take 100-200 mg by mouth daily as needed for Constipation          ALLERGIES    Allergies   Allergen Reactions    Codeine Other (See Comments)     Pt \"doesn't sleep\" when she takes this medication       FAMILY HISTORY    Family History   Problem Relation Age of Onset    Heart Disease Brother        SOCIAL HISTORY    Social History     Socioeconomic History    Marital status:      Spouse name: None    Number of children: None    Years of education: None    Highest education level: None   Occupational History    None   Social Needs    Financial resource details)  Nasal bleeding was unable to be stopped with pressure. Nasal packing was applied as noted above. Patient was stable on discharge. FINAL IMPRESSION    1.  Epistaxis  2. Summation      Patient Course: Nasal packing was applied in the ER. Patient was stable on discharge. ED Medications administered this visit:  Medications - No data to display    New Prescriptions from this visit:    Current Discharge Medication List          Follow-up:  Janae Rodrigues MD  [facility]  [phone]    Call in 1 day          Final Impression:   1.  Epistaxis               (Please note that portions of this note were completed with a voice recognition program.  Efforts were made to edit the dictations but occasionally words are mis-transcribed.)     Yoav Kapoor MD  01/05/20 [id number]       Yoav Kapoor MD  01/05/20 [id number]

## 2020-01-06 NOTE — PROGRESS NOTES
[facility] [address] [place] [address] [facility]  [address] [address]  Dept: MD Fredrick Tovar PA-C Dany [place] 80 y.o. female     Patient presents with a chief complaint of New Patient (Patient presents today for epistaxis. Nasal rocket placed in ER yesterday. Patient is on Coumadin. ) and Epistaxis       /72 (Site: Left Upper Arm, Position: Sitting, Cuff Size: Medium Adult)   Pulse 72   Temp 97.5 °F (36.4 °C)   Resp 18   Ht 5' 2\" (1.575 m)   Wt 143 lb (64.9 kg)   SpO2 94%   BMI 26.16 kg/m²       History of Presenting Illness: The patient/caregiver reports a history of complaint with the following features: Onset:  2-3 months ago  Believes last nose bleed prior to this one was on 12/23/19 (ED visit at that time). ED visit yesterday for nasal bleeding. Timing:   Denies overt triggers. Will be sleeping when it starts or is not paying attention when it starts. Associated/recent illness (like URI)? Denies  Associated trauma? Denies  Duration:  Pt unable to say duration. Pt can't say how long nose bled for before it stopped yesterday. Quality:  Nasal bleeding  Location:    When Pt notices the bleeding it seems like it comes out of both sides. Will be sleeping when it starts or is not paying attention when it starts so she can't say if it starts on one or both sides. Right side of nose at ED visit yesterday (currently with Rapid Rhino in place on the right side). Believes this is the only time she has ever had nasal packing in her nose. Severity:   Prior ED visits for nasal bleeding? Yes  ED visits 12/23/19 and 1/05/2020. Doesn't thinks she has had any other ED visits for epistaxis. Associated symptoms/other symptoms:   Nasal dryness? Denies  Nasal crusting? Denies  Nasal congestion? Denies  Nasal itching? Doesn't seem to be per Pt. Excessive sneezing? Yes    Hematuria? midportion. Unremarkable left anterior descending &circumflex. Normal left ventricular function,ejection fraction of 60%. Normal left ventricular function, ejection fraction of 60%. Severe aortic stenosis with a 100 mm gradient across the aortic valve with an aortic valve area of    CARDIAC CATHETERIZATION Left 14    0.4 cm2. Moderate pulmonary hypertension with a pulmonary artery pressure of 50/30.     CARPAL TUNNEL RELEASE      bilateral     HYSTERECTOMY      PARTIAL HYSTERECTOMY        Social History     Socioeconomic History    Marital status:      Spouse name: Not on file    Number of children: Not on file    Years of education: Not on file    Highest education level: Not on file   Occupational History    Not on file   Social Needs    Financial resource strain: Not on file    Food insecurity:     Worry: Not on file     Inability: Not on file    Transportation needs:     Medical: Not on file     Non-medical: Not on file   Tobacco Use    Smoking status: Former Smoker     Packs/day: 1.00     Years: 35.00     Pack years: 35.00     Types: Cigarettes     Last attempt to quit: 1982     Years since quittin.4    Smokeless tobacco: Never Used   Substance and Sexual Activity    Alcohol use: No     Alcohol/week: 0.0 standard drinks    Drug use: No    Sexual activity: Not on file   Lifestyle    Physical activity:     Days per week: Not on file     Minutes per session: Not on file    Stress: Not on file   Relationships    Social connections:     Talks on phone: Not on file     Gets together: Not on file     Attends Scientologist service: Not on file     Active member of club or organization: Not on file     Attends meetings of clubs or organizations: Not on file     Relationship status: Not on file    Intimate partner violence:     Fear of current or ex partner: Not on file     Emotionally abused: Not on file     Physically abused: Not on file     Forced sexual activity: Not on file   Other Topics Concern    Not on file   Social History Narrative    Not on file     Family History   Problem Relation Age of Onset    Heart Disease Brother         PHYSICAL EXAM:    The patient was examined today 1/6/2020 with findings as follows:    CONSTITUTIONAL:    General Appearance: well-appearing, nontoxic, alert, no acute distress     Communication: understanding at normal conversational tones, normal voicing, speech intelligible    HEAD/FACE:    Head: atraumatic, normocephalic    Facial Inspection: no lesions, healthy skin    Facial Strength: motor strength normal, symmetric strength, symmetric movement    Sinuses: no sinus tenderness (ethmoid, frontal, maxillary)    Salivary Glands: no enlargement of parotid glands, no tenderness of parotid glands, no masses of parotid glands, no enlargement of submandibular glands, no tenderness of submandibular glands, no masses of submandibular glands    EYES:  PERRL, extra-ocular movements intact, no nystagmus, sclera white, no redness of eyes, no watering of eyes    EARS:    Bilateral External Ears: no pits, no tags    Right External Ear: normally formed, no lesions; no mastoid tenderness, redness or swelling    Left External Ear: normally formed, no lesions; no mastoid tenderness, redness or swelling    Right External Auditory Canal: normally formed, no redness, no swelling, healthy skin, no obstructing cerumen, no discharge    Left External Auditory Canal: normally formed, no redness, no swelling, healthy skin, no obstructing cerumen, no discharge    Right Tympanic Membrane: normal landmarks, translucent, mobile to pneumatic otoscopy, no perforation, no effusion, no hemotympanum    Left Tympanic Membrane: normal landmarks, translucent, mobile to pneumatic otoscopy, no perforation, no effusion, no hemotympanum    Hearing: intact to spoken voice    NOSE:  Rhino Rocket in place on right side of nose. Rhino Rocket not removed today.     Nasal Skin: no lesions,

## 2020-01-06 NOTE — PATIENT INSTRUCTIONS
Apply over-the-counter antibiotic ointment to nasal mucosa once to twice daily. Use sterile or distilled water when doing nasal/sinus saline rinses. NASAL SALINE (SALTWATER) RINSES     Your doctor has recommended the use of saline (salt water) nasal rinses. Nasal rinses have been used for centuries for the treatment of nasal and sinus infection, bothersome secretions, allergy, and nasal dryness. It is safe and effective for use in both children and adults. Daily rinsing of the nasal passages with saline promotes nasal and sinus health by washing away dried mucus, infected secretions, dust, pollen, and mold spores, by moisturizing the nasal lining, promoting normal mucus flow, and naturally decongesting inflamed tissues. Rinses also help with clearing dried blood, mucus, and infection after sinus surgery and promote rapid healing of tissue in this setting. In the beginning it is normal to have some difficulty with performing nasal rinses, but with practice, most people find that it becomes part of their normal routine just as much as brushing teeth or showering, and often, most wouldnt think of leaving home without it. RINSE INGREDIENTS:  No special tools are required for nasal saline rinses, and everything that you need can be picked up at your local pharmacy and grocery. Commercial saline rinses and syringe systems such as NeilMed SINUS RINSE, Ayr Saline Nasal Rinse, or Neti Pot are available, but these may contain preservatives or other irritants, and it is often less expensive and more convenient to make it at home.   You will need the following:  Distilled water (tap water contains irritating minerals and bacteria, but may be used if boiled)  Kosher, pickling, or non-iodized table salt (regular table salt contains iodine, an irritant)  Baking soda (not baking powder)  Corn syrup (optional, for additional moisturizing effect)  White vinegar (for antiseptic effect, if recommended by your doctor)  An airtight container for mixing saline rinse  Rubber bulb syringe (like those used to clear infant noses and ears)  Household bleach (for cleaning container and bulb syringe)    PREPARATION:  To prepare the rinses, measure out 1 quart (1 liter) of distilled or boiled tap water into the mixing container. Then add 2-3 heaping teaspoons of salt and 1 teaspoon of baking soda, mix to dissolve, and place in refrigerator for 1-2 days of storage. You may add 3-4 tablespoons of corn syrup if you experience nasal dryness. If your doctor recommends it, add 1 teaspoon of white vinegar to this mixture as well. STORAGE & CLEANING:  It is advised that you make up fresh rinse every day or two, and that you keep the unused rinse in the refrigerator in an airtight container to prevent bacterial growth. Set out enough rinse for your next use well in advance to let it come to room temperature before use. Wash the container and bulb syringe at least once a week in a quart of water with 2 tablespoons of bleach, rinse and dry to prevent bacterial growth    USING SALINE RINSES:  To perform nasal saline rinses, plan on using at least 1 pint (2 cups) twice daily. Lean over a sink or other basin (some people prefer to do this in the shower as it can be messy, especially when you first start) to catch the rinse as it drains from your nose and mouth. Fill the bulb syringe with the rinse solution and place it into the nostril on one side. Gently squeeze the bulb to flush the nasal passage until the rinse drains clear. Repeat on the other side. You should plan on using the rinses twice a day, which should take about 10 minutes to perform. OTHER MEDICATIONS:  If your doctor has prescribed other nasal spray medications, such as a nasal steroid, it is best to apply these after the nasal rinse so that you do not wash the medication away.   It is also safe to continue with your other antihistamine or decongestant medications that your doctor may have prescribed in addition to the saline rinses. If you have an allergy or adverse reaction to any of the ingredients do not use it in the preparation of the saline rinses. WHEN TO CALL US:  Stop the rinses and notify your doctor if you experience severe pain in the nose or ears, bleeding, or any other problems with the use of the nasal saline rinses. It is normal, especially in the beginning, to experience drainage of saline and nasal secretions into the throat. This is best avoided by holding your breath and leaning forward when using the rinses. Experienced users often will have the rinse exit the opposite nostril without drainage into the throat at all, and this can be achieved by most people with practice. Please call us if you have any additional questions or concerns. NOTICE:  THIS DOCUMENT IS INTENDED SOLELY FOR PATIENT EDUCATIONAL PURPOSES AND IS PROVIDED AS A COURTESY TO PATIENTS OF DR. Fatih Buchanan MD AND Hollie Givens PA-C. IT IS NOT INTENDED AS A SUBSTITUTE FOR PROFESSIONAL MEDICAL CARE OR ADVICE. THE PATIENT SHOULD SEEK ADVICE FROM THE PHYSICIAN IF THERE ARE ANY QUESTIONS ABOUT THE CONTENTS OR DIRECTIONS PROVIDED IN THIS DOCUMENT.

## 2020-01-06 NOTE — PROGRESS NOTES
Review of Systems   Constitutional: Negative for activity change, appetite change, chills, diaphoresis, fatigue, fever and unexpected weight change. HENT: Positive for congestion, ear pain, hearing loss and nosebleeds. Negative for dental problem, drooling, ear discharge, facial swelling, mouth sores, postnasal drip, rhinorrhea, sinus pressure, sinus pain, sneezing, sore throat, tinnitus, trouble swallowing and voice change. Eyes: Positive for visual disturbance. Negative for photophobia, pain, discharge, redness and itching. Respiratory: Negative for apnea, cough, choking, chest tightness, shortness of breath, wheezing and stridor. Cardiovascular: Negative for chest pain, palpitations and leg swelling. Gastrointestinal: Negative for abdominal distention, abdominal pain, anal bleeding, blood in stool, constipation, diarrhea, nausea, rectal pain and vomiting. Endocrine: Negative for cold intolerance, heat intolerance, polydipsia, polyphagia and polyuria. Genitourinary: Negative for decreased urine volume, difficulty urinating, dyspareunia, dysuria, enuresis, flank pain, frequency, genital sores, hematuria, menstrual problem, pelvic pain, urgency, vaginal bleeding, vaginal discharge and vaginal pain. Musculoskeletal: Negative for arthralgias, back pain, gait problem, joint swelling, myalgias, neck pain and neck stiffness. Skin: Negative for color change, pallor, rash and wound. Allergic/Immunologic: Negative for environmental allergies, food allergies and immunocompromised state. Neurological: Positive for headaches. Negative for dizziness, tremors, seizures, syncope, facial asymmetry, speech difficulty, weakness, light-headedness and numbness. Hematological: Negative for adenopathy. Bruises/bleeds easily. Psychiatric/Behavioral: Negative for agitation, behavioral problems, confusion, decreased concentration, dysphoric mood, hallucinations, self-injury, sleep disturbance and suicidal ideas.

## 2020-01-08 NOTE — PROGRESS NOTES
only time she has ever had nasal packing in her nose. Severity:   Prior ED visits for nasal bleeding? Yes  ED visits 12/23/19 and 1/05/2020. Doesn't thinks she has had any other ED visits for epistaxis. Associated symptoms/other symptoms:   Nasal dryness? Denies  Nasal crusting? Denies  Nasal congestion? Denies  Nasal itching? Doesn't seem to be per Pt. Excessive sneezing? Yes    Hematuria? Denies  Hematochezia? Denies  Black/tarry stool? Denies    Risk factors/other information:   Hx of nasal injury? Denies  Hx of nasal or sinus surgery? Denies  Excessive blowing, rubbing or itching? Sneezes a lot and was blowing nose a lot. Not blowing nose a lot now. Is on warfarin. Also on 81 mg of ASA daily. Using a humidifier in sleep area? Daughter just bought her one. Just started using it. Is using it in bedroom and \"front room\". Former cigarette smoker. Hx of environmental/seasonal allergies? Denies  Platelet count 489 yesterday. INR 2.2 yesterday. PTT 32.6 yesterday. /72 today. Denies uncontrolled HTN. What has been tried? Outcome? Has gotten it to stop on her own at home before. Pt tried putting towel over nose and cotton up nose before getting evaluated at ED. Seemed to stop it a little bit yesterday, but not enough so went to ED. Pt thinks the bleeding may have stopped prior to getting the [employer]. ED visit note indicates that placement of nasal packing stopped the bleeding. Aggravating factors:  Nothing        Review of systems covering 10 systems is reviewed as staff entry in other note and pertinent positives and negatives noted.     Past Medical History:   Diagnosis Date    Acute renal failure (ARF) ([facility] [address]     7/2014    Anemia     Aortic stenosis, severe     5/2014 echo    Arthritis     right shoulder    H/O aortic valve replacement 7/29/14    [facility] Dr. Murali Vu Hx of CABG 7/29/14    Hypertension     Hypothyroidism     Osteoporosis        Current Past Surgical History:   Procedure Laterality Date    AORTIC VALVE REPLACEMENT  14    [facility], Dr. Ivis Bingham Left 14    [facility] Dr. Zo Lopez 60% disease in the ostium of the right coronary artery followed by a 60% lesion in the midportion. Unremarkable left anterior descending &circumflex. Normal left ventricular function,ejection fraction of 60%. Normal left ventricular function, ejection fraction of 60%. Severe aortic stenosis with a 100 mm gradient across the aortic valve with an aortic valve area of    CARDIAC CATHETERIZATION Left 14    0.4 cm2. Moderate pulmonary hypertension with a pulmonary artery pressure of 50/30.     CARPAL TUNNEL RELEASE      bilateral     HYSTERECTOMY      PARTIAL HYSTERECTOMY        Social History     Socioeconomic History    Marital status:      Spouse name: Not on file    Number of children: Not on file    Years of education: Not on file    Highest education level: Not on file   Occupational History    Not on file   Social Needs    Financial resource strain: Not on file    Food insecurity:     Worry: Not on file     Inability: Not on file    Transportation needs:     Medical: Not on file     Non-medical: Not on file   Tobacco Use    Smoking status: Former Smoker     Packs/day: 1.00     Years: 35.00     Pack years: 35.00     Types: Cigarettes     Last attempt to quit: 1982     Years since quittin.4    Smokeless tobacco: Never Used   Substance and Sexual Activity    Alcohol use: No     Alcohol/week: 0.0 standard drinks    Drug use: No    Sexual activity: Not on file   Lifestyle    Physical activity:     Days per week: Not on file     Minutes per session: Not on file    Stress: Not on file   Relationships    Social connections:     Talks on phone: Not on file     Gets together: Not on file     Attends Mormonism service: Not on file     Active member of club or organization: well although no particular bleeding source of septum identified at this time. Pt advised that in the future she may need cauterization of septal area. Plan is to re-evaluate cautery site in 2 weeks. The patient and/or caregiver is to notify the office if no improvement or worsening of symptoms is noted prior to the scheduled follow-up for sooner evaluation. The patient and/or caregiver is able to state an understanding of these recommendations and is agreeable to the treatment plan. Diagnosis Orders   1. Epistaxis     2.  Long term current use of anticoagulant therapy

## 2020-01-21 NOTE — PROGRESS NOTES
Fingerstick INR drawn per clinic protocol. Patient states no visible blood in urine and no black tarry stool. However, Mikhail Kent says she had a \"real bad\" nosebleed from the right nare and came to the ER on 1-5-2020. The nare was packed and then she followed up with Dr. Karly Morin, ENT on 1-6-2020 and 1-8-2020. An area of the right nare was cauterized and will be re-evaluated again tomorrow. Mikhail Kent says she is using a humidifier at home, but I have recommended that she start using a nasal saline moisturizing spray once daily, especially during the winter months, to minimize the risk for nasal dryness. She says that Dr. Karly Morin also recommended using \"vaseline\" or topical antibiotic ointment to both nares. Denies any missed doses of warfarin. No change in other maintenance medications or in diet. Given her therapeutic INR todaym, we will continue current warfarin regimen and recheck INR in 5 weeks. Patient acknowledges working in consult agreement with pharmacist as referred by his/her physician.

## 2020-01-22 NOTE — PROGRESS NOTES
Review of Systems   Constitutional: Negative for activity change, appetite change, chills, diaphoresis, fatigue, fever and unexpected weight change. HENT: Negative for congestion, dental problem, drooling, ear discharge, ear pain, facial swelling, hearing loss, mouth sores, nosebleeds, postnasal drip, rhinorrhea, sinus pressure, sinus pain, sneezing, sore throat, tinnitus, trouble swallowing and voice change. Eyes: Negative for photophobia, pain, discharge, redness, itching and visual disturbance. Respiratory: Negative for apnea, cough, choking, chest tightness, shortness of breath, wheezing and stridor. Cardiovascular: Negative for chest pain, palpitations and leg swelling. Gastrointestinal: Negative for abdominal distention, abdominal pain, anal bleeding, blood in stool, constipation, diarrhea, nausea, rectal pain and vomiting. Endocrine: Negative for cold intolerance, heat intolerance, polydipsia, polyphagia and polyuria. Genitourinary: Negative for decreased urine volume, difficulty urinating, dyspareunia, dysuria, enuresis, flank pain, frequency, genital sores, hematuria, menstrual problem, pelvic pain, urgency, vaginal bleeding, vaginal discharge and vaginal pain. Musculoskeletal: Negative for arthralgias, back pain, gait problem, joint swelling, myalgias, neck pain and neck stiffness. Skin: Negative for color change, pallor, rash and wound. Allergic/Immunologic: Negative for environmental allergies, food allergies and immunocompromised state. Neurological: Negative for dizziness, tremors, seizures, syncope, facial asymmetry, speech difficulty, weakness, light-headedness, numbness and headaches. Hematological: Negative for adenopathy. Does not bruise/bleed easily. Psychiatric/Behavioral: Negative for agitation, behavioral problems, confusion, decreased concentration, dysphoric mood, hallucinations, self-injury, sleep disturbance and suicidal ideas.  The patient is not nervous/anxious

## 2020-01-22 NOTE — PROGRESS NOTES
(TEMOVATE) 0.05 % ointment, Apply fingertip amount to perineal/vaginal opening twice a week for lichen sclerosis, Disp: 30 g, Rfl: 1    warfarin (COUMADIN) 5 MG tablet, Take 1 tablet daily on Tuesdays AND Fridays only and take 4 mg (2 of the 2 mg tablets) all other days of the week or as directed. Managed by [facility], Disp: 180 tablet, Rfl: 3    warfarin (COUMADIN) 2 MG tablet, Take 2 tablets on Sundays, Mondays, Wednesdays, Thursdays, and Saturdays (total of 4 mg) and 1 of the 5 mg tablets on Tuesdays and Fridays or as directed. Managed by [facility], Disp: 180 tablet, Rfl: 3    losartan (COZAAR) 50 MG tablet, Take ONE-HALF tablet by mouth 2 (TWO) times daily, Disp: 180 tablet, Rfl: 3    magnesium oxide (MAG-OX) 400 MG tablet, Take 1 tablet by mouth daily, Disp: 30 tablet, Rfl: 11    metoprolol tartrate (LOPRESSOR) 25 MG tablet, TAKE 1 TABLET BY MOUTH TWICE DAILY, Disp: 180 tablet, Rfl: 3    atorvastatin (LIPITOR) 10 MG tablet, TAKE 1 TABLET BY MOUTH NIGHTLY, Disp: 90 tablet, Rfl: 3    diltiazem (CARTIA XT) 120 MG extended release capsule, TAKE 1 CAPSULE BY MOUTH EVERY DAY, Disp: 90 capsule, Rfl: 3    Cholecalciferol (VITAMIN D3) 2000 units TABS, Take 1 tablet by mouth daily , Disp: , Rfl:     acetaminophen (TYLENOL) 500 MG tablet, Take 500-1,000 mg by mouth every 6 hours as needed for Pain, Disp: , Rfl:     ARTIFICIAL TEAR SOLUTION OP, Apply 2 drops to eye 2 times daily as needed (dry eyes) , Disp: , Rfl:     aspirin 81 MG EC tablet, Take 81 mg by mouth daily. , Disp: , Rfl:     docusate sodium (COLACE) 100 MG capsule, Take 100-200 mg by mouth daily as needed for Constipation , Disp: , Rfl:    Allergies   Allergen Reactions    Codeine Other (See Comments)     Pt \"doesn't sleep\" when she takes this medication      Past Surgical History:   Procedure Laterality Date    AORTIC VALVE REPLACEMENT  7/29/14    [facility], Dr. Trisha Barrett Fear of current or ex partner: Not on file     Emotionally abused: Not on file     Physically abused: Not on file     Forced sexual activity: Not on file   Other Topics Concern    Not on file   Social History Narrative    Not on file     Family History   Problem Relation Age of Onset    Heart Disease Brother       PHYSICAL EXAM:    The patient was examined today 1/22/2020 with findings as follows:    CONSTITUTIONAL:    General Appearance: well-appearing, nontoxic, alert, no acute distress     Communication: understanding at normal conversational tones, normal voicing, speech intelligible    HEAD/FACE:    Head: atraumatic, normocephalic, no lesions    Facial Inspection: no lesions, healthy skin    Facial Strength: motor strength normal, symmetric strength, symmetric movement, motor strength    Sinuses: no sinus tenderness    Salivary Glands: no enlargements of parotid glands, no tenderness of parotid glands, no masses of parotid glands, clear salivary flow on palpation from Stensen's ducts, no duct stones of Stensen's duct, no enlargement of submandibular glands, no tenderness of submandibular glands, no masses of submandibular glands, clear salivary flow from [place]'s ducts, no stones of [place]'s ducts    Temporomandibular Joint: no crepitus with motion, no tenderness on palpation, no trismus, motion symmetric    EYES:    Pupils: PERRLA, extra-ocular movements intact, no nystagmus, sclera white, no redness of eyes, no watering of eyes    EARS:    Bilateral External Ears: no pits, no tags    Right External Ear: normally formed, no lesions, no mastoid tenderness    Left External Ear: normally formed, no lesions, no mastoid tenderness    Right External Auditory Canal: normal, healthy skin, no obstructing cerumen, no discharge    Left External Auditory Canal: normal, healthy skin, no obstructing cerumen, no discharge    Right Tympanic Membrane: normal landmarks, translucent, mobile to pneumatic otoscopy, no perforation    Left Tympanic Membrane: normal landmarks, translucent, mobile to pneumatic otoscopy, no perforation    Hearing: intact to spoken voice, intact to finger rub    NOSE:    Nasal Skin: no lesions, no lacerations, no scars    Nasal Dorsum: symmetric with no visible or palpable deformities    Nasal Tip: normal symmetric nasal tip, normal nasal valves    Nasal Mucosa: normal, pink and moist    Septum: not markedly deformed, midline, no exposed vessels, no bleeding, no septal granuloma    Turbinates: normal size and conformation, fresh blood clot of anterior right inferior turbinate with removal resulting in brisk bleeding    Nasopharynx: normal    ORAL CAVITY/MOUTH:    Lips, teeth, gums: normal lips, normal gums, dentition intact, no dental pain on palpation    Oral Mucosa: normal, moist, no lesions    Palate: normal hard palate, normal soft palate, symmetric palatal elevation    Floor of Mouth: normal floor of mouth    Tongue: normal tongue, no lesions, no edema, no masses, normal mucosa, mobile    Tonsils: normal tonsils, symmetric, no lesions    Posterior pharynx: normal    NECK:    Neck: no masses, trachea midline, normal range of motion, no cysts or pits, no tenderness to palpation    Thyroid: normal thyroid, no enlargement, no tenderness, no nodules    LYMPH NODES:    Cervical: no palpable lymph node enlargement    SKIN:    General Appearance: no lesions, warm and dry, normal turgor, no bruising    NEUROLOGICAL SYSTEM:    Orientation: oriented to time, oriented to place, oriented to person    PSYCHIATRIC:    Mood and affect: normal mood, normal affect        CONTROL OF EPISTAXIS-COMPLEX PROCEDURE [id number])    PROCEDURE PERFORMED BY: ANALILIA JORDAN    PROCEDURE DATE: 1/22/2020    With the patients consent, the nasal cavity is examined by anterior rhinoscopy and large or multiple sites of bleeding identified at the right inferior turbinate.   With a silver nitrate stick the areas are then cauterized, followed by a cotton gauze overlay. Gentle pressure to the site is then applied until bleeding ceases. This is removed and the site examined. Multiple applications and topical thrombotic cloth application is required. Control of bleeding is achieved with cautery, application of additional hemostatic agent, or additional packing as required. The procedure is well tolerated and without complication. The patient and/or caregiver is instructed on the use of Bacitracin ointment to be applied daily and to avoid trauma during the healing process, as well as the use of anterior nasal pressure for control of bleeding in the event of recurrent bleeding. If a nasal packing was placed the patient is advised to return in five days for removal.    Assessment and Plan:    She continues with active and recurrent nasal bleeding today. Cautery for control is performed today as her medical condition precludes withholding anticoagulation therapy. The patient and/or caregiver is advised on the application of anterior nasal pressure for the control of repeat bleeding and is able to demonstrate this in the office. I have advised the use of saline nasal rinses for moisturization of the nasal cavities and an informational sheet on the preparation and use of saline is provided. We have discussed the topical application of Bacitracin or other antibiotic ointment to the anterior nasal septum twice daily as well as the maintenance of proper humidity in the home and the avoidance of nasal trauma to prevent further bleeding. The patient and/or caregiver is to notify the office if no improvement or worsening of symptoms is noted prior to the scheduled follow-up for sooner evaluation. The patient and/or caregiver is able to state an understanding of these recommendations and is agreeable to the treatment plan. Diagnosis Orders   1. Epistaxis     2. Long term current use of anticoagulant therapy     3.  Anemia, unspecified type

## 2020-02-05 NOTE — PROGRESS NOTES
Review of Systems   Constitutional: Negative for activity change, appetite change, chills, diaphoresis, fatigue, fever and unexpected weight change. HENT: Negative for congestion, dental problem, drooling, ear discharge, ear pain, facial swelling, hearing loss, mouth sores, nosebleeds, postnasal drip, rhinorrhea, sinus pressure, sinus pain, sneezing, sore throat, tinnitus, trouble swallowing and voice change. Eyes: Positive for visual disturbance. Negative for photophobia, pain, discharge, redness and itching. Respiratory: Negative for apnea, cough, choking, chest tightness, shortness of breath, wheezing and stridor. Cardiovascular: Negative for chest pain, palpitations and leg swelling. Gastrointestinal: Negative for abdominal distention, abdominal pain, anal bleeding, blood in stool, constipation, diarrhea, nausea, rectal pain and vomiting. Endocrine: Negative for cold intolerance, heat intolerance, polydipsia, polyphagia and polyuria. Genitourinary: Negative for decreased urine volume, difficulty urinating, dyspareunia, dysuria, enuresis, flank pain, frequency, genital sores, hematuria, menstrual problem, pelvic pain, urgency, vaginal bleeding, vaginal discharge and vaginal pain. Musculoskeletal: Negative for arthralgias, back pain, gait problem, joint swelling, myalgias, neck pain and neck stiffness. Skin: Negative for color change, pallor, rash and wound. Allergic/Immunologic: Negative for environmental allergies, food allergies and immunocompromised state. Neurological: Positive for dizziness and light-headedness. Negative for tremors, seizures, syncope, facial asymmetry, speech difficulty, weakness, numbness and headaches. Hematological: Negative for adenopathy. Does not bruise/bleed easily. Psychiatric/Behavioral: Negative for agitation, behavioral problems, confusion, decreased concentration, dysphoric mood, hallucinations, self-injury, sleep disturbance and suicidal ideas.  The patient is not nervous/anxious and is not hyperactive.

## 2020-02-05 NOTE — PROGRESS NOTES
 Hypothyroidism     Nosebleed     Osteoporosis        Current Outpatient Medications:     sodium chloride (OCEAN, BABY AYR) 0.65 % nasal spray, 1 spray by Nasal route as needed for Congestion, Disp: , Rfl:     allopurinol (ZYLOPRIM) 100 MG tablet, TAKE 1 TABLET BY MOUTH TWICE DAILY, Disp: 180 tablet, Rfl: 1    levothyroxine (SYNTHROID) 75 MCG tablet, TAKE 1 TABLET BY MOUTH EVERY DAY, Disp: 90 tablet, Rfl: 1    clobetasol (TEMOVATE) 0.05 % ointment, Apply fingertip amount to perineal/vaginal opening twice a week for lichen sclerosis, Disp: 30 g, Rfl: 1    warfarin (COUMADIN) 5 MG tablet, Take 1 tablet daily on Tuesdays AND Fridays only and take 4 mg (2 of the 2 mg tablets) all other days of the week or as directed. Managed by [facility], Disp: 180 tablet, Rfl: 3    warfarin (COUMADIN) 2 MG tablet, Take 2 tablets on Sundays, Mondays, Wednesdays, Thursdays, and Saturdays (total of 4 mg) and 1 of the 5 mg tablets on Tuesdays and Fridays or as directed.  Managed by [facility], Disp: 180 tablet, Rfl: 3    losartan (COZAAR) 50 MG tablet, Take ONE-HALF tablet by mouth 2 (TWO) times daily, Disp: 180 tablet, Rfl: 3    magnesium oxide (MAG-OX) 400 MG tablet, Take 1 tablet by mouth daily, Disp: 30 tablet, Rfl: 11    metoprolol tartrate (LOPRESSOR) 25 MG tablet, TAKE 1 TABLET BY MOUTH TWICE DAILY, Disp: 180 tablet, Rfl: 3    atorvastatin (LIPITOR) 10 MG tablet, TAKE 1 TABLET BY MOUTH NIGHTLY, Disp: 90 tablet, Rfl: 3    diltiazem (CARTIA XT) 120 MG extended release capsule, TAKE 1 CAPSULE BY MOUTH EVERY DAY, Disp: 90 capsule, Rfl: 3    Cholecalciferol (VITAMIN D3) 2000 units TABS, Take 1 tablet by mouth daily , Disp: , Rfl:     acetaminophen (TYLENOL) 500 MG tablet, Take 500-1,000 mg by mouth every 6 hours as needed for Pain, Disp: , Rfl:     ARTIFICIAL TEAR SOLUTION OP, Apply 2 drops to eye 2 times daily as needed (dry eyes) , Disp: , Rfl:     aspirin 81 MG EC tablet, Take 81 mg by mouth daily. , Disp: , Rfl:     docusate sodium (COLACE) 100 MG capsule, Take 100-200 mg by mouth daily as needed for Constipation , Disp: , Rfl:    Allergies   Allergen Reactions    Codeine Other (See Comments)     Pt \"doesn't sleep\" when she takes this medication      Past Surgical History:   Procedure Laterality Date    AORTIC VALVE REPLACEMENT  14    [facility], Dr. Adriane Baptiste Left 14    [facility] Dr. Valentina Collazo 60% disease in the ostium of the right coronary artery followed by a 60% lesion in the midportion. Unremarkable left anterior descending &circumflex. Normal left ventricular function,ejection fraction of 60%. Normal left ventricular function, ejection fraction of 60%. Severe aortic stenosis with a 100 mm gradient across the aortic valve with an aortic valve area of    CARDIAC CATHETERIZATION Left 14    0.4 cm2. Moderate pulmonary hypertension with a pulmonary artery pressure of 50/30.     CARPAL TUNNEL RELEASE      bilateral     HYSTERECTOMY      PARTIAL HYSTERECTOMY        Social History     Socioeconomic History    Marital status:      Spouse name: Not on file    Number of children: Not on file    Years of education: Not on file    Highest education level: Not on file   Occupational History    Not on file   Social Needs    Financial resource strain: Not on file    Food insecurity:     Worry: Not on file     Inability: Not on file    Transportation needs:     Medical: Not on file     Non-medical: Not on file   Tobacco Use    Smoking status: Former Smoker     Packs/day: 1.00     Years: 35.00     Pack years: 35.00     Types: Cigarettes     Last attempt to quit: 1982     Years since quittin.4    Smokeless tobacco: Never Used   Substance and Sexual Activity    Alcohol use: No     Alcohol/week: 0.0 standard drinks    Drug use: No    Sexual activity: Not on file   Lifestyle    Physical activity:     Days per week: Not on file     Minutes per session: Not on file    Stress: Not on file   Relationships    Social connections:     Talks on phone: Not on file     Gets together: Not on file     Attends Anabaptist service: Not on file     Active member of club or organization: Not on file     Attends meetings of clubs or organizations: Not on file     Relationship status: Not on file    Intimate partner violence:     Fear of current or ex partner: Not on file     Emotionally abused: Not on file     Physically abused: Not on file     Forced sexual activity: Not on file   Other Topics Concern    Not on file   Social History Narrative    Not on file     Family History   Problem Relation Age of Onset    Heart Disease Brother         PHYSICAL EXAM:    The patient was examined today 2/5/2020 with findings as follows:    CONSTITUTIONAL:    General Appearance: well-appearing, nontoxic, alert, no acute distress     Communication: understanding at normal conversational tones, normal voicing, speech intelligible    HEAD/FACE:    Head: atraumatic, normocephalic    Facial Inspection: no lesions, healthy skin    Facial Strength: motor strength normal, symmetric strength, symmetric movement    EYES:  Pupils equal and round, extra-ocular movements intact, no nystagmus, sclera white, no redness of eyes, no watering of eyes    NOSE:    Nasal Skin: no lesions, no lacerations, no scars    Nasal Dorsum: symmetric with no visible or palpable deformities    Nasal Tip: normal symmetric nasal tip, normal nasal valves    Nasal Mucosa: normal, pink and moist, healed cautery site(s), there is no blood at all (moist or dry)    Septum: not markedly deformed, midline, no exposed vessels, no bleeding, no septal granuloma, no perforation    Turbinates: normal size and conformation, healed cautery site of right inferior turbinate    SKIN:    General Appearance:  warm and dry, small bruise on chest (Pt show me and indicates

## 2020-02-05 NOTE — PATIENT INSTRUCTIONS
container and bulb syringe)    PREPARATION:  To prepare the rinses, measure out 1 quart (1 liter) of distilled or boiled tap water into the mixing container. Then add 2-3 heaping teaspoons of salt and 1 teaspoon of baking soda, mix to dissolve, and place in refrigerator for 1-2 days of storage. You may add 3-4 tablespoons of corn syrup if you experience nasal dryness. If your doctor recommends it, add 1 teaspoon of white vinegar to this mixture as well. STORAGE & CLEANING:  It is advised that you make up fresh rinse every day or two, and that you keep the unused rinse in the refrigerator in an airtight container to prevent bacterial growth. Set out enough rinse for your next use well in advance to let it come to room temperature before use. Wash the container and bulb syringe at least once a week in a quart of water with 2 tablespoons of bleach, rinse and dry to prevent bacterial growth    USING SALINE RINSES:  To perform nasal saline rinses, plan on using at least 1 pint (2 cups) twice daily. Lean over a sink or other basin (some people prefer to do this in the shower as it can be messy, especially when you first start) to catch the rinse as it drains from your nose and mouth. Fill the bulb syringe with the rinse solution and place it into the nostril on one side. Gently squeeze the bulb to flush the nasal passage until the rinse drains clear. Repeat on the other side. You should plan on using the rinses twice a day, which should take about 10 minutes to perform. OTHER MEDICATIONS:  If your doctor has prescribed other nasal spray medications, such as a nasal steroid, it is best to apply these after the nasal rinse so that you do not wash the medication away. It is also safe to continue with your other antihistamine or decongestant medications that your doctor may have prescribed in addition to the saline rinses.   If you have an allergy or adverse reaction to any of the ingredients do not use it in the preparation of the saline rinses. WHEN TO CALL US:  Stop the rinses and notify your doctor if you experience severe pain in the nose or ears, bleeding, or any other problems with the use of the nasal saline rinses. It is normal, especially in the beginning, to experience drainage of saline and nasal secretions into the throat. This is best avoided by holding your breath and leaning forward when using the rinses. Experienced users often will have the rinse exit the opposite nostril without drainage into the throat at all, and this can be achieved by most people with practice. Please call us if you have any additional questions or concerns. NOTICE:  THIS DOCUMENT IS INTENDED SOLELY FOR PATIENT EDUCATIONAL PURPOSES AND IS PROVIDED AS A COURTESY TO PATIENTS OF DR. Misael Ta MD AND Nico Wade PA-C. IT IS NOT INTENDED AS A SUBSTITUTE FOR PROFESSIONAL MEDICAL CARE OR ADVICE. THE PATIENT SHOULD SEEK ADVICE FROM THE PHYSICIAN IF THERE ARE ANY QUESTIONS ABOUT THE CONTENTS OR DIRECTIONS PROVIDED IN THIS DOCUMENT.

## 2020-03-16 NOTE — PROGRESS NOTES
Family History   Problem Relation Age of Onset    Heart Disease Brother           Social History     Tobacco Use    Smoking status: Former Smoker     Packs/day: 1.00     Years: 35.00     Pack years: 35.00     Types: Cigarettes     Last attempt to quit: 1982     Years since quittin.5    Smokeless tobacco: Never Used   Substance Use Topics    Alcohol use: No     Alcohol/week: 0.0 standard drinks      Current Outpatient Medications   Medication Sig Dispense Refill    nitrofurantoin, macrocrystal-monohydrate, (MACROBID) 100 MG capsule Take 1 capsule by mouth 2 times daily for 7 days 14 capsule 0    warfarin (COUMADIN) 5 MG tablet TAKE 1 TABLET BY MOUTH EVERY DAY on  only - - take FOUR mg all other days or AS DIRECTED 30 tablet 11    sodium chloride (OCEAN, BABY AYR) 0.65 % nasal spray 1 spray by Nasal route as needed for Congestion      allopurinol (ZYLOPRIM) 100 MG tablet TAKE 1 TABLET BY MOUTH TWICE DAILY 180 tablet 1    levothyroxine (SYNTHROID) 75 MCG tablet TAKE 1 TABLET BY MOUTH EVERY DAY 90 tablet 1    clobetasol (TEMOVATE) 0.05 % ointment Apply fingertip amount to perineal/vaginal opening twice a week for lichen sclerosis 30 g 1    warfarin (COUMADIN) 2 MG tablet Take 2 tablets on Sundays, , , , and  (total of 4 mg) and 1 of the 5 mg tablets on  and  or as directed.  Managed by [facility] 180 tablet 3    losartan (COZAAR) 50 MG tablet Take ONE-HALF tablet by mouth 2 (TWO) times daily 180 tablet 3    magnesium oxide (MAG-OX) 400 MG tablet Take 1 tablet by mouth daily 30 tablet 11    metoprolol tartrate (LOPRESSOR) 25 MG tablet TAKE 1 TABLET BY MOUTH TWICE DAILY 180 tablet 3    atorvastatin (LIPITOR) 10 MG tablet TAKE 1 TABLET BY MOUTH NIGHTLY 90 tablet 3    diltiazem (CARTIA XT) 120 MG extended release capsule TAKE 1 CAPSULE BY MOUTH EVERY DAY 90 capsule 3    Cholecalciferol (VITAMIN D3) 2000 units TABS Take 1 tablet by mouth daily       acetaminophen (TYLENOL) 500 MG tablet Take 500-1,000 mg by mouth every 6 hours as needed for Pain      ARTIFICIAL TEAR SOLUTION OP Apply 2 drops to eye 2 times daily as needed (dry eyes)       aspirin 81 MG EC tablet Take 81 mg by mouth daily.  docusate sodium (COLACE) 100 MG capsule Take 100-200 mg by mouth daily as needed for Constipation        No current facility-administered medications for this visit. Allergies   Allergen Reactions    Codeine Other (See Comments)     Pt \"doesn't sleep\" when she takes this medication       Health Maintenance   Topic Date Due    Shingles Vaccine (1 of 2) 01/10/1981    Lipid screen  07/16/2020    TSH testing  07/16/2020    Annual Wellness Visit (AWV)  10/04/2020    Potassium monitoring  01/05/2021    Creatinine monitoring  01/05/2021    DTaP/Tdap/Td vaccine (2 - Td) 09/05/2028    Flu vaccine  Completed    Pneumococcal 65+ years Vaccine  Completed    Hepatitis A vaccine  Aged Out    Hepatitis B vaccine  Aged Out    Hib vaccine  Aged Out    Meningococcal (ACWY) vaccine  Aged Out       Subjective:      Review of Systems   Constitutional: Negative for activity change, appetite change, chills, fever and unexpected weight change. HENT: Negative for ear pain and sore throat. Eyes: Negative. Respiratory: Negative for cough, chest tightness, shortness of breath and wheezing. Cardiovascular: Negative for chest pain, palpitations and leg swelling. Genitourinary: Positive for dysuria (occasional). Negative for difficulty urinating and hematuria. Not sexually active, burning and itching but better today. Musculoskeletal: Negative for arthralgias and joint swelling. Skin: Negative. Neurological: Negative for dizziness and headaches. Psychiatric/Behavioral: Negative for behavioral problems and sleep disturbance. Objective:     Physical Exam  Vitals signs and nursing note reviewed. Constitutional:       General: She is not in acute distress. Appearance: Normal appearance. She is well-developed and normal weight. HENT:      Head: Normocephalic and atraumatic. Right Ear: External ear normal.      Left Ear: Tympanic membrane and external ear normal.      Mouth/Throat:      Mouth: Mucous membranes are moist.   Eyes:      General: No scleral icterus. Pupils: Pupils are equal, round, and reactive to light. Neck:      Musculoskeletal: Normal range of motion and neck supple. Cardiovascular:      Rate and Rhythm: Normal rate and regular rhythm. Heart sounds: Murmur present. Pulmonary:      Effort: Pulmonary effort is normal. No respiratory distress. Breath sounds: Normal breath sounds. No wheezing. Abdominal:      Palpations: Abdomen is soft. Tenderness: There is no abdominal tenderness. Musculoskeletal: Normal range of motion. Right lower leg: No edema. Left lower leg: No edema. Lymphadenopathy:      Cervical: No cervical adenopathy. Skin:     General: Skin is warm and dry. Findings: Bruising (left achilles bruise), lesion and rash (lichen sclerosus perineum, shave folliculitis labia) present. Neurological:      Mental Status: She is alert and oriented to person, place, and time. Psychiatric:         Mood and Affect: Mood normal.         Behavior: Behavior normal.         Thought Content:  Thought content normal.         Judgment: Judgment normal.       BP (!) 152/90 (Site: Right Upper Arm)   Pulse 64   Temp 97.8 °F (36.6 °C) (Oral)   Wt 143 lb (64.9 kg)   SpO2 98%   BMI 26.16 kg/m²     Data:     Lab Results   Component Value Date     01/05/2020    K 4.0 01/05/2020     01/05/2020    CO2 26 01/05/2020    BUN 19 01/05/2020    CREATININE 0.96 01/05/2020    GLUCOSE 117 01/05/2020    GLUCOSE 96 01/09/2012    PROT 7.1 01/05/2020    LABALBU 4.3 01/05/2020    LABALBU 4.3 01/09/2012    BILITOT 0.40 01/05/2020    ALKPHOS 75

## 2020-03-26 NOTE — TELEPHONE ENCOUNTER
Last OV 10/4/19 for MAW  Requesting refills on allopurinol and levothyroxine thru sure script  No upcoming OV scheduled

## 2020-03-26 NOTE — TELEPHONE ENCOUNTER
Called patient about upcoming appt on 3/31/2020. Patient has had therapeutic INRs for the last 5 months. Patient was prescribed macrobid, which should not have an effect on INR. Left message to call back with 2 option, to come in for venipuncture INR or reschedule appt for 4 additional weeks. 1.85

## 2020-04-09 NOTE — TELEPHONE ENCOUNTER
COVID-19 phone screening     Call placed to screen patient prior to upcoming Medication Management visit for Anticoagulation. Does patient have fever and/or lower respiratory symptoms (SOB, difficulty breathing, cough)? [] Yes    [x] No    If yes, complete Travel Screening and ask the following:   [] [Fever OR s/sxs of lower respiratory illness]  AND  [close contact with lab-confirmed COVID-19 patient within 14 days of symptom onset   [] [Fever AND s/sxs of lower respiratory illness requiring hospitalization] AND [history of travel to [place], [place], [place], [facility], [place] within 14 days of sx onset]  [] [Fever with severe acute lower respiratory illness (I.e. PNA, ARDS) requiring hospitalization and without alternative explanatory diagnosis (I.e. Influenza)] AND [no source of exposure identified]    [x] None of the following; patient confirmed for regularly scheduled INR check and instructed to call the clinic immediately if any symptoms develop prior to upcoming appointment.

## 2020-05-26 NOTE — PATIENT INSTRUCTIONS
SURVEY:    You may be receiving a survey from [employer] regarding your visit today. Please complete the survey to enable us to provide the highest quality of care to you and your family. If you cannot score us a very good (5 stars) on any question, please call the office to discuss how we could have made your experience a very good one. Thank you.     Clinical Care Team:  MD Balwinder Cruz LPN    Clerical Team:  Divya Greco

## 2020-05-26 NOTE — PROGRESS NOTES
HPI Notes    Name: Gayla Alva  : 1/10/1931        Chief Complaint:     Chief Complaint   Patient presents with    Headache     Pt states she ran out of allopurinol for 3 days, Pt states she had a HA everyday that she didn't take it. Pt family is questioning why she is taking allopurinol? ? Pt states she did have trouble with gout a long time ago. Pt got a refill yesterday, HA is gone today. History of Present Illness:     Gayla Alva is a 80 y.o.  female who presents with Headache (Pt states she ran out of allopurinol for 3 days, Pt states she had a HA everyday that she didn't take it. Pt family is questioning why she is taking allopurinol? ? Pt states she did have trouble with gout a long time ago. Pt got a refill yesterday, ALLEN is gone today.)      Headache    This is a recurrent problem. Episode onset: Pt had a HA when she was younger --- \"migraine HA\" in the past.  Pt states she only had a HA for about 3d since she had stopped the allopurinol. Pt started back on the allopurinol and no HA today. The problem has been resolved (Pt states NO Headaches now. ). The quality of the pain is described as aching. Pertinent negatives include no abdominal pain, coughing, dizziness, ear pain, eye pain, eye redness, fever, loss of balance, muscle aches, nausea, phonophobia, photophobia, rhinorrhea, sinus pressure, sore throat, tingling, tinnitus, visual change, vomiting or weight loss. Gout - chronic but stable. NO red or hot or swollen joints.  Pt is back taking the allopurinol 100mg one po BID   Past Medical History:     Past Medical History:   Diagnosis Date    Acute renal failure (ARF) ([address]     2014    Anemia     Aortic stenosis, severe     2014 echo    Arthritis     right shoulder    H/O aortic valve replacement 14    [facility] Dr. Samir Schaeffer Hx of CABG 14    Hypertension     Hypothyroidism     Nosebleed     Osteoporosis       Reviewed all health maintenance

## 2020-06-04 NOTE — TELEPHONE ENCOUNTER
Patient is asking for a refill on Levothyroxine [address] Maintenance   Topic Date Due    Shingles Vaccine (1 of 2) 01/10/1981    Lipid screen  07/16/2020    TSH testing  07/16/2020    Annual Wellness Visit (AWV)  10/04/2020    Potassium monitoring  01/05/2021    Creatinine monitoring  01/05/2021    DTaP/Tdap/Td vaccine (2 - Td) 09/05/2028    Flu vaccine  Completed    Pneumococcal 65+ years Vaccine  Completed    Hepatitis A vaccine  Aged Out    Hepatitis B vaccine  Aged Out    Hib vaccine  Aged Out    Meningococcal (ACWY) vaccine  Aged Out             (applicable per patient's age: Cancer Screenings, Depression Screening, Fall Risk Screening, Immunizations)    LDL Cholesterol (mg/dL)   Date Value   07/16/2019 62     AST (U/L)   Date Value   01/05/2020 19     ALT (U/L)   Date Value   01/05/2020 9     BUN (mg/dL)   Date Value   01/05/2020 19      (goal A1C is < 7)   (goal LDL is <100) need 30-50% reduction from baseline     BP Readings from Last 3 Encounters:   05/26/20 (!) 118/50   03/16/20 (!) 152/90   02/25/20 (!) 156/72    (goal /80)      All Future Testing planned in [facility]:  Lab Frequency Next Occurrence   TSH with Reflex Once 07/22/2020   CBC Auto Differential Once 07/22/2020   Comprehensive Metabolic Panel Once 76/49/8668   Vitamin D 25 Hydroxy Once 07/22/2020   Lipid Panel Once 07/22/2020   Magnesium Once 07/22/2020   EKG 12 Lead Once 07/22/2020   XR CHEST STANDARD (2 VW) Once 07/22/2020   ECHO Complete 2D W Doppler W Color Once 07/22/2020   POCT INR         Next Visit Date:  Future Appointments   Date Time Provider Lily Marinelli   6/29/2020  8:00 AM JOSE G MEDICATION [facility] NARDA Roy   7/1/2020  9:30 AM [facility] ECHO ROOM [facility] JOSE G [facility] ECHO [facility]   7/16/2020  8:30 AM MD Wen Kendrick [facility]   10/7/2020  1:00 PM MD Lisa Pimentel [facility]            Patient Active Problem List:     Hypothyroidism     Migraine with aura

## 2020-06-09 NOTE — TELEPHONE ENCOUNTER
Pharmacy called in regards to give Mala Pardo a [address] day supply so she only needs to come to the pharmacy once for all of her meds.

## 2020-06-26 NOTE — TELEPHONE ENCOUNTER
COVID-19 phone screening     Call placed to screen patient prior to upcoming Medication Management visit for Anticoagulation. Does patient have fever and/or lower respiratory symptoms (SOB, difficulty breathing, cough)? [] Yes    [x] No    If yes, complete Travel Screening and ask the following:   [] [Fever OR s/sxs of lower respiratory illness]  AND  [close contact with lab-confirmed COVID-19 patient within 14 days of symptom onset   [] [Fever AND s/sxs of lower respiratory illness requiring hospitalization] AND [history of travel to [place], [place], [place], [facility], [place] within 14 days of sx onset]  [] [Fever with severe acute lower respiratory illness (I.e. PNA, ARDS) requiring hospitalization and without alternative explanatory diagnosis (I.e. Influenza)] AND [no source of exposure identified]    [x] None of the following; patient confirmed for regularly scheduled INR check and instructed to call the clinic immediately if any symptoms develop prior to upcoming appointment.

## 2020-06-26 NOTE — TELEPHONE ENCOUNTER
COVID-19 phone screening     Call placed to screen patient prior to upcoming Medication Management visit for Anticoagulation. Does patient have fever and/or lower respiratory symptoms (SOB, difficulty breathing, cough)? [] Yes    [x] No    If yes, complete Travel Screening and ask the following:   [] [Fever OR s/sxs of lower respiratory illness]  AND  [close contact with lab-confirmed COVID-19 patient within 14 days of symptom onset   [] [Fever AND s/sxs of lower respiratory illness requiring hospitalization] AND [history of travel to [place], [place], [place], [facility], [place] within 14 days of sx onset]  [] [Fever with severe acute lower respiratory illness (I.e. PNA, ARDS) requiring hospitalization and without alternative explanatory diagnosis (I.e. Influenza)] AND [no source of exposure identified]    [x] None of the following; patient confirmed for regularly scheduled INR check and instructed to call the clinic immediately if any symptoms develop prior to upcoming appointment.

## 2020-06-29 NOTE — PROGRESS NOTES
Fingerstick INR drawn per clinic protocol. Patient states no visible blood in urine and no black tarry stool. Denies any missed doses of warfarin. All other medications reviewed for accuracy. No change in other maintenance medications or in diet. Will continue current warfarin regimen and recheck INR in 5 weeks. Patient acknowledges working in consult agreement with pharmacist as referred by his/her physician.       CLINICAL PHARMACY CONSULT: MED RECONCILIATION/REVIEW ADDENDUM    For Pharmacy Admin Tracking Only    PHSO: No  Total # of Interventions Recommended: 0    - Maintenance Safety Lab Monitoring #: 1    Total Interventions Accepted: 0  Time Spent (min): 30    Regina Astorga, PharmD

## 2020-07-16 NOTE — LETTER
Srinivasan Bowen M.D. [address] [place] [address]  [phone]        2020        Dee Romero MD  [address] [address]    RE:   Nola Emre  :  01/10/1931    Dear Dr. Marilee Merritt:    CHIEF COMPLAINT:  1. Coronary artery disease. 2.  History of severe aortic stenosis. 3.  Bioprosthetic aortic valve replacement on 2014. HISTORY OF PRESENT ILLNESS:  I had the pleasure of seeing Mrs. Meagan Jimenez in our office on 2020. She is a very pleasant, very sharp 55-year-old female who looks younger than her stated age. She developed severe shortness of breath in 2014. Echocardiogram showed an EF of 45%, with severe aortic stenosis, with a mean gradient of 66 and an aortic valve area of 0.6 cm2, with a PA pressure of 50. I did a catheterization on 2014, that showed 60% RCA with sequential lesions, unremarkable LAD and circumflex, severe aortic stenosis, with a mean gradient of 100 and an aortic valve area of 0.4 cm2. She had an EF of 60%. She saw Dr. Destin Talavera on 2014, and had acute renal insufficiency, which subsided. She also got an abscess at the IV site, treated by antibiotics, which delayed the surgery. She had open heart surgery on 2014, with an aortic valve replacement with a #23 [address]. Nasir pericardial Trifecta bioprosthesis, with a single vein graft to the right coronary artery. She is in atrial fib flutter and has been on Coumadin. Historically, I felt that it was chronic; however, the last 2 EKGs one year ago and EKG on 2020, showed sinus rhythm with first-degree AV block and right bundle-branch block. She had severe epistaxis on 2019 and 2020. She saw Dr. Gifty Knott and had cauterization. She has had no other hospitalizations or procedures. She has had no unusual shortness of breath. She is still active at home. She was still on cloud nine as she had just seen Byron Foster from [facility]. .. She does complain of dizziness that she has had over the last several months. She describes it as the room \"spinning\" consistent with vertigo. CARDIAC RISK FACTORS:  Known CAD:  Positive. Hypertension:  Positive. Other Family Members:  Positive. Peripheral Vascular Disease:  Negative. Hyperlipidemia:  Positive. Smoking:  Negative. Diabetes:  Negative. MEDICATIONS AT HOME:  Zyloprim 100 mg b.i.d., Lipitor 10 mg daily, Temovate ointment, Cartia  mg daily, Synthroid 75 mcg daily, Cozaar 50 mg half a tablet b.i.d., magnesium 400 mg daily, Lopressor 25 mg b.i.d., Coumadin as directed. PAST MEDICAL HISTORY:  1. Emphysema. 2.  Pulmonary hypertension, with a PA pressure of 50.  3.  Partial hysterectomy. 4.  Carpal tunnel surgery. 5.  Euthyroid, on treatment. 6.  Hypertension, very labile. 7.  She has a history of atrial flutter, which I thought was chronic, although last 2 EKGs have showed sinus rhythm. 8.  Aortic valve replacement with a #23 [address] Nasir pericardial Trifecta bioprosthetic aortic valve, as stated previously. FAMILY HISTORY:  Mother had CAD. SOCIAL HISTORY:  She is 80years old, has lived with [facility] for [facility] years. Had 6 children, now 2 , second oldest son  several months ago. Her youngest son, Delfina Macias, just re- [place]). She continues to look forward to the Coumadin Clinic. As you know, patients are not allowed to come in the Coumadin Clinic; however, Halie Swan of course went to her car (I told her she must be very special as he never goes to the cars to see the patients personally. ..).  (She broke out into a sweat when I told her that Katarzynaelvira Mendozawilliams only comes to the cars of his favorite patients. ..)    REVIEW OF SYSTEMS:  Cardiac as above.   Other systems reviewed including constitutional, eyes, ears, nose and throat, cardiovascular, respiratory, GI, , musculoskeletal, integumentary, neurologic, endocrine, hematologic and allergic/immunologic, are negative except for what is described above. No weight loss or weight gain. No change in bowel habits. No blood in stools. No fevers, sweats or chills. PHYSICAL EXAMINATION:  VITAL SIGNS:  Her blood pressure was 160/70, heart rate of 68 and regular. Respiratory rate 18. O2 saturation 93%. Weight 121 pounds. GENERAL:  She is a pleasant 25-year-old female. Denied pain. She was oriented to person, place and time. Answered questions appropriately. SKIN:  No unusual skin changes. HEENT:  The pupils are equally round and intact. Mucous membranes were dry. NECK:  No JVD. Good carotid pulses. No carotid bruits. No lymphadenopathy or thyromegaly. CARDIOVASCULAR EXAM:  S1 and S2 were normal.  No S3 or S4. Soft systolic blowing type murmur. No diastolic murmur. PMI was normal.  No lift, thrust, or pericardial friction rub. LUNGS:  Clear to auscultation and percussion. ABDOMEN:  Soft and nontender. Good bowel sounds. EXTREMITIES:  Good femoral pulses. Good pedal pulses. No pedal edema. Skin was warm and dry. No calf tenderness. Nail beds pink. Good cap refill. PULSES:  Bilateral symmetrical radial, brachial and carotid pulses. No carotid bruits. Good femoral and pedal pulses. NEUROLOGIC EXAM:  Within normal limits. PSYCHIATRIC EXAM:  Within normal limits. LABORATORY DATA:  Her sodium was 140, potassium 3.9, BUN 17, creatinine 1.01, GFR was 52. Calcium was 10.4. Cholesterol 152 with an HDL of 83, LDL 55, triglycerides 70. ALT was 10, AST was 20. Glucose 105. TSH 0.69. Vitamin D 50.4. White count 6.4, hemoglobin 12.2 with a platelet count of 577,359. Her EKG showed sinus rhythm with a first-degree AV block and a right bundle-branch block, which is unchanged from last year (before that it had been always in atrial fib/flutter). Echocardiogram done on 06/30/2020, showed an EF of 60%, with severely dilated left atrium and right atrium, moderately dilated right ventricle. She had a normal-appearing and functioning bioprosthetic aortic valve, with a mean gradient of 9 mmHg. She had moderate mitral regurgitation, severe pulmonary hypertension with a PA pressure of 67 mmHg. Chest x-ray was unremarkable. IMPRESSION:  1. History of severe aortic stenosis, with an aortic valve area of 0.6 cm2, with a catheterization on 06/04/2014, that showed 60% RCA, with unremarkable LAD and circumflex, EF of 60%. 2.  Acute renal insufficiency, which resolved, for which she was taken off Indocin for her gout. 3.  Open heart surgery by Dr. Betina Mcgregor on 07/29/2014, with a vein graft to the right coronary artery and a #23 St[address] Nasir pericardial Trifecta bioprosthetic valve in the aortic position for severe aortic stenosis. 4.  Mild aortic insufficiency. 5.  PA pressure of 67 mmHg, with moderate-to-severe tricuspid regurgitation, consistent with severe pulmonary hypertension. 6.  History of atrial flutter, but the last 2 years she is in sinus rhythm. 7.  On Coumadin for her paroxysmal atrial fib/flutter. 8.  Euthyroid, on treatment. 9.  Hypertension, very labile. 10.  Hyperlipidemia, well controlled. 11.  Chronic renal insufficiency, doing well at this time with a GFR of 52. 12.  COPD. 13.  Dizziness, consistent with vertigo. PLAN:  1. Start Antivert 12.5 mg t.i.d. with her decreasing now to b.i.d. in 1 to 2 weeks as a trial.  2.  See in 1 year for an echocardiogram, with an appointment 1 week later. DISCUSSION:  Mrs. Devon Hooper overall is doing excellent. She has had no new symptoms to indicate that she needs any testing. Her echocardiogram still looks good and her aortic valve is working well. I will plan on seeing her in a year. Her biggest complaint is that of dizziness.   I did take the liberty of

## 2020-07-16 NOTE — PROGRESS NOTES
Ov DR Justo Martino 1 year follow up  With echo   No chest pain or sob  C/o dizziness   Rooms spins. For past couple months. Gets nauseated . Has headache with   Dizziness   Had headache most of   Life   When sits down dizziness  Gone still has headaches  Gets migraines also. Came to ED for epistaxis in Jan  Did see DR Leonardo Carter. Cauterized nose. Will give antivert. 2 sons passed away   In last year oldest and  Next to youngest   [de-identified] son Augie Zendejas  just got   to [facility].

## 2020-07-27 NOTE — PROGRESS NOTES
Delmi Do M.D. [address] [place] [address]  [phone]        2020        Nicholas Ramirez MD  [address] [address]    RE:   Jennifer Aguirre  :  01/10/1931    Dear Dr. Rosangela Kathleen:    CHIEF COMPLAINT:  1. Coronary artery disease. 2.  History of severe aortic stenosis. 3.  Bioprosthetic aortic valve replacement on 2014. HISTORY OF PRESENT ILLNESS:  I had the pleasure of seeing Mrs. Reymundo Chase in our office on 2020. She is a very pleasant, very sharp 70-year-old female who looks younger than her stated age. She developed severe shortness of breath in 2014. Echocardiogram showed an EF of 45%, with severe aortic stenosis, with a mean gradient of 66 and an aortic valve area of 0.6 cm2, with a PA pressure of 50. I did a catheterization on 2014, that showed 60% RCA with sequential lesions, unremarkable LAD and circumflex, severe aortic stenosis, with a mean gradient of 100 and an aortic valve area of 0.4 cm2. She had an EF of 60%. She saw Dr. Eve Rich on 2014, and had acute renal insufficiency, which subsided. She also got an abscess at the IV site, treated by antibiotics, which delayed the surgery. She had open heart surgery on 2014, with an aortic valve replacement with a #23 St. Nasir pericardial Trifecta bioprosthesis, with a single vein graft to the right coronary artery. She is in atrial fib flutter and has been on Coumadin. Historically, I felt that it was chronic; however, the last 2 EKGs one year ago and EKG on 2020, showed sinus rhythm with first-degree AV block and right bundle-branch block. She had severe epistaxis on 2019 and 2020. She saw Dr. Niki García and had cauterization. She has had no other hospitalizations or procedures. She has had no unusual shortness of breath. She is still active at home.     She was still on cloud nine as she had just seen Byron Rejiwilliams [facility]. .. She does complain of dizziness that she has had over the last several months. She describes it as the room \"spinning\" consistent with vertigo. CARDIAC RISK FACTORS:  Known CAD:  Positive. Hypertension:  Positive. Other Family Members:  Positive. Peripheral Vascular Disease:  Negative. Hyperlipidemia:  Positive. Smoking:  Negative. Diabetes:  Negative. MEDICATIONS AT HOME:  Zyloprim 100 mg b.i.d., Lipitor 10 mg daily, Temovate ointment, Cartia  mg daily, Synthroid 75 mcg daily, Cozaar 50 mg half a tablet b.i.d., magnesium 400 mg daily, Lopressor 25 mg b.i.d., Coumadin as directed. PAST MEDICAL HISTORY:  1. Emphysema. 2.  Pulmonary hypertension, with a PA pressure of 50.  3.  Partial hysterectomy. 4.  Carpal tunnel surgery. 5.  Euthyroid, on treatment. 6.  Hypertension, very labile. 7.  She has a history of atrial flutter, which I thought was chronic, although last 2 EKGs have showed sinus rhythm. 8.  Aortic valve replacement with a #23 St. Nasir pericardial Trifecta bioprosthetic aortic valve, as stated previously. FAMILY HISTORY:  Mother had CAD. SOCIAL HISTORY:  She is 80years old, has lived with [facility] for 35 years. Had 6 children, now 2 , second oldest son  several months ago. Her youngest son, Delfina Macias, just re- [facility] [place]). She continues to look forward to the [facility]. As you know, patients are not allowed to come in the [facility]; however, Halie Swan of course went to her car (I told her she must be very special as he never goes to the cars to see the patients personally. ..).  (She broke out into a sweat when I told her that Byron Foster only comes to the cars of his favorite patients. ..)    REVIEW OF SYSTEMS:  Cardiac as above.   Other systems reviewed including constitutional, eyes, ears, nose and throat, cardiovascular, respiratory, GI, , musculoskeletal, integumentary, neurologic, endocrine, hematologic and allergic/immunologic, are negative except for what is described above. No weight loss or weight gain. No change in bowel habits. No blood in stools. No fevers, sweats or chills. PHYSICAL EXAMINATION:  VITAL SIGNS:  Her blood pressure was 160/70, heart rate of 68 and regular. Respiratory rate 18. O2 saturation 93%. Weight 121 pounds. GENERAL:  She is a pleasant 22-year-old female. Denied pain. She was oriented to person, place and time. Answered questions appropriately. SKIN:  No unusual skin changes. HEENT:  The pupils are equally round and intact. Mucous membranes were dry. NECK:  No JVD. Good carotid pulses. No carotid bruits. No lymphadenopathy or thyromegaly. CARDIOVASCULAR EXAM:  S1 and S2 were normal.  No S3 or S4. Soft systolic blowing type murmur. No diastolic murmur. PMI was normal.  No lift, thrust, or pericardial friction rub. LUNGS:  Clear to auscultation and percussion. ABDOMEN:  Soft and nontender. Good bowel sounds. EXTREMITIES:  Good femoral pulses. Good pedal pulses. No pedal edema. Skin was warm and dry. No calf tenderness. Nail beds pink. Good cap refill. PULSES:  Bilateral symmetrical radial, brachial and carotid pulses. No carotid bruits. Good femoral and pedal pulses. NEUROLOGIC EXAM:  Within normal limits. PSYCHIATRIC EXAM:  Within normal limits. LABORATORY DATA:  Her sodium was 140, potassium 3.9, BUN 17, creatinine 1.01, GFR was 52. Calcium was 10.4. Cholesterol 152 with an HDL of 83, LDL 55, triglycerides 70. ALT was 10, AST was 20. Glucose 105. TSH 0.69. Vitamin D 50.4. White count 6.4, hemoglobin 12.2 with a platelet count of 459,949. Her EKG showed sinus rhythm with a first-degree AV block and a right bundle-branch block, which is unchanged from last year (before that it had been always in atrial fib/flutter).     Echocardiogram done on 06/30/2020, showed an EF of 60%, with severely dilated left atrium and right atrium, moderately dilated right ventricle. She had a normal-appearing and functioning bioprosthetic aortic valve, with a mean gradient of 9 mmHg. She had moderate mitral regurgitation, severe pulmonary hypertension with a PA pressure of 67 mmHg. Chest x-ray was unremarkable. IMPRESSION:  1. History of severe aortic stenosis, with an aortic valve area of 0.6 cm2, with a catheterization on 06/04/2014, that showed 60% RCA, with unremarkable LAD and circumflex, EF of 60%. 2.  Acute renal insufficiency, which resolved, for which she was taken off Indocin for her gout. 3.  Open heart surgery by Dr. Luanne Starr on 07/29/2014, with a vein graft to the right coronary artery and a #23 St. Nasir pericardial Trifecta bioprosthetic valve in the aortic position for severe aortic stenosis. 4.  Mild aortic insufficiency. 5.  PA pressure of 67 mmHg, with moderate-to-severe tricuspid regurgitation, consistent with severe pulmonary hypertension. 6.  History of atrial flutter, but the last 2 years she is in sinus rhythm. 7.  On Coumadin for her paroxysmal atrial fib/flutter. 8.  Euthyroid, on treatment. 9.  Hypertension, very labile. 10.  Hyperlipidemia, well controlled. 11.  Chronic renal insufficiency, doing well at this time with a GFR of 52. 12.  COPD. 13.  Dizziness, consistent with vertigo. PLAN:  1. Start Antivert 12.5 mg t.i.d. with her decreasing now to b.i.d. in 1 to 2 weeks as a trial.  2.  See in 1 year for an echocardiogram, with an appointment 1 week later. DISCUSSION:  Mrs. Duane Contreras overall is doing excellent. She has had no new symptoms to indicate that she needs any testing. Her echocardiogram still looks good and her aortic valve is working well. I will plan on seeing her in a year. Her biggest complaint is that of dizziness. I did take the liberty of giving her Antivert 12.5 mg to take 2 or 3 times a day on a regular basis to see if this helps. I made no other changes.   I will see her in 1 year in followup. Thank you very much for allowing me the privilege of seeing Mrs. Kae Amador. If you have any questions on my thoughts, please do not hesitate to contact me.     Sincerely,        Milady Jacobs    D: 07/16/2020 9:08:07     T: 07/16/2020 12:18:52     [id number]  Job#: [id number]   Doc#: [id number]

## 2020-08-18 NOTE — TELEPHONE ENCOUNTER
Patient family member (male) lvm stating patient is having some swelling in her legs    Attempted to reach patient back at the number given on  [phone]    No one answered unable to lvm

## 2020-08-28 NOTE — TELEPHONE ENCOUNTER
Patient is having leg swelling    She completed a phone insurance physical and they called here to inform provider in [place]      This patient see's Dr. Radha Darnell    She would like to go over leg swelling, memory loss and medications    Please schedule patient

## 2020-09-02 NOTE — PROGRESS NOTES
2020     Kevan Reyna (:  1/10/1931) is a 80 y.o. female, here for evaluation of the following medical concerns:    HPI    80year old female  Here today with c/o falls with dizziness, \"feels wonderful\" seems I am a different person. Having visual hallucinations: Red flowers on the trees just when it is raining, and \"little bugs are in home\" not in there all time. On her body brushing them off her skin. In last 2-3 weeks. Daughter Fadi Lands here today. Hx fall with skin tear 2 inches on left elbow. Adaptic with guaze applied slight erythema at wound site to left elbow skin tear/abrasion, Tdap up to date 2018. Complex cardiac history with hx severe aortic stenosis, with 2014 with bioprosthetic aortic valve replacement. On coumadin. 2014, that showed 60% RCA with sequential lesions, unremarkable LAD and circumflex, severe aortic stenosis, with a mean gradient of 100 and an aortic valve area of 0.4 cm2. She had an EF of 60%. atrial fib flutter and has been on Coumadin. Lives with Cash Adames good friend. Pt here with her daughter. Review of Systems   Constitutional: Negative for activity change, chills and fever. HENT: Negative for congestion and sore throat. Eyes: Negative. Wears glasses   Respiratory: Negative for cough and shortness of breath. Cardiovascular: Negative for chest pain and leg swelling. Gastrointestinal: Negative for abdominal distention. Endocrine: Negative for cold intolerance and heat intolerance. Genitourinary: Negative for difficulty urinating. Musculoskeletal: Negative for arthralgias and myalgias. Skin: Negative for rash. Neurological: Negative for dizziness and headaches. Psychiatric/Behavioral: Negative for agitation. The patient is nervous/anxious (very talkative with conversation). Prior to Visit Medications    Medication Sig Taking?  Authorizing Provider   meclizine (ANTIVERT) 12.5 MG tablet Take 1 tablet by mouth 3 times daily as needed for Dizziness Yes Historical Provider, MD   cephALEXin (KEFLEX) 500 MG capsule Take 1 capsule by mouth 3 times daily for 5 days For UTI and left elbow wound Yes ERICA Carr CNP   levothyroxine (SYNTHROID) 75 MCG tablet TAKE 1 TABLET BY MOUTH DAILY Yes Carlos Corey MD   magnesium oxide (MAG-OX) 400 MG tablet Take 1 tablet by mouth daily Yes Garfield Rodriguez MD   warfarin (COUMADIN) 5 MG tablet TAKE 1 TABLET BY MOUTH EVERY DAY on Tuesdays and Fridays only - - take FOUR mg all other days or AS DIRECTED Yes Garfield Rodriguez MD   warfarin (COUMADIN) 2 MG tablet Take 2 tablets on Sundays, Mondays, Wednesdays, Thursdays, and Saturdays (total of 4 mg) and 1 of the 5 mg tablets on Tuesdays and Fridays or as directed. Managed by Johnny Early Yes Garfield Rodriguez MD   losartan (COZAAR) 50 MG tablet Take ONE-HALF tablet by mouth 2 (TWO) times daily Yes Garfield Rodriguez MD   metoprolol tartrate (LOPRESSOR) 25 MG tablet TAKE 1 TABLET BY MOUTH TWICE DAILY Yes Garfield Rodriguez MD   dilTIAZem (CARTIA XT) 120 MG extended release capsule TAKE 1 CAPSULE BY MOUTH EVERY DAY Yes Garfield Rodriguez MD   allopurinol (ZYLOPRIM) 100 MG tablet TAKE 1 TABLET BY MOUTH TWICE DAILY Yes Carlos Corey MD   sodium chloride (OCEAN, BABY AYR) 0.65 % nasal spray 1 spray by Nasal route as needed for Congestion Yes Historical Provider, MD   clobetasol (TEMOVATE) 0.05 % ointment Apply fingertip amount to perineal/vaginal opening twice a week for lichen sclerosis Yes ERICA Carr CNP   atorvastatin (LIPITOR) 10 MG tablet TAKE 1 TABLET BY MOUTH NIGHTLY Yes Garfield Rodriguez MD   ARTIFICIAL TEAR SOLUTION OP Apply 2 drops to eye 2 times daily as needed (dry eyes)  Yes Kimi Torre   aspirin 81 MG EC tablet Take 81 mg by mouth daily.  Yes Historical Provider, MD   docusate sodium (COLACE) 100 MG capsule Take 100-200 mg by mouth daily as needed for Constipation  Yes Historical Provider, MD   acetaminophen (TYLENOL) 500 MG tablet Take 500-1,000 mg by mouth every 6 hours as needed for Pain  Historical Provider, MD        Social History     Tobacco Use    Smoking status: Former Smoker     Packs/day: 1.00     Years: 35.00     Pack years: 35.00     Types: Cigarettes     Last attempt to quit: 1982     Years since quittin.0    Smokeless tobacco: Never Used   Substance Use Topics    Alcohol use: No     Alcohol/week: 0.0 standard drinks        Vitals:    20 1234   BP: 120/70   Site: Left Upper Arm   Position: Sitting   Cuff Size: Medium Adult   Pulse: 61   Temp: 97.8 °F (36.6 °C)   TempSrc: Infrared   SpO2: 93%   Weight: 141 lb (64 kg)     Estimated body mass index is 25.79 kg/m² as calculated from the following:    Height as of 20: 5' 2\" (1.575 m). Weight as of this encounter: 141 lb (64 kg). Physical Exam  Vitals signs and nursing note reviewed. Constitutional:       General: She is not in acute distress. Appearance: Normal appearance. She is well-developed. HENT:      Head: Normocephalic and atraumatic. Right Ear: Tympanic membrane and external ear normal.      Left Ear: Tympanic membrane and external ear normal.      Nose: No congestion. Mouth/Throat:      Mouth: Mucous membranes are moist.   Eyes:      General: No scleral icterus. Extraocular Movements: Extraocular movements intact. Pupils: Pupils are equal, round, and reactive to light. Comments: No nystagmus, Right lower visual field deficit on exam.    Neck:      Musculoskeletal: Normal range of motion and neck supple. Vascular: No carotid bruit (neg juan). Cardiovascular:      Rate and Rhythm: Normal rate and regular rhythm. Heart sounds: Normal heart sounds. No murmur. Pulmonary:      Effort: Pulmonary effort is normal. No respiratory distress. Breath sounds: Normal breath sounds. No wheezing. Abdominal:      Palpations: Abdomen is soft. There is no mass.

## 2020-09-15 NOTE — PROGRESS NOTES
Fingerstick INR drawn per clinic protocol. Patient states no visible blood in urine and no black tarry stool. Denies any missed doses of warfarin. All other medications reviewed for accuracy. Pat had an appointment with Desmond Gaxiola CNP on 9-2-2020 and was evaluated for a fall, possibly due to dizziness, and visual hallucinations. She will have an MRI of the brain tomorrow here at the hospital. Also, she was given Cephalexin 500 mg TID x 5 days for UTI and left elbow wound after having fallen. No change in other maintenance medications or in diet.  Given her therapeutic INR today, we will continue current warfarin regimen and recheck INR in 5 weeks. Patient acknowledges working in consult agreement with pharmacist as referred by his/her physician.       CLINICAL PHARMACY CONSULT: MED RECONCILIATION/REVIEW ADDENDUM     For Pharmacy Admin Tracking Only     PHSO: No  Total # of Interventions Recommended: 0     - Maintenance Safety Lab Monitoring #: 1     Total Interventions Accepted: 0  Time Spent (min): 30     Guadalupe Blake, CruzD

## 2020-10-20 NOTE — PROGRESS NOTES
Fingerstick INR drawn per clinic protocol. Patient states no visible blood in urine and no black tarry stool. Denies any missed doses of warfarin. All other medications reviewed for accuracy. As discussed previously, Pat had an appointment with Aston Curran CNP on 9-2-2020 and was evaluated for a fall, possibly due to dizziness, and visual hallucinations. Kaz Hare explains that she still sees \"little red bugs\" or \"red flowers in the trees\" off and on, and then \"they just go away\" after a while. She had an MRI of the brain and says that Rafi Ellis wanted her to see a neurologist, but she is refusing at this time. She doesn't want anyone \"cutting on her head\" and says she's \"too old for that\". Kaz Hare also says she stopped taking her Aspirin every day because she had a friend tell her that aspirin \"almost killed his son\". I have explained the importance of aspirin in reducing the risk of cardiovascular complications. She says she will \"probably\" start taking it every day again.  No change in other maintenance medications or in diet. Given her therapeutic INR today, we will continue current warfarin regimen and recheck INR in 5 weeks. Patient acknowledges working in consult agreement with pharmacist as referred by his/her physician.       CLINICAL PHARMACY CONSULT: MED RECONCILIATION/REVIEW ADDENDUM     For Pharmacy Admin Tracking Only     PHSO: No  Total # of Interventions Recommended: 0     - Maintenance Safety Lab Monitoring #: 1     Total Interventions Accepted: 0  Time Spent (min): 30     Sammy Almaraz PharmD

## 2020-11-13 NOTE — TELEPHONE ENCOUNTER
Patient is down to her last Allopurinol - patient uses [facility] Maintenance   Topic Date Due    Annual Wellness Visit (AWV)  05/29/2019    Shingles Vaccine (2 of 2) 10/20/2020    Lipid screen  06/30/2021    TSH testing  06/30/2021    Potassium monitoring  06/30/2021    Creatinine monitoring  06/30/2021    DTaP/Tdap/Td vaccine (2 - Td) 09/05/2028    Flu vaccine  Completed    Pneumococcal 65+ years Vaccine  Completed    Hepatitis A vaccine  Aged Out    Hepatitis B vaccine  Aged Out    Hib vaccine  Aged Out    Meningococcal (ACWY) vaccine  Aged Out             (applicable per patient's age: Cancer Screenings, Depression Screening, Fall Risk Screening, Immunizations)    LDL Cholesterol (mg/dL)   Date Value   06/30/2020 55     AST (U/L)   Date Value   06/30/2020 20     ALT (U/L)   Date Value   06/30/2020 10     BUN (mg/dL)   Date Value   06/30/2020 17      (goal A1C is < 7)   (goal LDL is <100) need 30-50% reduction from baseline     BP Readings from Last 3 Encounters:   10/20/20 139/65   09/02/20 120/70   07/16/20 (!) 160/70    (goal /80)      All Future Testing planned in [facility]:  Lab Frequency Next Occurrence   TSH with Reflex Once 07/16/2021   CBC Auto Differential Once 07/16/2021   Comprehensive Metabolic Panel Once 01/09/6648   Vitamin D 25 Hydroxy Once 07/16/2021   Lipid Panel Once 07/16/2021   Magnesium Once 07/16/2021   EKG 12 Lead Once 07/16/2021   XR CHEST STANDARD (2 VW) Once 07/16/2021   ECHO Complete 2D W Doppler W Color Once 07/16/2021   POCT INR         Next Visit Date:  Future Appointments   Date Time Provider Lily Marinelli   11/24/2020  7:30 AM JOSE G MEDICATION [facility] MED [facility] Jose G   7/6/2021  9:30 AM [facility] ECHO ROOM [facility] JOSE G MW ECHO Arvella Pali   [facility]15[facility]2021  [facility]            Patient Active Problem List:     Hypothyroidism     Migraine with aura     Agoraphobia     Rosacea     Essential hypertension, benign     Senile

## 2020-11-24 NOTE — TELEPHONE ENCOUNTER
Pt called c/o dizziness again   Informed to follow up with DR Hayley Molina   States they want to do ct scan and I   dont want it\"  Pt is requesting antivert which helped before   Talked with Dr Tahir Dalton ok to give

## 2020-11-28 NOTE — ED PROVIDER NOTES
[facility]  eMERGENCY dEPARTMENT eNCOUnter          [facility]       Chief Complaint   Patient presents with    Fatigue     fell the smorning. Has Fatigue and diarhhea. Has not been eating well. Daughter states last time she acted this way she had a UTI    Fall       Nurses Notes reviewed and I agree except as noted in the HPI. HISTORY OF PRESENT ILLNESS    Christie De La Torre is a 80 y.o. female who presents to the emergency room via private vehicle with daughter, who reports patient did have a fall this morning and has had multiple falls of late, has been increasingly tired had noted some diarrhea, decreased diarrhea, daughter saying that patient said similar symptom in the past when she has a urinary tract infection. Patient denies any pain otherwise related retraction states she is having some back pain indicating her thoracolumbar region. PCP: Mauro Castellanos    REVIEW OF SYSTEMS     Review of Systems   Constitutional: Positive for appetite change and fatigue. Gastrointestinal: Positive for diarrhea. Musculoskeletal: Positive for back pain. All other systems reviewed and are negative. PAST MEDICAL HISTORY    has a past medical history of Acute renal failure (ARF) ([address] Anemia, Aortic stenosis, severe, Arthritis, H/O aortic valve replacement, Hx of CABG, Hypertension, Hypothyroidism, Nosebleed, and Osteoporosis. SURGICAL HISTORY      has a past surgical history that includes partial hysterectomy (cervix not removed); Carpal tunnel release; Cardiac catheterization (Left, 06/04/14); Cardiac catheterization (Left, 06/04/14); Appendectomy; Aortic valve replacement (7/29/14); and Hysterectomy.     CURRENT MEDICATIONS       Discharge Medication List as of 11/27/2020  7:47 PM      CONTINUE these medications which have NOT CHANGED    Details   meclizine (ANTIVERT) 12.5 MG tablet 90Take 1 tablet by mouth 3 times daily as needed for Dizziness, Disp-90 tablet,R-1Normal allopurinol (ZYLOPRIM) 100 MG tablet TAKE 1 TABLET BY MOUTH TWICE DAILY, Disp-60 tablet,R-2We are requesting this refill to have on file for next month s order. Normal      atorvastatin (LIPITOR) 10 MG tablet Take 1 tablet by mouth nightly, Disp-90 tablet,R-3We are requesting this refill to have on file for next month s order. Normal      levothyroxine (SYNTHROID) 75 MCG tablet TAKE 1 TABLET BY MOUTH DAILY, Disp-90 tablet,R-1We are requesting this refill to have on file for next month s order. Normal      magnesium oxide (MAG-OX) 400 MG tablet Take 1 tablet by mouth daily, Disp-90 tablet,R-3Normal      !! warfarin (COUMADIN) 5 MG tablet TAKE 1 TABLET BY MOUTH EVERY DAY on Tuesdays and Fridays only - - take FOUR mg all other days or AS DIRECTED, Disp-30 tablet,R-11Normal      !! warfarin (COUMADIN) 2 MG tablet Take 2 tablets on Sundays, Mondays, Wednesdays, Thursdays, and Saturdays (total of 4 mg) and 1 of the 5 mg tablets on Tuesdays and Fridays or as directed. Managed by [facility], Disp-180 tablet,R-3Normal      losartan (COZAAR) 50 MG tablet Take ONE-HALF tablet by mouth 2 (TWO) times daily, Disp-180 tablet,R-3Normal      metoprolol tartrate (LOPRESSOR) 25 MG tablet TAKE 1 TABLET BY MOUTH TWICE DAILY, Disp-180 tablet,R-3We are requesting this refill to have on file for next month s order. Normal      dilTIAZem (CARTIA XT) 120 MG extended release capsule TAKE 1 CAPSULE BY MOUTH EVERY DAY, Disp-90 capsule, R-3Normal      acetaminophen (TYLENOL) 500 MG tablet Take 500-1,000 mg by mouth every 6 hours as needed for PainHistorical Med      aspirin 81 MG EC tablet Take 81 mg by mouth daily.       sodium chloride (OCEAN, BABY AYR) 0.65 % nasal spray 1 spray by Nasal route as needed for CongestionHistorical Med      clobetasol (TEMOVATE) 0.05 % ointment Apply fingertip amount to perineal/vaginal opening twice a week for lichen sclerosis, ENST-83 g, R-1, Normal      ARTIFICIAL TEAR SOLUTION OP Apply 2 drops to eye 2 times daily as needed (dry eyes) Historical Med      docusate sodium (COLACE) 100 MG capsule Take 100-200 mg by mouth daily as needed for Constipation Historical Med       !! - Potential duplicate medications found. Please discuss with provider. ALLERGIES     is allergic to codeine. FAMILY HISTORY     She indicated that her mother is . She indicated that her father is . She indicated that her sister is . She indicated that both of her brothers are alive. She indicated that her maternal aunt is alive. family history includes Heart Disease in her brother. SOCIAL HISTORY      reports that she quit smoking about 38 years ago. Her smoking use included cigarettes. She has a 35.00 pack-year smoking history. She has never used smokeless tobacco. She reports that she does not drink alcohol or use drugs. PHYSICAL EXAM     INITIAL VITALS:  weight is 140 lb (63.5 kg). Her temporal temperature is 97 °F (36.1 °C). Her blood pressure is 118/49 (abnormal) and her pulse is 64. Her respiration is 16 and oxygen saturation is 92%. Physical Exam   Constitutional: Patient is awake and alert. Patient appears well-developed and well-nourished. Patient is active and cooperative. HENT:   Head: Normocephalic and atraumatic. Head is without contusion. Right Ear: Hearing and external ear normal. No drainage. Left Ear: Hearing and external ear normal. No drainage. Nose: Nose normal. No nasal deformity. No epistaxis. Mouth/Throat: Mucous membranes are slight dry. Eyes: EOMI. Conjunctivae, sclera, and lids are normal. Right eye exhibits no discharge. Left eye exhibits no discharge. Neck: Full passive range of motion without pain and phonation normal.   Cardiovascular:  Normal rate, regular rhythm and intact distal pulses. No gross lower extremity edema. Pulses: Right radial pulse  2+   Pulmonary/Chest: Effort normal. No tachypnea and no bradypnea.  No wheezes, rhonchi, or rales. Abdominal: Soft. Patient without distension or tenderness. There is no CVA tenderness. Musculoskeletal:   Focused examination patient's back shows no overlying integument aberration, there is no vertebral point step-off or point tenderness, mild paraspinal tenderness in the thoracolumbar and upper lumbar regions. Except as otherwise noted, negative acute trauma or deformity,  apparent full range of motion and normal strength all extremities appropriate to age. Neurological: Patient is alert and awake. patient displays no tremor. Patient displays no seizure activity. Skin: Skin is warm and dry. Patient is not diaphoretic. Psychiatric: Patient has a normal mood and overall pleasant affect. Patient speech is normal and behavior is normal.    DIFFERENTIAL DIAGNOSIS:   UTI, dysrhythmia NOS, dehydration, DAYDAY, electrolyte abnormality,    DIAGNOSTIC RESULTS     EKG: All EKG's are interpreted by the Emergency Department Physician who either signs or Co-signs this chart in the absence of a cardiologist.  EKG    The patient had an EKG which is interpreted by me in the absence of a Cardiologist.   [] Without comparison to previous.    [x] With comparison to a previous EKG Dated  6/30/2020    EKG @ 1808 hrs -sinus bradycardia with first-degree AV block rate 59, ,  , noted RBBB, as compared to prior EKG no changes morphology      RADIOLOGY: non-plain film images(s) such as CT, Ultrasound and MRI are read by the radiologist.  No orders to display       LABS:   Labs Reviewed   CBC WITH AUTO DIFFERENTIAL - Abnormal; Notable for the following components:       Result Value    RDW 17.2 (*)     Platelets 836 (*)     Monocytes 9 (*)     Absolute Lymph # 0.80 (*)     All other components within normal limits   COMPREHENSIVE METABOLIC PANEL W/ REFLEX TO MG FOR LOW K - Abnormal; Notable for the following components:    Glucose 223 (*)     BUN 29 (*)     CREATININE 2.09 (*)     Sodium 131 (*) Potassium 3.4 (*)     Chloride 93 (*)     AST 51 (*)     GFR Non- 22 (*)     GFR  27 (*)     All other components within normal limits   URINALYSIS - Abnormal; Notable for the following components:    Turbidity UA CLOUDY (*)     Bilirubin Urine 1+ (*)     Ketones, Urine TRACE (*)     Urine Hgb TRACE (*)     Protein, UA 1+ (*)     Urobilinogen, Urine 4 mg/dL (*)     Leukocyte Esterase, Urine 3+ (*)     All other components within normal limits   PROTIME-INR - Abnormal; Notable for the following components:    Protime 19.8 (*)     All other components within normal limits   MICROSCOPIC URINALYSIS - Abnormal; Notable for the following components:    Bacteria, UA 1+ (*)     Mucus, UA 1+ (*)     All other components within normal limits   CULTURE, URINE   TROPONIN   MAGNESIUM       EMERGENCY DEPARTMENT COURSE:   Vitals:    Vitals:    11/27/20 1757 11/27/20 1815   BP: (!) 118/49    Pulse: 64    Resp: 16    Temp:  97 °F (36.1 °C)   TempSrc:  Temporal   SpO2: 92%    Weight: 140 lb (63.5 kg)      Patient history and physical exam taken at bedside with daughter present, discussed patient symptoms and exam findings, discussed initial work-up to include blood and urine studies, IV access. Patient sitting in bed semi-Fowlers with daughter at bedside.     Initial lab work-up reviewed, noting elevated serum creatinine over baseline, normal white blood cell count and differential, normal troponin, INR subtherapeutic at 1.8    IV fluid bolus ordered    Discussed with patient and patient's daughter initial lab findings, discussed giving IV fluid bolus, would still like to get urine sample, acknowledged    Urinalysis reviewed    Discussed my concern for abnormalities of found in urine, confirmed allergies, will give dose ceftriaxone the emergency room, we did discuss possible admission versus discharge with close outpatient follow-up, with both patient and patient's daughter wanting to go home, I did review prior cultures noting E faecalis sensitive to nitrofurantoin, will discharge prescription same, outpatient labs, outpatient follow-up, staying well hydrated, return to ER if symptoms change worsen or other concerns. Realized at time of dictation that I had not prescribed patient's medication for UTI or printed outpatient lab slip, as we discussed nitrofurantoin will send prescription directly to pharmacy, outpatient lab order printed, will have patient contacted in morning by nursing supervisor regarding prescription sent to pharmacy and outpatient lab order    FINAL IMPRESSION      1. Acute cystitis with hematuria    2. DAYDAY (acute kidney injury) ([facility] [address]    3. Multiple falls          DISPOSITION/PLAN   D/c    PATIENT REFERRED TO:  Jacqueline Quinn MD  [address] [place] [id number]  [phone]    Call       [facility] ED  [address] [place] [id number] [phone]    As needed, If symptoms worsen      DISCHARGE MEDICATIONS:  Discharge Medication List as of 11/27/2020  7:47 PM              Summation      Patient Course:  D/c    ED Medications administered this visit:    Medications   0.9 % sodium chloride bolus (0 mLs Intravenous Stopped 11/27/20 2008)   cefTRIAXone (ROCEPHIN) 1 g in sterile water 10 mL IV syringe (0 g Intravenous Stopped 11/27/20 2020)       New Prescriptions from this visit:    Discharge Medication List as of 11/27/2020  7:47 PM          Follow-up:  Jacqueline Quinn MD  [address] [place] [address]  [phone]    Call       [facility] ED  [address] [place] [id number] [phone]    As needed, If symptoms worsen        Final Impression:   1. Acute cystitis with hematuria    2. DAYDAY (acute kidney injury) ([facility] [address]    3.  Multiple falls               (Please note that portions of this note were completed with a voice recognition program.  Efforts were made to edit the dictations but occasionally words are mis-transcribed.)    MD Nick Brown MD  11/28/20 7699

## 2020-12-02 PROBLEM — B37.7: Status: ACTIVE | Noted: 2020-01-01

## 2020-12-02 PROBLEM — R65.20: Status: ACTIVE | Noted: 2020-01-01

## 2020-12-02 PROBLEM — U07.1 COVID-19 VIRUS DETECTED: Status: ACTIVE | Noted: 2020-01-01

## 2020-12-02 PROBLEM — J96.01: Status: ACTIVE | Noted: 2020-01-01

## 2020-12-02 PROBLEM — J96.01 ACUTE RESPIRATORY FAILURE WITH HYPOXIA (HCC): Status: ACTIVE | Noted: 2020-01-01

## 2020-12-02 NOTE — ED NOTES
Call placed to [facility], they are at max capacity and not accepting any patients at this time. Per Pt daughter they would like to try [place].       Alfreda Sims RN  12/02/20 4808

## 2020-12-02 NOTE — ED NOTES
Advance Care Planning     Advance Care Planning Activator (Inpatient)  Conversation Note      Date of ACP Conversation: 12/2/2020    Conversation Conducted with: Patient with Decision Making Capacity    ACP Activator: Lux Maguire    *When Decision Maker makes decisions on behalf of the incapacitated patient: Decision Maker is asked to consider and make decisions based on patient values, known preferences, or best interests. Health Care Decision Maker:     Current Designated Health Care Decision Maker:   Primary Decision Maker: Sri Greer - Child - [phone]  (If there is a [address] named in the [facility] Makers\" box in the ACP activity, but it is not visible above, be sure to open that field and then select the health care decision maker relationship (ie \"primary\") in the blank space to the right of the name.) Validate  this information as still accurate & up-to-date; edit [employer] field as needed.)    Note: Assess and validate information in current ACP documents, as indicated. If no Decision Maker listed above or available through scanned documents, then:    If no Authorized Decision Maker has previously been identified, then patient chooses [address]:  \"Who would you like to name as your primary health care decision-maker? \"               Name: Libby Esparza         Relationship: child          Phone number: [phone]  Yariel Garcia this person be reached easily? \" Yes  \"Who would you like to name as your back-up decision maker? \"   Name: Da Freeman        Relationship: child          Phone number: [phone]  Yariel Garcia this person be reached easily? \" Yes    Note: If the relationship of these Decision-Makers to the patient does NOT follow your state's Next of Kin hierarchy, recommend that patient complete ACP document that meets state-specific requirements to allow them to act on the patient's behalf when appropriate.     Care Preferences    Ventilation: \"If you were in your present state of health and suddenly became very ill and were unable to breathe on your own, what would your preference be about the use of a ventilator (breathing machine) if it were available to you? \"      Would the patient desire the use of ventilator (breathing machine)?: no    \"If your health worsens and it becomes clear that your chance of recovery is unlikely, what would your preference be about the use of a ventilator (breathing machine) if it were available to you? \"     Would the patient desire the use of ventilator (breathing machine)?: No      Resuscitation  \"CPR works best to restart the heart when there is a sudden event, like a heart attack, in someone who is otherwise healthy. Unfortunately, CPR does not typically restart the heart for people who have serious health conditions or who are very sick. \"    \"In the event your heart stopped as a result of an underlying serious health condition, would you want attempts to be made to restart your heart (answer \"yes\" for attempt to resuscitate) or would you prefer a natural death (answer \"no\" for do not attempt to resuscitate)? \" yes      NOTE: If the patient has a valid advance directive AND now provides care preference(s) that are inconsistent with that prior directive, advise the patient to consider either: creating a new advance directive that complies with state-specific requirements; or, if that is not possible, orally revoking that prior directive in accordance with state-specific requirements, which must be documented in the EHR. [x] Yes   [] No   Educated Patient / Petra Proud regarding differences between Advance Directives and portable DNR orders.     Length of ACP Conversation in minutes:      Conversation Outcomes:  [x] ACP discussion completed  [x] Existing advance directive reviewed with patient; no changes to patient's previously recorded wishes  [] New Advance Directive completed  [] Portable Do Not Rescitate prepared for Provider review and signature  [] POLST/POST/MOLST/MOST prepared for Provider review and signature      Follow-up plan:    [] Schedule follow-up conversation to continue planning  [] Referred individual to Provider for additional questions/concerns   [] Advised patient/agent/surrogate to review completed ACP document and update if needed with changes in condition, patient preferences or care setting    [] This note routed to one or more involved healthcare providers          Matthew Grande RN  12/02/20 1926

## 2020-12-02 NOTE — ED NOTES
Informed Dr. Sheree Mehta of SPO2 dropping to 80%. Virgen ENCARNACION called RT. RT in room now.       Lux Maguire RN  12/02/20 7564

## 2020-12-02 NOTE — PATIENT INSTRUCTIONS
Survey: You may be receiving a survey from [employer] regarding your visit today. You may get this in the mail, through your MyChart or in your email. Please complete the survey to enable us to provide the highest quality of care to you and your family. Please also, mention our names. If you cannot score us as very good (5 Stars) on any question, please feel free to call the office to discuss how we could have made your experience exceptional.      Thank You!         MD Carmen Alicea LPN

## 2020-12-02 NOTE — ED PROVIDER NOTES
[facility]  eMERGENCY dEPARTMENT eNCOUnter          [facility]       Chief Complaint   Patient presents with    Fatigue     Pt sent over from Dr. Prema Mendoza office for increased weakness and not wanting to eat per pt daughter       Nurses Notes reviewed and I agree except as noted in the HPI. HISTORY OF PRESENT ILLNESS    Peter Erazo is a 80 y.o. female who presents emergency room via private vehicle, patient had been at PCP office for ER follow-up from 11/27, PCP noting that patient normally is very active and now appears very weak with onset of symptoms approximate 11/25 (day befor Thanksgiving holiday given as onset by family present), patient stating she feels a little short of breath though noting normal pulse oximetry in office. Patient has been compliant with her Macrobid as she is being treated for presumptive UTI pending urine cultures, which returned to normal.    Upon arrival in ER, patient states \"I feel like hell\", does now she is a little short of breath, continues to have a little bit of diarrhea, denies fever chills denies nausea vomiting denies dysuria or hematuria, denies loss of smell but does indicate some loss of taste. Per daughter present, patient had significant decrease in appetite, only having a small piece of toast and banana a day prior. PCP: Prema Mendoza  Cards: Mireya    REVIEW OF SYSTEMS     Review of Systems   Constitutional: Positive for appetite change. Negative for chills and fever. Respiratory: Positive for shortness of breath. Cardiovascular: Negative for chest pain, palpitations and leg swelling. Gastrointestinal: Positive for diarrhea. Negative for abdominal pain, nausea and vomiting. Genitourinary: Negative for dysuria, frequency and hematuria. Neurological: Positive for weakness. All other systems reviewed and are negative.          PAST MEDICAL HISTORY    has a past medical history of Acute renal failure (ARF) ([facility] [address] Anemia, Aortic stenosis, severe, Arthritis, H/O aortic valve replacement, Hx of CABG, Hypertension, Hypothyroidism, Nosebleed, and Osteoporosis. SURGICAL HISTORY      has a past surgical history that includes partial hysterectomy (cervix not removed); Carpal tunnel release; Cardiac catheterization (Left, 06/04/14); Cardiac catheterization (Left, 06/04/14); Appendectomy; Aortic valve replacement (7/29/14); and Hysterectomy. CURRENT MEDICATIONS       Previous Medications    ACETAMINOPHEN (TYLENOL) 500 MG TABLET    Take 500-1,000 mg by mouth every 6 hours as needed for Pain    ALLOPURINOL (ZYLOPRIM) 100 MG TABLET    TAKE 1 TABLET BY MOUTH TWICE DAILY    ARTIFICIAL TEAR SOLUTION OP    Apply 2 drops to eye 2 times daily as needed (dry eyes)     ASPIRIN 81 MG EC TABLET    Take 81 mg by mouth daily.     ATORVASTATIN (LIPITOR) 10 MG TABLET    Take 1 tablet by mouth nightly    CLOBETASOL (TEMOVATE) 0.05 % OINTMENT    Apply fingertip amount to perineal/vaginal opening twice a week for lichen sclerosis    DILTIAZEM (CARTIA XT) 120 MG EXTENDED RELEASE CAPSULE    TAKE 1 CAPSULE BY MOUTH EVERY DAY    DOCUSATE SODIUM (COLACE) 100 MG CAPSULE    Take 100-200 mg by mouth daily as needed for Constipation     LEVOTHYROXINE (SYNTHROID) 75 MCG TABLET    TAKE 1 TABLET BY MOUTH DAILY    LOSARTAN (COZAAR) 50 MG TABLET    Take ONE-HALF tablet by mouth 2 (TWO) times daily    MAGNESIUM OXIDE (MAG-OX) 400 MG TABLET    Take 1 tablet by mouth daily    MECLIZINE (ANTIVERT) 12.5 MG TABLET    90Take 1 tablet by mouth 3 times daily as needed for Dizziness    METOPROLOL TARTRATE (LOPRESSOR) 25 MG TABLET    TAKE 1 TABLET BY MOUTH TWICE DAILY    NITROFURANTOIN, MACROCRYSTAL-MONOHYDRATE, (MACROBID) 100 MG CAPSULE    Take 1 capsule by mouth 2 times daily for 10 days    SODIUM CHLORIDE (OCEAN, BABY AYR) 0.65 % NASAL SPRAY    1 spray by Nasal route as needed for Congestion    WARFARIN (COUMADIN) 2 MG TABLET    Take 2 tablets on Sundays, Mondays, Right radial pulse  2+   Pulmonary/Chest: Effort normal. No tachypnea and no bradypnea. No wheezes, rhonchi, or rales. Abdominal: Soft. Patient without distension or tenderness  Musculoskeletal:   Negative acute trauma or deformity,  apparent full range of motion and normal strength all extremities appropriate to age. Neurological: Patient is alert and oriented to person, place, and time. patient displays no tremor. Patient displays no seizure activity. .   Skin: Skin is warm and dry, noted slight coolness of fingertips. Patient is not diaphoretic. Psychiatric: Patient has a normal mood and affect. Patient speech is normal and behavior is normal. Cognition and memory are normal.    DIFFERENTIAL DIAGNOSIS:   CVA/TIA, ICH, pneumonia bronchitis URI, viral illness NOS, UTI, decompensation, DAYDAY, electrolyte abnormality    DIAGNOSTIC RESULTS     EKG: All EKG's are interpreted by the Emergency Department Physician who either signs or Co-signs this chart in the absence of a cardiologist.  EKG    The patient had an EKG which is interpreted by me in the absence of a Cardiologist.   [] Without comparison to previous. [x] With comparison to a previous EKG Dated 6/30/2020    EKG @ 0858 hrs -sinus rhythm with first-degree AV block rate 67   QTC of 519, normal axis, noted RBBB, prolonged QTC, as compared to prior EKG noted increased QTC 34 ms otherwise no changes morphology      RADIOLOGY: non-plain film images(s) such as CT, Ultrasound and MRI are read by the radiologist.  CT Head WO Contrast   Final Result      1. Senescent changes in the brain. 2. No intracranial hemorrhage, mass, or other acute findings that would account    for weakness.          XR CHEST PORTABLE    (Results Pending)       LABS:   Labs Reviewed   CBC WITH AUTO DIFFERENTIAL - Abnormal; Notable for the following components:       Result Value    RDW 17.1 (*)     Seg Neutrophils 80 (*)     Lymphocytes 10 (*)     Monocytes 10 (*) Absolute Lymph # 0.60 (*)     All other components within normal limits   PROTIME-INR - Abnormal; Notable for the following components:    Protime 37.9 (*)     All other components within normal limits   COMPREHENSIVE METABOLIC PANEL W/ REFLEX TO MG FOR LOW K - Abnormal; Notable for the following components:    Glucose 126 (*)     BUN 30 (*)     CREATININE 1.22 (*)     CO2 17 (*)     Anion Gap 18 (*)     AST 41 (*)     Alb 3.4 (*)     GFR Non- 42 (*)     GFR  50 (*)     All other components within normal limits   COVID-19 - Abnormal; Notable for the following components:    SARS-CoV-2, Rapid DETECTED (*)     All other components within normal limits   TROPONIN   TSH WITH REFLEX   URINALYSIS       EMERGENCY DEPARTMENT COURSE:   Vitals:    Vitals:    12/02/20 0946 12/02/20 1001 12/02/20 1016 12/02/20 1032   BP: (!) 125/48 (!) 121/59 (!) 135/55 (!) 135/48   Pulse: 62 66 67 68   Resp: 19 16 17 14   Temp:       TempSrc:       SpO2: 93% 92% 91% 92%   Weight:       Height:           Patient history and physical exam taken at bedside, discussed patient symptoms and exam findings, initial plan work-up will include CT head noncontrast, EKG, blood and urine studies, IV access. Palpation patient on cardiac monitor, patient with good blood pressure, pulse, however difficulty getting patient's SPO2 adequate, with readjustment showing good waveform however SPO2 remains in the 60s. Will place patient on O2 via nasal cannula and reassess. EMR reviewed, including ED note 11/27/2020, patient was treated for presumptive acute cystitis with hematuria, DAYDAY, multiple falls, patient patient's daughter who wanted to go home and was discharged on [facility] with outpatient labs and follow-up. Urine culture drawn at that time showed no growth.     With O2 via n/c at 3 LPM, pulse ox improved to 93%    EKG as above    Initial labs reviewed, noting WBC 5.5 percent PMNs no reported bands, normal troponin, serum creatinine 1.22 improved over prior days ago 2.09, electrolytes normal, note CO2 of 17 suggesting dehydration, normal HFP. Normal saline 500 cc bolus IV x1 ordered. COVID-19 test POSITIVE    Patient and patient's daughter updated on current work-up, discussed rapid Covid test being positive, some concerns for symptoms dehydration patient generalized weakness, I do note patient on 4 L O2 will range from 87 to 91%, discussed my overall feel the patient may be better being admitted to be a continuous O2 with some physical therapy, after further discussion will contact [facility] in [address] for possible admission    Advised at [facility] [place] [facility] is accepting patients at this time, after discussion with patient's daughter will try [facility]    Case was discussed with [facility] transfer center, regarding patient's presentation current work-up, will await callback for possible bed assignment    Advised that [facility] is not accepting patients at this time    Discussed with patient's daughter, again there was some discussion with her patient to be admitted I do have concerns with patient's significant weakness as well as oxygen demands at the upper limit of what her home concentrator can perform, will try [id number] [place]    Case was discussed with Dr. Bety Xie to [facility], hospitalist, regarding patient presentation current work-up, accepted for transfer    Was noted the patient's pulse ox appear to be dropping, however this is related to patient pulling off her nasal cannula she does not like it, this was discussed with patient the importance of it, there was option of switching to a Ventimask though patient seems to be tolerating nasal cannula at this time.     Chest x-ray reviewed    Continue to monitor patient in the emergency room, at times patient would pull off her shift her nasal cannula otherwise maintained stable, did order patient tray of food as she had not eaten, will continue to monitor while waiting transport        FINAL IMPRESSION      1. Lab test positive for detection of COVID-19 virus    2. General weakness    3. Symptoms of dehydration          DISPOSITION/PLAN   trn    PATIENT REFERRED TO:  No follow-up provider specified. DISCHARGE MEDICATIONS:  New Prescriptions    No medications on file           Summation      Patient Course:  trn    ED Medications administered this visit:    Medications   0.9 % sodium chloride bolus (0 mLs Intravenous Stopped 12/2/20 1116)       New Prescriptions from this visit:    New Prescriptions    No medications on file       Follow-up:  No follow-up provider specified. Final Impression:   1. Lab test positive for detection of COVID-19 virus    2. General weakness    3.  Symptoms of dehydration               (Please note that portions of this note were completed with a voice recognition program.  Efforts were made to edit the dictations but occasionally words are mis-transcribed.)    MD Katty Daley MD  12/02/20 9779

## 2020-12-02 NOTE — ED NOTES
Report given to Corinthia Snellen and Eusebia Jordan with [facility].       Cristal Sim RN  12/02/20 9104

## 2020-12-02 NOTE — PROGRESS NOTES
HPI Notes    Name: Pavithra Soriano  : 1/10/1931        Chief Complaint:     Chief Complaint   Patient presents with   [facility] ED Follow-up     [facility] ED 20,  weakness, fatigue, fall. Treated for cystitis, continues on macrobid    Fatigue       History of Present Illness:     Pavithra Soriano is a 80 y.o.  female who presents with ED Follow-up ([address] [facility] ED 20,  weakness, fatigue, fall. Treated for cystitis, continues on macrobid) and Fatigue      Fatigue   This is a new problem. Episode onset: symptoms started they feel last Wednesday the day before . Prior to this pt was up in house and cleaning and cooking. Pt now can't get up without assistance from her daughter. Pt is SO weak so didn't even feel like getting her labs. The problem occurs constantly. The problem has been unchanged. Associated symptoms include fatigue. Pertinent negatives include no chest pain, congestion, coughing, fever, headaches, nausea, rash, sore throat, vomiting or weakness. Treatments tried: treatment for a UTI but Not getting any better    Urinary Tract Infection    Chronicity: pt has been feeling week since . She fell on  and was taken to [facility] ER. Since pt so weak the daughter felt she had acted like that before when she had a UTI.  pt had cloudy urine with LE so treated with macrobid. The problem has been unchanged (Pt not feeling any better. Still so weak and not eating. Plus now complaining of some SOB.). There has been no fever. Pertinent negatives include no nausea or vomiting. Decreased appetite - pt states she has no appetite and admits taste is less. Pt has no N/V/D. No abdominal pain but nothing taste good. SOB - Pt admits she does feel SOB. No cough or fever. Pt has been feeling ill for 1wk. The SOB is new for pt per daughter who stays with her.    Past Medical History:     Past Medical History:   Diagnosis Date    Acute renal failure (ARF) ([facility] [address]     2014    Anemia     Aortic stenosis, severe     5/2014 echo    Arthritis     right shoulder    H/O aortic valve replacement 7/29/14    [facility] Dr. Ky Dominguez Hx of CABG 7/29/14    Hypertension     Hypothyroidism     Nosebleed     Osteoporosis       Reviewed all health maintenance requirements and ordered appropriate tests  Health Maintenance Due   Topic Date Due    Annual Wellness Visit (AWV)  05/29/2019    Shingles Vaccine (2 of 2) 10/20/2020       Past Surgical History:     Past Surgical History:   Procedure Laterality Date    AORTIC VALVE REPLACEMENT  7/29/14    [place] Dr. Hoang Reno [facility]04/[facility] Ant[facility] 60% disease in the ostium of the right coronary artery followed by a 60% lesion in the midportion. Unremarkable left anterior descending &circumflex. Normal left ventricular function,ejection fraction of 60%. Normal left ventricular function, ejection fraction of 60%. Severe aortic stenosis with a 100 mm gradient across the aortic valve with an aortic valve area of    CARDIAC CATHETERIZATION Left 06/04/14    0.4 cm2. Moderate pulmonary hypertension with a pulmonary artery pressure of 50/30.  CARPAL TUNNEL RELEASE      bilateral     HYSTERECTOMY      PARTIAL HYSTERECTOMY          Medications:       Prior to Admission medications    Medication Sig Start Date End Date Taking?  Authorizing Provider   nitrofurantoin, macrocrystal-monohydrate, (MACROBID) 100 MG capsule Take 1 capsule by mouth 2 times daily for 10 days 11/28/20 12/8/20  Radha Sterling MD   meclizine (ANTIVERT) 12.5 MG tablet 90Take 1 tablet by mouth 3 times daily as needed for Dizziness 11/24/20   Chrissie Pham MD   allopurinol (ZYLOPRIM) 100 MG tablet TAKE 1 TABLET BY MOUTH TWICE DAILY 11/13/20   ERICA Marr CNP   atorvastatin (LIPITOR) 10 MG tablet Take 1 tablet by mouth nightly 9/11/20   Chrissie Pham MD   levothyroxine (SYNTHROID) 75 MCG tablet TAKE 1 TABLET BY MOUTH DAILY 9/1/20   Kathrine Shanks MD   magnesium oxide (MAG-OX) 400 MG tablet Take 1 tablet by mouth daily 7/16/20   Ace Murcia MD   warfarin (COUMADIN) 5 MG tablet TAKE 1 TABLET BY MOUTH EVERY DAY on Tuesdays and Fridays only - - take FOUR mg all other days or AS DIRECTED 7/16/20   Ace Murcia MD   warfarin (COUMADIN) 2 MG tablet Take 2 tablets on Sundays, Mondays, Wednesdays, Thursdays, and Saturdays (total of 4 mg) and 1 of the 5 mg tablets on Tuesdays and Fridays or as directed. Managed by [address] 7/16/20   Ace Murcia MD   losartan (COZAAR) 50 MG tablet Take ONE-HALF tablet by mouth 2 (TWO) times daily 7/16/20   Ace Murcia MD   metoprolol tartrate (LOPRESSOR) 25 MG tablet TAKE 1 TABLET BY MOUTH TWICE DAILY 7/16/20   Ace Murcia MD   dilTIAZem (CARTIA XT) 120 MG extended release capsule TAKE 1 CAPSULE BY MOUTH EVERY DAY 6/12/20   Ace Murcia MD   sodium chloride (OCEAN, BABY AYR) 0.65 % nasal spray 1 spray by Nasal route as needed for Congestion    Historical Provider, MD   clobetasol (TEMOVATE) 0.05 % ointment Apply fingertip amount to perineal/vaginal opening twice a week for lichen sclerosis 37/59/79   ERICA Giraldo - CNP   acetaminophen (TYLENOL) 500 MG tablet Take 500-1,000 mg by mouth every 6 hours as needed for Pain    Historical Provider, MD   ARTIFICIAL TEAR SOLUTION OP Apply 2 drops to eye 2 times daily as needed (dry eyes)  7/18/17   Kimi Torre   aspirin 81 MG EC tablet Take 81 mg by mouth daily. Historical Provider, MD   docusate sodium (COLACE) 100 MG capsule Take 100-200 mg by mouth daily as needed for Constipation     Historical Provider, MD        Allergies:       Codeine    Social History:     Tobacco:    reports that she quit smoking about 38 years ago. Her smoking use included cigarettes. She has a 35.00 pack-year smoking history.  She has never used smokeless tobacco.  Alcohol:      reports no history of alcohol use. Drug Use:  reports no history of drug use. Family History:     Family History   Problem Relation Age of Onset    Heart Disease Brother        Review of Systems:       Review of Systems   Constitutional: Positive for fatigue. Negative for fever. HENT: Negative for congestion and sore throat. Eyes: Negative for discharge and redness. Respiratory: Negative for cough and shortness of breath. Cardiovascular: Negative for chest pain and palpitations. Gastrointestinal: Negative for diarrhea, nausea and vomiting. Skin: Positive for pallor. Negative for rash. Neurological: Negative for dizziness, facial asymmetry, weakness and headaches. Physical Exam:     Physical Exam  Vitals signs (nurse unable to get a pulse ox reading) reviewed. Constitutional:       Appearance: She is ill-appearing. She is not toxic-appearing or diaphoretic. Comments: Pt is in a wheel chair, pale and ill looking   HENT:      Head: Normocephalic and atraumatic. Cardiovascular:      Rate and Rhythm: Normal rate. Pulmonary:      Effort: Pulmonary effort is normal.      Breath sounds: Rhonchi present. Comments: Rhonchi and crackles throughout and bilateral.  Skin:     Findings: No lesion.          Vitals:  BP (!) 100/50   Pulse 56   Ht 5' 2\" (1.575 m)   BMI 25.61 kg/m²       Data:     Lab Results   Component Value Date     12/02/2020    K 3.9 12/02/2020     12/02/2020    CO2 17 12/02/2020    BUN 30 12/02/2020    CREATININE 1.22 12/02/2020    GLUCOSE 126 12/02/2020    GLUCOSE 96 01/09/2012    PROT 7.3 12/02/2020    LABALBU 3.4 12/02/2020    LABALBU 4.3 01/09/2012    BILITOT 0.68 12/02/2020    ALKPHOS 81 12/02/2020    AST 41 12/02/2020    ALT 13 12/02/2020     Lab Results   Component Value Date    WBC 5.5 12/02/2020    RBC 4.21 12/02/2020    HGB 12.1 12/02/2020    HCT 36.5 12/02/2020    MCV 86.9 12/02/2020    MCH 28.7 12/02/2020    MCHC 33.0 12/02/2020    RDW 17.1 12/02/2020     12/02/2020    MPV NOT REPORTED 12/02/2020     Lab Results   Component Value Date    TSH 1.04 12/02/2020     Lab Results   Component Value Date    CHOL 152 06/30/2020    HDL 83 06/30/2020          Assessment/Plan:        1. Weakness  D/w pt and her daughter concern for another infection and even dehydration causing the weakness so pt will be taken to ER    2. Cystitis with hematuria  Urine culture grew out no bacteria so d/w pt and daughter not a UTI    3. Decreased appetite  See #1     4. SOB (shortness of breath)  See #1      Return if symptoms worsen or fail to improve.       Electronically signed by Dawna Howell MD on 12/2/2020 at 11:44 PM

## 2020-12-02 NOTE — ED NOTES
This nurse checks patient and her oxygen per nasal canula was off her nose. RT at [facility]side and oxygen at 4 liters reapplied per nasal canula and patients Spo2 increases from 80% to 86%. Oxygen increased to 6 liters per nasal canula and patient Spo2 is now 93%.         Georgina Melendez RN  12/02/20 1401

## 2020-12-02 NOTE — ED NOTES
Mckayla per Dr. Savage Painter to transfer patient to [facility] without a tele-monitor.        Carleen Pallas, RN  12/02/20 2902

## 2020-12-02 NOTE — ED NOTES
Report called to [address] [place] [address] at [facility].       Ni Carlson RN  12/02/20 1238

## 2020-12-03 PROBLEM — E43 SEVERE MALNUTRITION (HCC): Status: ACTIVE | Noted: 2020-01-01

## 2020-12-03 NOTE — PROGRESS NOTES
Writer is constantly in patients room attemtping to keep patients nonrebreather on her face. Pt will not leave it alone, continuously removing it. Respiratory called and Lisa Dotson RRT states he will set up a true heated high flow machine and see if patient tolerated that better.

## 2020-12-03 NOTE — PROGRESS NOTES
Pharmacy Note  Warfarin Consult    Yimi Smith is a 80 y.o. female for whom pharmacy has been consulted to manage warfarin therapy. Consulting Physician: Annemarie Heck MD  Reason for Admission: fatigue and weakness, COVID positive    Warfarin dose prior to admission: 5mg on Tue, Fri only and 4mg all other days of the week.   (per [facility])  Warfarin indication: Afib  Target INR range: 2-3     Past Medical History:   Diagnosis Date    Acute renal failure (ARF) ([address]     7/2014    Anemia     Aortic stenosis, severe     5/2014 echo    Arthritis     right shoulder    H/O aortic valve replacement 7/29/14    [facility] Dr. April Zhou    Hx of CABG 7/29/14    Hypertension     Hypothyroidism     Nosebleed     Osteoporosis         Recent Labs     12/02/20  0910   INR 4.0     Recent Labs     12/02/20  0910   HGB 12.1   HCT 36.5        Current warfarin drug-drug interactions: acetaminophen, allopurinol, aspirin, dexamethasone, levothyroxine     Date INR Dose   12/2/2020 4.0 HOLD     Notes:  HOLD warfarin today for INR of 4.0. Daily PT/INR while inpatient. Thank you for the consult. Pharmacy will continue to follow.     Hodan Paz RPh, CACP  Clinical Pharmacist Medication Management  12/2/2020  7:39 PM

## 2020-12-03 NOTE — PLAN OF CARE
Problem: Airway Clearance - Ineffective  Goal: Achieve or maintain patent airway  Outcome: Ongoing     Problem: Gas Exchange - Impaired  Goal: Absence of hypoxia  Outcome: Ongoing  Goal: Promote optimal lung function  Outcome: Ongoing     Problem: Breathing Pattern - Ineffective  Goal: Ability to achieve and maintain a regular respiratory rate  Outcome: Ongoing     Problem:  Body Temperature -  Risk of, Imbalanced  Goal: Ability to maintain a body temperature within defined limits  Outcome: Ongoing  Goal: Will regain or maintain usual level of consciousness  Outcome: Ongoing  Goal: Complications related to the disease process, condition or treatment will be avoided or minimized  Outcome: Ongoing     Problem: Isolation Precautions - Risk of Spread of Infection  Goal: Prevent transmission of infection  Outcome: Ongoing     Problem: Nutrition Deficits  Goal: Optimize nutrtional status  Outcome: Ongoing     Problem: Risk for Fluid Volume Deficit  Goal: Maintain normal heart rhythm  Outcome: Ongoing  Goal: Maintain absence of muscle cramping  Outcome: Ongoing  Goal: Maintain normal serum potassium, sodium, calcium, phosphorus, and pH  Outcome: Ongoing     Problem: Loneliness or Risk for Loneliness  Goal: Demonstrate positive use of time alone when socialization is not possible  Outcome: Ongoing     Problem: Fatigue  Goal: Verbalize increase energy and improved vitality  Outcome: Ongoing     Problem: Patient Education: Go to Patient Education Activity  Goal: Patient/Family Education  Outcome: Ongoing     Problem: Nutrition  Goal: Optimal nutrition therapy  Outcome: Ongoing

## 2020-12-03 NOTE — PROGRESS NOTES
Comprehensive Nutrition Assessment    Type and Reason for Visit:  Initial(missing nutrition screen)    Nutrition Recommendations/Plan:   1. Continue current diet. 2. Start 4 oz ensure enlive TID with meals. Nutrition Assessment:   Severe malnutrition r/t insufficient energy intakes aeb weight loss 3.6% x 1 week. PO decrease x 1 week. Glucose 141. Pt has had loss of taste and c/o no appetite to MD 12/2/20. Nurse reports Pt inappropriate for phone call. Malnutrition Assessment:  Malnutrition Status:  Severe malnutrition    Context:  Acute Illness     Findings of the 6 clinical characteristics of malnutrition:  Energy Intake:  7 - 50% or less of estimated energy requirements for 5 or more days  Weight Loss:  7 - Greater than 2% over 1 week(3.6% weight loss x 1 week)     Body Fat Loss:  Unable to assess     Muscle Mass Loss:  Unable to assess    Fluid Accumulation:  No significant fluid accumulation     Strength:  Not Performed    Estimated Daily Nutrient Needs:  Energy (kcal):  7997-1999(03-76/FC); Weight Used for Energy Requirements:  Current     Protein (g):  79-98g(1.3-1.6g/kg); Weight Used for Protein Requirements:  Current        Fluid (ml/day):  1708 ml; Method Used for Fluid Requirements:  1 ml/kcal      Nutrition Related Findings:  unable to assess due to COVID-19      Wounds:  (unable to assess)       Current Nutrition Therapies:    DIET CARDIAC;     Anthropometric Measures:  · Height: 5' 2\" (157.5 cm)  · Current Body Weight: 134 lb 12.8 oz (61.1 kg)   · Admission Body Weight: 134 lb 12.8 oz (61.1 kg)(12/2/20 ED weight)    · Usual Body Weight: 140 lb (63.5 kg)(12/27/20)     · Ideal Body Weight: 110 lbs; % Ideal Body Weight 122.5 %   · BMI: 24.6  · BMI Categories: Normal Weight (BMI 22.0 to 24.9) age over 72       Nutrition Diagnosis:   · Severe malnutrition, In context of acute illness or injury related to inadequate protein-energy intake as evidenced by poor intake prior to admission, weight loss greater than or equal to 2% in 1 week(decreased PO since Thanksgiving)      Nutrition Interventions:   Food and/or Nutrient Delivery:  Continue Current Diet, Start Oral Nutrition Supplement(4 oz ensure enlive TID with meals)  Nutrition Education/Counseling:  Education not appropriate   Coordination of Nutrition Care:  Continue to monitor while inpatient    Goals:  PO > 75% of meals and supplements     Recent Labs     12/02/20  0910 12/03/20  0620    141   K 3.9 4.0    105   CO2 17* 23   BUN 30* 23   CREATININE 1.22* 0.84   GLUCOSE 126* 141*   ALT 13 10   ALKPHOS 81 70   GFR       NOT REPORTED                 Lab Results   Component Value Date    LABALBU 3.6 12/03/2020    LABALBU 4.3 01/09/2012      Nutrition Monitoring and Evaluation:   Behavioral-Environmental Outcomes:  Knowledge or Skill   Food/Nutrient Intake Outcomes:  Food and Nutrient Intake, Supplement Intake  Physical Signs/Symptoms Outcomes:  Biochemical Data, Weight     Discharge Planning:    Continue current diet     Electronically signed by Shweta Rodriguez RD, LD on 12/3/20 at 11:02 AM EST    Contact: [phone]

## 2020-12-03 NOTE — PROGRESS NOTES
Patient is awake and alert at this time. Pt attempted to be proned but patient is confused and will not follow directions. Pt also pushed and fights against staff when attempted to be moved at this time.

## 2020-12-03 NOTE — CONSULTS
[facility]] Pharmacy Department    Clinical Pharmacy Note    Warfarin consult follow-up    INR (no units)   Date Value   12/03/2020 4.7 (HH)   12/02/2020 4.3   12/02/2020 4.0   11/27/2020 1.8   10/20/2020 2.6   09/15/2020 2.9   08/04/2020 2.6       Hemoglobin (g/dL)   Date Value   12/03/2020 11.0 (L)   12/02/2020 12.1   11/27/2020 12.4     Hematocrit (%)   Date Value   12/03/2020 34.9 (L)   12/02/2020 36.5   11/27/2020 37.0     Platelets (k/uL)   Date Value   12/03/2020 240   12/02/2020 234   11/27/2020 119 (L)       Target INR Range: 2-3    Significant Drug-Drug Interactions:  New warfarin drug-drug interactions: macrobid, remdesivir, dexamethasone  Discontinued drug-drug interactions: none      Notes:                   Continue to hold Coumadin for supra-therapeutic INR. Daily PT/INR until stable within therapeutic range.    Thank you for the consult,  Serenity Parry, 12/3/2020, 11:01 AM

## 2020-12-03 NOTE — PROGRESS NOTES
RESPIRATORY ASSESSMENT PROTOCOL                                                                                              Patient Name: Christie De La Torre Room#: 6141/1359-15 : 1/10/1931     Admitting diagnosis: COVID-19 virus detected [U07.1]       Medical History:   Past Medical History:   Diagnosis Date    Acute renal failure (ARF) ([facility] [address]     2014    Anemia     Aortic stenosis, severe     2014 echo    Arthritis     right shoulder    H/O aortic valve replacement 14    [facility] Dr. Ross Morrow Hx of CABG 14    Hypertension     Hypothyroidism     Nosebleed     Osteoporosis        PATIENT ASSESSMENT    LABORATORY DATA  Hematology:   Lab Results   Component Value Date    WBC 5.5 2020    RBC 4.21 2020    HGB 12.1 2020    HCT 36.5 2020     2020     Chemistry:  No results found for: PHART, MNU5LMG, PO2ART, H2JCDCLW, GUY1ADM, PBEA    Blood Culture:   Sputum Culture:     VITALS  Pulse: 84   Resp: 22  BP: (!) 143/60  SpO2: 94 % O2 Device: Non-rebreather mask  Temp: 97.9 °F (36.6 °C)  Comment:   SKIN COLOR  [x] Normal  [] Pale  [] Dusky  [] Cyanotic      RESPIRATORY PATTERN  [x] Normal  [] Dyspnea  [] Cheyne-Ramos  [] Kussmaul  [] Biots  AMBULATORY  [] Yes  [] No  [x] With Assistance    PEAK FLOW  Predicted:     Personal Best:        VITAL CAPACITY  Predicted value:  ml  Actual Value:  ml  30% of Predicted:  ml  Patient Acuity 0 1 2 3 4 Score   Level of Concious (LOC) []  Alert & Oriented or Pt normal LOC [x]  Confused;follows directions []  Confused & uncooper-ative []  Obtunded []  Comatose 1   Respiratory Rate  (RR) []  Reg. rate & pattern. 12 - 20 bpm  [x]  Increased RR.  Greater than 20 bpm   []  SOB w/ exertion or RR greater than 24 bpm []  Access- ory muscle use at rest. Abn.  resp. []  SOB at rest.   1   Bilateral Breath Sounds (BBS) []  Clear []  Diminish-ed bases  [x]  Diminish-ed t/o, or rales   []  Sporadic, scattered wheezes or rhonchi []  Persistentwheezes and, or absent BBS 2   Cough [x]  Strong, effective, & non-prod. []  Effective & prod. Less than 25 ml (2 TBSP) over past 24 hrs []  Ineffective & non-prod to less than 25 ML over past 24 hrs []  Ineffective and, or greater than 25 ml sputum prod. past 24 hrs. []  Nonspon- taneous; Requires suctioning 0   Pulmonary History  (PULM HX) []  No smoking and no chronic pulmonaryhistory []  Former smoker. Quit over 12 mos. ago []  Current smoker or quit w/ in 12 mos []  Pulm. History and, or 20 pk/yr smoking hx [x]  Admitted w/ acute pulm. dx and, or has been admitted w/ pulm. dx 2 or more times over past 12 mos 4   Surgical History this Admit  (SURG HX) [x]  No surgery []  General surgery []  Lower abdominal []  Thoracic or upper abdominal   []  Thoracic w/ pulm. disease 0   Chest X-Ray (CXR)/CT Scan []  Clear or not applicable []  Not available []  Atelect- asis or pleural effusions []  Localized infiltrate or pulm. edema [x]  Con-solidated Infiltrates, bilateral, or in more than 1 lobe 4   Slow or Forced VC, FEV1 OR PEFR (PULM FXN)  [x]  80% or greater, or not indicated []  Pt. unable to perform []  FEV1 or PEFR or VC 51-79%. []  FEV1 or PEFR or VC  30-49%   []  FEV1 or PEFR or VC less than 30%   0   TOTAL ACUITY: 12       CARE PLAN    If Acuity Level is 2, 3, or 4 in any of the following:    [x] BILATERAL BREATH SOUNDS (BBS)     [] PULMONARY HISTORY (PULM HX)  [x] PULMONARY FUNCTION (PULM FX)    Goal: Improve respiratory functions in patients with airway disease and decrease WOB    [x] AEROSOL PROTOCOL    Total Acuity:   16-32  []  Secondary Assessment in 24 hrs Total Acuity:  9-15  [x]  Secondary Assessment in 24 hrs Total Acuity:  4-8  []  Secondary Assessment in 48 hrs Total Acuity:  0-3  []  Secondary Assessment in 72 hrs   HHN AEROSOL THERAPY with  [physician-ordered bronchodilator(s)] q 4 & Albuterol PRN q2 hrs. Breath-Actuated Neb if BBS Acuity = 4, and pt. can use MP.  Notify physician given:  ____Yes  ____No ____Patient Fracisco Smack

## 2020-12-03 NOTE — PROGRESS NOTES
Pt still not attempting to get up out of bed at this time but continues to take off her nonrebreather mask and SPO2 monitor. Pt continues to be educated on the need to leave both in place. Pt verbalizes understanding at this time.

## 2020-12-03 NOTE — PROGRESS NOTES
Jennie Abreu CNP and Dr. Armand Tsai notified of critical INR level of 4.7. No new orders received at this time.

## 2020-12-03 NOTE — PROGRESS NOTES
Bed alarm going off. Writer entered room and patient trying to get out of room. Non-rebreather on floor. Writer assisted patient back to bed and reapplied non-rebreather. Pt disoriented x3. Call light and belongings within reach. Bed alarm on.

## 2020-12-03 NOTE — PROGRESS NOTES
SPO2 running 86-87% on 15L high flow. Respiratory called and Inezn Geovanna RRT states to put her back on the nonrebreather. Pt placed on the nonrebreather and before writer left the room her SPO2 was running 90% on 24L.

## 2020-12-03 NOTE — PROGRESS NOTES
Patient SPO2 alarming 68-70% at the nurses station. Upon entering the room, pt had the High low out of her nose. Pt fought writer about putting it back in her nose. Pt swatted at [employer] and cussed at [employer] as well. High flow managed to be put back in her nose. Pt left it alone at this time.

## 2020-12-03 NOTE — PROGRESS NOTES
For the past 1.5 hours, this writer has not been able to be out of the patients room for longer than 2 minutes without patient taking her nonrebreather off or her SPO2 monitor. Patient is agitated when writer reeducated the necessity of leaving both in place. Writer even entered the room to patient having her legs over the side rails attempting to get out of bed. Pt assessed and vitals obtained at this time as charted. Pt continue to pull at her heart monitor, nonrebreather and SPO2 sensor. PRN ativan given at this time due to increase agitation in patient. Will continue to monitor.

## 2020-12-03 NOTE — PROGRESS NOTES
[facility]. Per squad report, pt was on 6L NC and SpO2 between 85-88%. EMS put on non-rebreather. Respiratory notified.

## 2020-12-03 NOTE — PROGRESS NOTES
Patients SPO2 alarming in the 60's-70's again at the nurses station. Writer entered the room to patient not having her high flow in place. Writer attempted to reapplied high flow and patient continues to pull it out, swat at [facility] and shake her head back and forth making it impossible to reapply high flow. Soft spot noted to the back of her head as well. [facility] RN enters the room to assist and patient still refuses to allow high flow to be place. For the time being, nonrebreather held up to patients face to the best of writers ability so some oxygen can be received. [facility] RN notifies Sandra Eng CNP and she is at bedside. She attempted to apply high flow and patient fights Sandra Eng CNP. Soft wrist restrains applied per verbal order per Sandra Eng CNP at this time. High flow placed back in proper position. Sandra Eng CNP is also notified of new soft spot noted to the back of her head.

## 2020-12-03 NOTE — PROGRESS NOTES
Daughter, Jack Yañez, called at this time. Will see if she can get a ride to sit with patient. Approved by NS.

## 2020-12-03 NOTE — PROGRESS NOTES
Patient noted to continue to pull off her nonrebreather mask. Pt educated on the need to keep it in her nose. Pt verbalizes understanding but continues to pull at it. Pt not attempting to get out of bed this morning. Pt is alert but confused. Pt is oriented to person only. Pt respirations are unlabored but tachypneic. SPO2 running at 66% on room air. Nonrebreather mask reapplied and SPO2 was 88% after 10 minutes, but patient continues to take the mask off even with writer in the room. Respiratory made aware and states to put her on high flow to see is she leaves it alone. High flow applies to patient at this time. Will continue to monitor.

## 2020-12-03 NOTE — PROGRESS NOTES
EV spoke with pt's dtr Mike Paz to complete assessment. Pt is a 80year old female admitted for covid 19. Pt lives with her dtr Mike Paz in their home in [place]. Pt was not utilizing any services prior to admission. Pt has a walker and shower chair that she uses at home. Pt's dtr transports her to appointments. Pt is a full code and follows with Dr Valentina Cooley as PCP. Pt does not have advance directives according to Mike Paz. Pt has 4 children still living, 2 have passed. EV and Mike Paz discussed state of [place] with decision making when no documents exist. Mike Paz states that she is not the oldest but she has been making decisions because pt lives with her. ACP note completed with pt yesterday reviewed with dtr and answers remain the same. Dtr reports that medications are affordable for pt. Dtr plans for pt to return home with her at discharge. Dtr would be open to additional services if needed for care. EV will continue to follow.  Zuleyma MOEW 12/3/2020

## 2020-12-03 NOTE — PROGRESS NOTES
Writer heard pulse ox alarming. Writer entered room and patient had nonrebreather on the floor and IV pulled out. O2 reapplied. IV attempted at this time unsuccessfully. Will contact NS to re-establish IV access.

## 2020-12-04 NOTE — PROGRESS NOTES
Writer into room to feed patient breakfast. Pt took 3 bites of oatmeal and drank half her orange juice and declined anymore. Pt offered to use the [facility] and again declines. Will continue to monitor.

## 2020-12-04 NOTE — PROGRESS NOTES
[id number] [facility]    Clinical Pharmacy Note    Warfarin consult follow-up    INR (no units)   Date Value   12/04/2020 6.3 (HH)   12/03/2020 4.7 (HH)   12/02/2020 4.3   12/02/2020 4.0   11/27/2020 1.8   10/20/2020 2.6   09/15/2020 2.9       Hemoglobin (g/dL)   Date Value   12/04/2020 11.0 (L)   12/03/2020 11.0 (L)   12/02/2020 12.1     Hematocrit (%)   Date Value   12/04/2020 34.9 (L)   12/03/2020 34.9 (L)   12/02/2020 36.5     Platelets (k/uL)   Date Value   12/04/2020 261   12/03/2020 240   12/02/2020 234       Target INR Range: 2.0-3.0    Significant Drug-Drug Interactions:  New warfarin drug-drug interactions: Patient to receive VITAMIN K 2.5mg PO once at this time, continues macrobid, remdesivir, dexamethasone  Discontinued drug-drug interactions: no change      Notes:   Continue to hold warfarin at this time. Patient to receive vitamin K 2.5mg po at this time. D-Dimer increased from 1.83 to 3.65 to greater than 20 over last 48 hours. Daily PT/INR until stable within therapeutic range.    Thanks, Daija Pandey, R.Ph., 12/4/2020,11:59 AM

## 2020-12-04 NOTE — PROGRESS NOTES
06/04/14); Appendectomy; Aortic valve replacement (7/29/14); and Hysterectomy. Restrictions  Restrictions/Precautions  Restrictions/Precautions: General Precautions, Fall Risk, Isolation  Vision/Hearing  Hearing: Exceptions to [facility]  Hearing Exceptions: Hard of hearing/hearing concerns     Subjective  General  Chart Reviewed: Yes  Patient assessed for rehabilitation services?: Yes  Referring Practitioner: Antonette Montalvo MD  Referral Date : 12/03/20  Diagnosis: COVID 19 Virus  Pain Screening  Patient Currently in Pain: Denies          Orientation  Orientation  Overall Orientation Status: Impaired(patient very confused requiring frequent re-directions as to why patient was in hospital)  Social/Functional History  Social/Functional History  Lives With: Daughter  Type of Home: House  Bathroom Shower/Tub: Shower chair with back  Home Equipment: Rolling walker  ADL Assistance: Needs assistance  Homemaking Assistance: Needs assistance  Homemaking Responsibilities: No  Active : No  Additional Comments: Pt very poor historian. Information gathered from medical review and SW note. Cognition        Objective     Observation/Palpation  Observation: patient arm restraints in place upon therapist arrival, patient on high flow oxygen    AROM RLE (degrees)  RLE AROM: WFL  AROM LLE (degrees)  LLE AROM : WFL  Strength RLE  Comment: grossly 3+/5  Strength LLE  Comment: grossly 3+/5        Bed mobility  Supine to Sit: Moderate assistance  Sit to Supine: Moderate assistance  Scooting: Contact guard assistance  Transfers  Sit to Stand: Moderate Assistance(modAx2 from [facility], modAx1 from edge of bed)  Ambulation  Ambulation?: Yes  Ambulation 1  Surface: level tile  Device: No Device  Assistance: Moderate assistance(modAx2)  Gait Deviations: Shuffles  Distance: 2 steps x2 to stand pivot to bed side commode.   Comments: patient very unsteady when standing and constant cues for hand placement for safe transfers     Balance  Sitting - Static: Fair  Sitting - Dynamic: Fair;-  Standing - Static: Poor;-  Standing - Dynamic: Poor;-        Plan   Plan  Times per week: 7 times per week  Times per day: Twice a day(1 time per day on weekends)  Current Treatment Recommendations: Strengthening, Balance Training, Functional Mobility Training, Transfer Training, Gait Training, Neuromuscular Re-education, Home Exercise Program, Safety Education & Training, Patient/Caregiver Education & Training, Equipment Evaluation, Education, & procurement, Manual Therapy - Soft Tissue Mobilization, Positioning  Safety Devices  Type of devices: Call light within reach, Left in bed, Nurse notified, Bed alarm in place(arm restraints reapplied)      AM-PAC Score     AM-PAC Inpatient Mobility without Stair Climbing Raw Score : 11 (12/04/20 1126)  AM-PAC Inpatient without Stair Climbing T-Scale Score : 35.66 (12/04/20 1126)  Mobility Inpatient CMS 0-100% Score: 67.36 (12/04/20 1126)  Mobility Inpatient without Stair CMS G-Code Modifier : CL (12/04/20 1126)       Goals  Short term goals  Time Frame for Short term goals: 10 visits  Short term goal 1: Patient will demonstrate the ability to ambulate 25 feet with RW with minimal assistance in order to ease ADLs  Short term goal 2: Patient will demonstrate the ability to complete bed mobility and transfers with minimal assistance in order to ease functional mobility  Short term goal 3: Patient will demonstrate fair (+) dynamic sitting balance in order to reduce fall risk  Short term goal 4: Patient will tolerate 35-45' ther-ex in order to increase endurance and ease ADLs       Therapy Time   Individual Concurrent Group Co-treatment   Time In 0815         Time Out 0835         Minutes 20                 Brenden Shelton, PT, DPT

## 2020-12-04 NOTE — PROGRESS NOTES
Progress Note    SUBJECTIVE:  F/u on hypoxia    OBJECTIVE:  Resting in bed alert but disoriented. States she is not hungry. No distress. SPO2 drops with out oxygen on to 66%. She is currently on 45L wit 93% FIO2. Vitals:   Vitals:    12/04/20 1700   BP: (!) 161/65   Pulse: 68   Resp: 22   Temp:    SpO2: 91%     Weight: 134 lb 3.2 oz (60.9 kg)   Height: 5' 2\" (157.5 cm)   -----------------------------------------------------------------  Exam:    CONSTITUTIONAL:  awake, alert, cooperative and no apparent distress  EYES:  Lids and lashes normal, pupils equal, round and reactive to light, extra ocular muscles intact, sclera clear, conjunctiva normal  HENT:  Normocephalic, soft fluid area to the top occipital area, without obvious abnormality, atraumatic, sinuses nontender on palpation, external ears without lesions, oral pharynx with moist mucus membranes, tonsils without erythema or exudates, gums normal and good dentition. NECK:  Supple, symmetrical, trachea midline, no adenopathy, thyroid symmetric, not enlarged and no tenderness, skin normal  HEMATOLOGIC/LYMPHATICS:  no cervical lymphadenopathy and no supraclavicular lymphadenopathy  LUNGS:  no increased work of breathing, decreased air exchange and crackles right base and left base  CARDIOVASCULAR:  Normal apical impulse, regular rate and rhythm, normal S1 and S2, no S3 or S4, and no murmur noted  ABDOMEN:  No scars, normal bowel sounds, soft, non-distended, non-tender, no masses palpated, no hepatosplenomegally  MUSCULOSKELETAL:  There is no redness, warmth, or swelling of the joints. Full range of motion noted. Motor strength is 5 out of 5 all extremities bilaterally.   Tone is normal.  NEUROLOGIC:  Mental Status Exam:  Level of Alertness:   awake  Orientation:   person  Memory:   abnormal - dementia  SKIN:  no bruising or bleeding, normal skin color, texture, turgor, no redness, warmth, or swelling, no rashes and no lesions  EXT:     no cyanosis, clubbing or edema present    -----------------------------------------------------------------  Diagnostic Data: Reviewed    ASSESSMENT:   Principal Problem:    COVID-19 virus detected  Active Problems:    Severe malnutrition (HCC)    Essential hypertension, benign    Chronic renal insufficiency    Chronic atrial fibrillation (HCC)    Acute respiratory failure with hypoxia (HCC)  Resolved Problems:    * No resolved hospital problems. *        PLAN:  · Covid 19 / Acute Resp Failure  ? Tele with continuous SPO2  ? Dexamethasone  ? IV Remdeisivir  ? Supplemental oxygen to maintain SPO2 > 92%  ? Acapella  ? Proning  ? Labs daiy  ? Restraints in place to keep oxygen on   ? Appreciate Palliative care to talk with family  ? · UTI  ?  Continue Macrobid  · High risk medication monitoring: Remdisivir      Porsha Lea , ERICA, NP-C

## 2020-12-04 NOTE — PROGRESS NOTES
Range of motion done at this time. Patient immediately tries to remove high flow and burgos catheter. Restraints reapplied to wrists. Will continue to monitor.

## 2020-12-04 NOTE — PROGRESS NOTES
Patient transferred to ICU at this time. High flow in place. Patient confused and only oriented to person. Lung sounds are diminished throughout. Carina Denis RN supervisor at bedside. See orders.

## 2020-12-04 NOTE — PROGRESS NOTES
Writer entered the room to continuous pulse ox alarming, SpO2 is at 76% due to patient having the high flow off. Patient remains in restraints but agitated and restless moving all over the bed. Attempts are made to calm patient but all unsuccessful. The high flow was placed back into both nares and after several minutes Sp)2 returned to 93%. Patient is repositioned, a drink, and the toliet was offered but all were declined.

## 2020-12-04 NOTE — H&P
History and Physical    Patient:  Regi Hernandez  MRN: [id number]    Chief Complaint:  fatigue    History Obtained From:  electronic medical record, reason patient could not give history:  altered mental status and no family present    PCP: Damaris Noble MD    History of Present Illness: The patient is a 80 y.o. female who presented to [facility] ER from Dr. Alina Peacock office with increased weakness and not wanting to eat per the daughter. Per her PCP she is normally very active. Her symptoms were reported to have started on 11/25. The patient did report some SOB. She currently is being treated for UTI on Macrobid. She was evaluated in the ER and transferred to us last evening with Covid 19. Today she is resting in bed with oxygen on pleasantly confused. Afebrile. Denies complaints of pain. Denies n/v/d. Past Medical History:        Diagnosis Date    Acute renal failure (ARF) ([facility] [address]     7/2014    Anemia     Aortic stenosis, severe     5/2014 echo    Arthritis     right shoulder    H/O aortic valve replacement 7/29/14    [facility] Sign[facility] Hx of CABG 7/29/14    Hypertension     Hypothyroidism     Nosebleed     Osteoporosis        Past Surgical History:        Procedure Laterality Date    AORTIC VALVE REPLACEMENT  7/29/14    [place], Dr. Renae Mccallum [facility] 06[facility]04/14    [facility] disease in the ostium of the right coronary artery followed by a 60% lesion in the midportion. Unremarkable left anterior descending &circumflex. Normal left ventricular function,ejection fraction of 60%. Normal left ventricular function, ejection fraction of 60%. Severe aortic stenosis with a 100 mm gradient across the aortic valve with an aortic valve area of    CARDIAC CATHETERIZATION Left 06/04/14    0.4 cm2. Moderate pulmonary hypertension with a pulmonary artery pressure of 50/30.     CARPAL TUNNEL RELEASE      bilateral     HYSTERECTOMY      PARTIAL HYSTERECTOMY         Family History:       Problem Relation Age of Onset    Heart Disease Brother        Social History:   TOBACCO:   reports that she quit smoking about 38 years ago. Her smoking use included cigarettes. She has a 35.00 pack-year smoking history. She has never used smokeless tobacco.  ETOH:   reports no history of alcohol use. ELICIT DRUG USE:    Social History     Substance and Sexual Activity   Drug Use No     OCCUPATION: Retired      Allergies:  Codeine    Medications Prior to Admission:    Prior to Admission medications    Medication Sig Start Date End Date Taking? Authorizing Provider   nitrofurantoin, macrocrystal-monohydrate, (MACROBID) 100 MG capsule Take 1 capsule by mouth 2 times daily for 10 days 11/28/20 12/8/20  Armond Denver, MD   meclizine (ANTIVERT) 12.5 MG tablet 90Take 1 tablet by mouth 3 times daily as needed for Dizziness 11/24/20   Delmy Shah MD   allopurinol (ZYLOPRIM) 100 MG tablet TAKE 1 TABLET BY MOUTH TWICE DAILY 11/13/20   ERICA Rivas - CNP   atorvastatin (LIPITOR) 10 MG tablet Take 1 tablet by mouth nightly 9/11/20   Delmy Shah MD   levothyroxine (SYNTHROID) 75 MCG tablet TAKE 1 TABLET BY MOUTH DAILY 9/1/20   Jacqueline Quinn MD   magnesium oxide (MAG-OX) 400 MG tablet Take 1 tablet by mouth daily 7/16/20   Delmy Shah MD   warfarin (COUMADIN) 5 MG tablet TAKE 1 TABLET BY MOUTH EVERY DAY on Tuesdays and Fridays only - - take FOUR mg all other days or AS DIRECTED 7/16/20   Delmy Shah MD   warfarin (COUMADIN) 2 MG tablet Take 2 tablets on Sundays, Mondays, Wednesdays, Thursdays, and Saturdays (total of 4 mg) and 1 of the 5 mg tablets on Tuesdays and Fridays or as directed.  Managed by [address] 7/16/20   Delmy Shah MD   losartan (COZAAR) 50 MG tablet Take ONE-HALF tablet by mouth 2 (TWO) times daily 7/16/20   Delmy Shah MD   metoprolol tartrate (LOPRESSOR) 25 MG tablet TAKE 1 TABLET BY MOUTH TWICE DAILY 7/16/20   Pat Gibson MD   dilTIAZem (CARTIA XT) 120 MG extended release capsule TAKE 1 CAPSULE BY MOUTH EVERY DAY 6/12/20   Pat Gibson MD   sodium chloride (OCEAN, BABY AYR) 0.65 % nasal spray 1 spray by Nasal route as needed for Congestion    Historical Provider, MD   clobetasol (TEMOVATE) 0.05 % ointment Apply fingertip amount to perineal/vaginal opening twice a week for lichen sclerosis 53/55/81   ERICA Rangel - CNP   acetaminophen (TYLENOL) 500 MG tablet Take 500-1,000 mg by mouth every 6 hours as needed for Pain    Historical Provider, MD   ARTIFICIAL TEAR SOLUTION OP Apply 2 drops to eye 2 times daily as needed (dry eyes)  7/18/17   Kimi ROCHA Procaccjames   aspirin 81 MG EC tablet Take 81 mg by mouth daily. Historical Provider, MD   docusate sodium (COLACE) 100 MG capsule Take 100-200 mg by mouth daily as needed for Constipation     Historical Provider, MD       Review of Systems: Per the ER Chart  Constitutional:negative  for fevers, and negative for chills. Positive fatigue  Respiratory: positive for shortness of breath, negative for cough, and negative for wheezing  Cardiovascular: negative for chest pain  Gastrointestinal: negative for abdominal pain, negative for nausea,negative for vomiting, negative for diarrhea  Genitourinary: negative for dysuria  Neurological: Positive weakness    Physical Exam:    Vitals:   Temp: 98.1 °F (36.7 °C)  BP: (!) 155/56  Resp: 22  Pulse: 86  SpO2: 93 %  24HR INTAKE/OUTPUT:      Intake/Output Summary (Last 24 hours) at 12/3/2020 1928  Last data filed at 12/3/2020 1252  Gross per 24 hour   Intake 220 ml   Output 450 ml   Net -230 ml       Exam:  GEN:  well-developed and well nourished, in no acute distress, alert, disoriented and frail-appearing  EYES: No gross abnormalities. , PERRL and EOMI  NECK: normal, supple, no lymphadenopathy,  no carotid bruits  PULM: rales present- mid and lower bases  COR: no gallops, irregularly irregular, S1 normal in the sinuses, middle ear cavities, or mastoids. A fatty mass is seen in the right parietal scalp compatible with lipoma. This is unchanged. There is atherosclerotic calcification of the paraclinoid internal carotid arteries. 1. Senescent changes in the brain. 2. No intracranial hemorrhage, mass, or other acute findings that would account for weakness. Xr Chest Portable    Result Date: 12/2/2020  EXAM: XR CHEST PORTABLE HISTORY: Reason for exam:->hypoxia, COVID positive. COMPARISON: 6/30/2020. TECHNIQUE: Single view. FINDINGS: Prior mediastinotomy. There are peripheral airspace infiltrates most prominent in the mid lungs, left greater than right. This is new. No pneumothorax, cavity or effusion. Bilateral multifocal infiltrates in the mid lungs, left greater than right. Findings compatible with multifocal pneumonitis. Assessment:    Principal Problem:    COVID-19 virus detected  Active Problems:    Severe malnutrition (HCC)    Essential hypertension, benign    Chronic renal insufficiency    Chronic atrial fibrillation (HCC)    Acute respiratory failure with hypoxia (HCC)  Resolved Problems:    * No resolved hospital problems.  *      Patient Active Problem List    Diagnosis Date Noted    Severe malnutrition ([address] 12/03/2020     Priority: Medium     Class: Present on Admission    COVID-19 virus detected 12/02/2020    Acute respiratory failure with hypoxia (HCC) 12/02/2020    Chronic atrial fibrillation ([address] 02/07/2019    Vitamin D deficiency disease 03/30/8215    Lichen sclerosus of female genitalia 07/23/2015    Hx of CABG 09/22/2014    H/O aortic valve replacement 07/29/2014    Personal history of atrial flutter 07/06/2014    Long term current use of anticoagulant therapy 06/12/2014    History of DVT of lower extremity 06/12/2014    Aortic stenosis, severe 05/27/2014    Anemia, normocytic.  05/27/2014    Chronic renal insufficiency 05/27/2014    Neck pain 05/27/2014    Hypothyroidism 10/22/2011    Migraine with aura 10/22/2011    Agoraphobia 10/22/2011    Rosacea 10/22/2011    Essential hypertension, benign 10/22/2011    Senile osteoporosis 10/22/2011       Plan:     · This patient requires inpatient admission because of Covid 19  · Factors affecting the medical complexity of this patient include Acute resp failure  · Estimated length of stay is 4 days  · Covid 19 / Acute Resp Failure  · Tele with continuous SPO2  · Dexamethasone  · IV Remdeisivir  · Supplemental oxygen to maintain SPO2 > 92%  · Acapella  · Proning  · UTI  · Continue Macrobid  · High risk medication monitoring: Remdisivir    CORE MEASURES  DVT prophylaxis: Lovenox  Decubitus ulcer present on admission: No  CODE STATUS: FULL CODE  Nutrition Status: good   Physical therapy: Yes   Old Charts reviewed: Yes  EKG Reviewed:  Yes  Advance Directive Addressed: Yes    ERICA Anderson, NP-C  12/3/2020, 7:28 PM

## 2020-12-04 NOTE — CONSULTS
Palliative Care Notes    Reason for Consult:  goals of care    Patient Active Problem List   Diagnosis    Hypothyroidism    Migraine with aura    Agoraphobia    Rosacea    Essential hypertension, benign    Senile osteoporosis    Aortic stenosis, severe    Anemia, normocytic.  Chronic renal insufficiency    Neck pain    Long term current use of anticoagulant therapy    History of DVT of lower extremity    Personal history of atrial flutter    H/O aortic valve replacement    Hx of CABG    Lichen sclerosus of female genitalia    Vitamin D deficiency disease    Chronic [facility] fibrillation ([facility] [address]    COVID-19 virus detected    Acute respiratory failure with hypoxia (HCC)    Severe malnutrition ([facility] [address]       Advance Directives:  Code status: Prior  Patient has capacity for medical decisions: no  Health Care Power of : no  Living Will: no        Pain Management:  The patient is not having any pain. Symptom Management:  Are there any other symptoms that are distressing to the patient or family that needs addressed? Anxiety:  AVERY                          Dyspnea:  acute dyspnea and currently on heated high flow oxygen                          Fatigue:  exercise intolerance                          Bladder function:  None                          Bowel function:  poor appetite    Other:  None     Spiritual history/needs:   notified: Patient confused at this time    Palliative Performance Scale:  ___70%  Ambulation reduced; Some disease; Can't do normal job or work; intake normal or reduced; can do full self care; LOC full  ___60%  Ambulation reduced; Significant disease; Can't do hobbies/housework; intake normal or reduced; occasional assist; LOC full/confusion  ___50%  Mainly sit/lie;  Extensive disease; Can't do any work; Considerable assist; intake normal or reduced; LOC full/confusion  ___40%  Mainly in bed; Extensive disease; Mainly assist; intake normal or reduced; LOC full/confusion   __x_30%  Bed Bound; Extensive disease; Total care; intake reduced; LOCfull/confusion  ___20%  Bed Bound; Extensive disease; Total care; intake minimal; Drowsy/coma  ___10%  Bed Bound; Extensive disease; Total care; Mouth care only; Drowsy/coma  ___0       Death    Readmission Risk:  21%    Notes:   Miryam Singh is currently being moved to the ICU and is confused. Visit held with her daughter by phone. Miryam Singh presented to the emergency room after a physician visit for weakness and decreased appetite. She has a history hypertension and CAD. Miryam Singh has lived with her daughter Jeorme Mai for the last few weeks. Prior to that she lived at home alone. Jerome Mai states that she moved in with her because she was falling frequently and had not wanted to eat. Discussed Laura's current status; heated high flow, restraints, increased confusion, etc. Jerome Mai asks when her mother will be able to come home. Again discussed the above and that plans were being made to move her to the ICU as she is very ill. Discusssed ACP activator questions with daughter. Miryam Singh does not have a living will or [address]. Jerome Mai states that she wants her mother to remain a full code per her mothers wishes and that she has been making medical decisions for Miryam Singh as they live together. Reports that her family is supportive of this. Requests to come in and see her mother. Discussed Covid positive patients are not being allowed visitors at this time and alternatives to an in person visit, phone call, face time, zoom meeting. Jerome Mai declines these at this time. Support provided. Again asks when her mother will be able to come home. We discuss that Miryam Singh is very ill and will be moved to the ICU. Jerome Mai denies further questions. Encouraged to call for further needs. Will continue to follow and support.      Palliative Care Plan:  Education/support to family  Caregiver support/education  Continue with current plan of care  Code status clarified: Full Code  Palliative Care Goals:  improve or maintain function/quality of life, strengthening relationships and support for family/caregiver  Visit focus:  Routine meeting  Functional decline  Discuss goals of care  Treatment options/plans  Provide clinical updates and answer questions  Share information and provider education for the family  Listen to patient/family concerns  Assess family understanding, concerns, and coping  Interdiscplinary collaboration  Address patient/family distress  Build trust  Provide emotional support to the family  Elicit patient's goals and values (through substituted judgment of a surrogate decision maker), and use these to establish or modify goals of care    Aggie Odonnell Nurse Coordinator  12/4/2020 9:35 AM

## 2020-12-04 NOTE — ACP (ADVANCE CARE PLANNING)
Pt attended cardiac rehab phase II exercise session.  Exercise and vitals documented in [facility] System.     
completed  [] Existing advance directive reviewed with patient; no changes to patient's previously recorded wishes  [] New Advance Directive completed  [] Portable Do Not Rescitate prepared for Provider review and signature  [] POLST/POST/MOLST/MOST prepared for Provider review and signature      Follow-up plan:    [] Schedule follow-up conversation to continue planning  [] Referred individual to Provider for additional questions/concerns   [] Advised patient/agent/surrogate to review completed ACP document and update if needed with changes in condition, patient preferences or care setting    [x] This note routed to one or more involved healthcare providers

## 2020-12-04 NOTE — PROGRESS NOTES
Occupational Therapy   Occupational Therapy Initial Assessment  Date: 2020   Patient Name: Aleksandr Ramirez  MRN: [id number]     : 1/10/1931    Date of Service: 2020    Discharge Recommendations:  Continue to assess pending progress, Subacute/Skilled Nursing Facility       Assessment   Performance deficits / Impairments: Decreased functional mobility ; Decreased endurance;Decreased coordination;Decreased ADL status; Decreased balance;Decreased strength;Decreased safe awareness;Decreased cognition  Assessment: Pt is an 80year old female admitted secondary to COVID-19. Pt presents with decreased cognition and increased confusion and is a poor historian of medical and social/functional history. Pt in bilateral wrist restraints at time of evaluation secondary to attempting to pull out O2 tubing. Pt completed bed mobility with modA, and functional transfers wtih CGA-Lila. Pt with increased difficulty to follow simple one step commands/directions and requires increased cueing. Decreased independence wtih ADL and functional tasks demonstrated during evaluation. Pt would benefit from skilled occupational therapy services to address these deficits and improve ADL independence and particiaption in functional tasks. Treatment Diagnosis: Generalized weakness  Prognosis: Fair  Decision Making: Medium Complexity  OT Education: OT Role;Plan of Care;Transfer Training  Patient Education: Pt provided with verbal cueing for hand placement and appropriate completion of transfers. Barriers to Learning: decreased cognition. REQUIRES OT FOLLOW UP: Yes  Activity Tolerance  Activity Tolerance: Treatment limited secondary to decreased cognition  Activity Tolerance: Pt very confused throughout evaluation. Safety Devices  Safety Devices in place: Yes  Type of devices: Call light within reach;Nurse notified; Bed alarm in place; Left in bed  Restraints  Initially in place: Yes  Restraints: bilateral wrist restraints put back on following evaluation per nurse Shilpi Jean RN) request.           Patient Diagnosis(es): There were no encounter diagnoses. has a past medical history of Acute renal failure (ARF) ([address] Anemia, Aortic stenosis, severe, Arthritis, H/O aortic valve replacement, Hx of CABG, Hypertension, Hypothyroidism, Nosebleed, and Osteoporosis. has a past surgical history that includes partial hysterectomy (cervix not removed); Carpal tunnel release; Cardiac catheterization (Left, 06/04/14); Cardiac catheterization (Left, 06/04/14); Appendectomy; Aortic valve replacement (7/29/14); and Hysterectomy. Treatment Diagnosis: Generalized weakness      Restrictions  Restrictions/Precautions  Restrictions/Precautions: General Precautions, Fall Risk, Isolation    Subjective   General  Chart Reviewed: Yes  Patient assessed for rehabilitation services?: Yes  Family / Caregiver Present: No  Referring Practitioner: ANA MARIA Nunez  Diagnosis: COVID-19  Subjective  Subjective: Pt unable to report pain at this time. Very confused throughout evaluation. General Comment  Comments: Pt sitting supine in bed with HOB elevated with bilateral wrist restraints on therapist arrival. Agreeable to OT evaluation. Checked with Ayan Pérez RN and nursing manager Iban Ferro RN prior to evaluation. Reported that pt is in restraints secondary to pulling at oxygen, not typically combative. Report that SPO2 stats will drop with movement but pt okay to get up to [facility] to attempt toileting. Social/Functional History  Social/Functional History  Lives With: Daughter  Type of Home: House  Bathroom Shower/Tub: Shower chair with back  Home Equipment: Rolling walker  ADL Assistance: Needs assistance  Homemaking Assistance: Needs assistance  Homemaking Responsibilities: No  Active : No  Additional Comments: Pt very poor historian. Information gathered from medical review and SW note.        Objective   Vision: Within Functional Limits  Hearing: Exceptions to WFL  Hearing Exceptions: Hard of hearing/hearing concerns          Toilet Transfers  Equipment Used: Standard bedside commode  Toilet Transfer: Minimal assistance(2 person)  Toilet Transfers Comments: Pt attempted to complete toileting routine at [facility] with inability to do so. MaxA to doff/christopher brief. ADL  Feeding: Setup  Grooming: Setup;Minimal assistance  UE Bathing: Minimal assistance  LE Bathing: Moderate assistance;Maximum assistance  UE Dressing: Minimal assistance  LE Dressing: Moderate assistance;Maximum assistance  Toileting: Maximum assistance  Additional Comments: Pt transferred to [facility] to attempt to complete toileting ruotine. MaxA from to doff/christopher brief. Bed mobility  Supine to Sit: Moderate assistance  Sit to Supine: Moderate assistance  Scooting: Contact guard assistance  Transfers  Sit Pivot Transfers: Contact guard assistance  Sit to stand: Contact guard assistance;Minimal assistance  Stand to sit: Contact guard assistance;Minimal assistance  Transfer Comments: Increased verbal cueing required for completion of transfers. LUE AROM : WFL  Left Hand AROM: WFL  RUE AROM : WFL  Right Hand AROM: WFL     Plan   Plan  Times per week: 7x/week  Times per day: Daily  Current Treatment Recommendations: Strengthening, Balance Training, Functional Mobility Training, Endurance Training, Self-Care / ADL      AM-[facility] Score  AM-[facility] Inpatient Daily Activity Raw Score: 15 (12/04/20 0920)  AM-[facility] Inpatient ADL T-Scale Score : 34.69 (12/04/20 0920)  ADL Inpatient CMS 0-100% Score: 56.46 (12/04/20 0920)  ADL Inpatient CMS G-Code Modifier : CK (12/04/20 0920)    Goals  Short term goals  Time Frame for Short term goals: 10 visits  Short term goal 1: Pt will engage in greater than 10 minutes BUE therex/theract to improve UB strength for increased ease and independence with ADL and functional transfers.   Short term goal 2: Pt will engage in UB/LB ADL tasks with minimal prompting and Lila with use of AE PRN to improve participation and independence with tasks. Short term goal 3: Pt will demonstrate improved endurance as measured by her ability to tolerate standing for greater than 2 minutes with minimal rest breaks and without LOB.        Therapy Time   Individual Concurrent Group Co-treatment   Time In 0815         Time Out 0835         Minutes DOLORES Whyte

## 2020-12-04 NOTE — PLAN OF CARE
Problem: Airway Clearance - Ineffective  Goal: Achieve or maintain patent airway  12/3/2020 2151 by Orlando Bustillos RN  Outcome: Ongoing  Note: Patient is able to cough and maintain airway. Problem: Gas Exchange - Impaired  Goal: Absence of hypoxia  12/3/2020 2151 by Orlando Bustillos RN  Outcome: Ongoing  Note: Patient SpO2 in low 90s on high flow but desats into the 70s on roomair. Problem: Breathing Pattern - Ineffective  Goal: Ability to achieve and maintain a regular respiratory rate  12/3/2020 2151 by Orlando Bustillos RN  Outcome: Ongoing  Note: Patient breaths are shallow and fast but unlabored. O2 in place. Problem: Body Temperature -  Risk of, Imbalanced  Goal: Ability to maintain a body temperature within defined limits  12/3/2020 2151 by Orlando Bustillos RN  Outcome: Ongoing  Note: Patient is afebrile. Problem: Isolation Precautions - Risk of Spread of Infection  Goal: Prevent transmission of infection  12/3/2020 2151 by Orlando Bustillos RN  Outcome: Ongoing  Note: Patient remains in isolation. Problem: Risk for Fluid Volume Deficit  Goal: Maintain normal heart rhythm  12/3/2020 2151 by Orlando Bustillos RN  Outcome: Ongoing  Note: Patient has a regular heart rhythm. Problem: Fatigue  Goal: Verbalize increase energy and improved vitality  12/3/2020 2151 by Orlando Bustillos RN  Outcome: Ongoing     Problem: Falls - Risk of:  Goal: Will remain free from falls  Description: Will remain free from falls  Outcome: Ongoing  Note: Fall risk assessment done and patient is a high risk for falls. Alarms on as needed for patient safety. Patient being monitored on a regular basis. No falls noted at this time. Problem: Skin Integrity:  Goal: Will show no infection signs and symptoms  Description: Will show no infection signs and symptoms  Outcome: Ongoing  Note: Patient is being turned every two hours with pillows when allowed. No new open areas or redness noted.  Will continue to

## 2020-12-04 NOTE — PROGRESS NOTES
Physical Therapy  Facility/Department: [facility] AT THE [facility]  Daily Treatment Note  NAME: Jay Curry  : 1/10/1931  MRN: [id number]    Date of Service: 2020    Discharge Recommendations:  Continue to assess pending progress, Subacute/Skilled Nursing Facility, ECF with PT        Assessment   Treatment Diagnosis: general debility  PT Education: Goals;PT Role;Plan of Care  Patient Education: Educated pt on above with poor understanding noted. REQUIRES PT FOLLOW UP: Yes  Activity Tolerance  Activity Tolerance: Patient Tolerated treatment well;Patient limited by cognitive status     Patient Diagnosis(es): There were no encounter diagnoses. has a past medical history of Acute renal failure (ARF) ([facility] [address] Anemia, Aortic stenosis, severe, Arthritis, H/O aortic valve replacement, Hx of CABG, Hypertension, Hypothyroidism, Nosebleed, and Osteoporosis. has a past surgical history that includes partial hysterectomy (cervix not removed); Carpal tunnel release; Cardiac catheterization (Left, 14); Cardiac catheterization (Left, 14); Appendectomy; Aortic valve replacement (14); and Hysterectomy. Restrictions  Restrictions/Precautions  Restrictions/Precautions: General Precautions, Fall Risk, Isolation  Subjective   General  Chart Reviewed: Yes  Response To Previous Treatment: Patient unable to report, no changes reported from family or staff  Family / Caregiver Present: No  Referring Practitioner: Cherrie Montes MD  Subjective  Subjective: Pt denies any pain. Orientation  Orientation  Overall Orientation Status: Impaired  Orientation Level: Oriented to person;Disoriented to place; Disoriented to time;Disoriented to situation  Cognition      Objective      Transfers  Sit to Stand: Unable to assess  Stand to sit: Unable to assess  Comment: Held d/t pt agitation.   Ambulation  Ambulation?: No(Held d/t confusion and pt agitation.)        Exercises  Straight Leg Raise: 20x  Heelslides: 20x  Hip Abduction: 20x  Knee Short Arc Quad: 20x  Ankle Pumps: 20x  Comments: Above exercises completed in supine with AAROM. Frequent cues for technique and pt participation. G-Code     OutComes Score    AM-PAC Score    Goals  Short term goals  Time Frame for Short term goals: 10 visits  Short term goal 1: Patient will demonstrate the ability to ambulate 25 feet with RW with minimal assistance in order to ease ADLs  Short term goal 2: Patient will demonstrate the ability to complete bed mobility and transfers with minimal assistance in order to ease functional mobility  Short term goal 3: Patient will demonstrate fair (+) dynamic sitting balance in order to reduce fall risk  Short term goal 4: Patient will tolerate 35-45' ther-ex in order to increase endurance and ease ADLs    Plan    Plan  Times per week: 7 times per week  Times per day: Twice a day(1 time per day on weekends)  Current Treatment Recommendations: Strengthening, Balance Training, Functional Mobility Training, Transfer Training, Gait Training, Neuromuscular Re-education, Home Exercise Program, Safety Education & Training, Patient/Caregiver Education & Training, Equipment Evaluation, Education, & procurement, Manual Therapy - Soft Tissue Mobilization, Positioning  Safety Devices  Type of devices:  All fall risk precautions in place, Bed alarm in place, Call light within reach, Nurse notified, Patient at risk for falls, Left in bed(Pt with soft arm restraints upon entry/exit.)     Therapy Time   Individual Concurrent Group Co-treatment   Time In 1322         Time Out 1351         Minutes [address] [place], [place]

## 2020-12-04 NOTE — PROGRESS NOTES
PT and OT got patient up to the [facility] at this time. Pt did not void at present time. SPO2 did desat into the 70's.  Sandra Eng CNP notified

## 2020-12-04 NOTE — PROGRESS NOTES
Patient assessed and vitals obtained at this time. Pt is alert and orientated to self only. Respirations are tachypneic and unlabored. Lung sounds contain fine crackles in bilateral bases. SPO2 running in the 90's on heated high flow. Pt is calm and cooperative this morning. ROM performed on BUE. Pt offered a drink at this time bur declines. Pt brief is dry and she declines the need to use the [facility]. Will continue to monitor.

## 2020-12-05 NOTE — PROGRESS NOTES
Occupational Therapy  Facility/Department: [facility] AT THE [facility]  Daily Treatment Note  NAME: Yimi Smith  : 1/10/1931  MRN: [id number]    Date of Service: 2020    Discharge Recommendations:  Continue to assess pending progress, Subacute/Skilled Nursing Facility       Assessment      Safety Devices  Safety Devices in place: Yes  Restraints  Initially in place: Yes         Patient Diagnosis(es): There were no encounter diagnoses. has a past medical history of Acute renal failure (ARF) ([facility] [address] Anemia, Aortic stenosis, severe, Arthritis, H/O aortic valve replacement, Hx of CABG, Hypertension, Hypothyroidism, Nosebleed, and Osteoporosis. has a past surgical history that includes partial hysterectomy (cervix not removed); Carpal tunnel release; Cardiac catheterization (Left, 14); Cardiac catheterization (Left, 14); Appendectomy; Aortic valve replacement (14); and Hysterectomy. Restrictions  Restrictions/Precautions  Restrictions/Precautions: General Precautions, Fall Risk, Isolation  Subjective   General  Chart Reviewed: Yes  Patient assessed for rehabilitation services?: Yes  Family / Caregiver Present: No  Referring Practitioner: ANA MARIA Graham  Diagnosis: COVID-19  Subjective  Subjective: Pt unable to report pain at this time. Very confused throughout evaluation. General Comment  Comments: Pt sitting supine in bed with HOB elevated with bilateral wrist restraints on therapist arrival. Agreeable to OT evaluation. Checked with Aaron Alston RN and nursing manager Ector Stevenson RN prior to evaluation. Reported that pt is in restraints secondary to pulling at oxygen, not typically combative. Report that SPO2 stats will drop with movement but pt okay to get up to [facility] to attempt toileting. Vital Signs  Patient Currently in Pain: No   Orientation     Objective    ADL  Grooming: Dependent/Total  Additional Comments: Pt. had eyes closed most of tx. ...demo decreased participation. Plan   Plan  Times per week: 7x/week  Times per day: Daily  Current Treatment Recommendations: Strengthening, Balance Training, Functional Mobility Training, Endurance Training, Self-Care / ADL  G-Code     OutComes Score                                                  AM-PAC Score             Goals  Short term goals  Time Frame for Short term goals: 10 visits  Short term goal 1: Pt will engage in greater than 10 minutes BUE therex/theract to improve UB strength for increased ease and independence with ADL and functional transfers. Short term goal 2: Pt will engage in UB/LB ADL tasks with minimal prompting and Lila with use of AE PRN to improve participation and independence with tasks. Short term goal 3: Pt will demonstrate improved endurance as measured by her ability to tolerate standing for greater than 2 minutes with minimal rest breaks and without LOB.        Therapy Time   Individual Concurrent Group Co-treatment   Time In  12:20         Time Out  12:35         Minutes  15             Lexie BECKER/JOSEFINA [id number]     Madiha Lan

## 2020-12-05 NOTE — PLAN OF CARE
Problem: Airway Clearance - Ineffective  Goal: Achieve or maintain patent airway  12/5/2020 1340 by Shikha Coppola RN  Outcome: Ongoing     Problem: Gas Exchange - Impaired  Goal: Absence of hypoxia  12/5/2020 1340 by Shikha Coppola RN  Outcome: Ongoing     Problem: Gas Exchange - Impaired  Goal: Promote optimal lung function  12/5/2020 1340 by Shikha Coppola RN  Outcome: Ongoing     Problem: Breathing Pattern - Ineffective  Goal: Ability to achieve and maintain a regular respiratory rate  12/5/2020 1340 by Shikha Coppola RN  Outcome: Ongoing     Problem:  Body Temperature -  Risk of, Imbalanced  Goal: Ability to maintain a body temperature within defined limits  12/5/2020 1340 by Shikha Coppola RN  Outcome: Ongoing     Problem: Risk for Fluid Volume Deficit  Goal: Maintain normal heart rhythm  12/5/2020 1340 by Shikha Coppola RN  Outcome: Ongoing     Problem: Risk for Fluid Volume Deficit  Goal: Maintain normal serum potassium, sodium, calcium, phosphorus, and pH  12/5/2020 1340 by Shikha Coppola RN  Outcome: Ongoing     Problem: Fatigue  Goal: Verbalize increase energy and improved vitality  12/5/2020 1340 by Shikha Coppola RN  Outcome: Ongoing     Problem: Falls - Risk of:  Goal: Will remain free from falls  Description: Will remain free from falls  12/5/2020 1340 by Shikha Coppola RN  Outcome: Ongoing     Problem: Skin Integrity:  Goal: Will show no infection signs and symptoms  Description: Will show no infection signs and symptoms  12/5/2020 1340 by Shikha Coppola RN  Outcome: Ongoing     Problem: Skin Integrity:  Goal: Absence of new skin breakdown  Description: Absence of new skin breakdown  12/5/2020 1340 by Shikha Coppola RN  Outcome: Ongoing     Problem: Restraint Use - Nonviolent/Non-Self-Destructive Behavior:  Goal: Absence of restraint-related injury  Description: Absence of restraint-related injury  12/5/2020 1340 by Shikha Coppola RN  Outcome: Ongoing

## 2020-12-05 NOTE — PROGRESS NOTES
Progress Note    SUBJECTIVE:  FU related to  / confused / oxygen noted    OBJECTIVE:    Vitals:   TEMPERATURE:  Current - Temp: 97.5 °F (36.4 °C);  Max - Temp  Av.6 °F (36.4 °C)  Min: 97.2 °F (36.2 °C)  Max: 98.1 °F (36.7 °C)  RESPIRATIONS RANGE: Resp  Av  Min: 12  Max: 26  PULSE RANGE: Pulse  Av.1  Min: 57  Max: 89  BLOOD PRESSURE RANGE:  Systolic (23NLY), EAR:716 , Min:137 , SHANTELLE:438   ; Diastolic (25ZJR), BDU:99, Min:42, Max:99    PULSE OXIMETRY RANGE: SpO2  Av.5 %  Min: 89 %  Max: 96 %  24HR INTAKE/OUTPUT:      Intake/Output Summary (Last 24 hours) at 2020 1028  Last data filed at 2020 0940  Gross per 24 hour   Intake 410 ml   Output 950 ml   Net -540 ml     -----------------------------------------------------------------  Exam:  General: alert  HEENT: Supple neck & negative  Heart: Regular  Lungs: rales bilaterally  Abdomen: Normal & soft, No tenderness and BS normal  Extremities:  No edema   Neuro: NonFocal     -----------------------------------------------------------------  Diagnostic Data:  Lab Results   Component Value Date    WBC 8.1 2020    HGB 10.4 (L) 2020     2020       Lab Results   Component Value Date    BUN 36 (H) 2020    CREATININE 0.81 2020     2020    K 3.9 2020    CALCIUM 8.9 2020     (H) 2020    CO2 23 2020    LABGLOM >60 2020       Lab Results   Component Value Date    WBCUA None 2020    RBCUA None 2020    EPITHUA 0 TO 2 2020    LEUKOCYTESUR NEGATIVE 2020    SPECGRAV 1.025 (H) 2020    GLUCOSEU NEGATIVE 2020    KETUA 1+ (A) 2020    PROTEINU TRACE (A) 2020    HGBUR NEGATIVE 2020    CASTUA NOT REPORTED NOT REPORTED 2020    CRYSTUA NOT REPORTED 2020    BACTERIA TRACE (A) 2020    YEAST NOT REPORTED 2020       Lab Results   Component Value Date    MYOGLOBIN 37 2014    TROPONINT <0.03 2020 CKTOTAL 50 07/03/2014    CKMB 2.9 07/03/2014    PROBNP 2,356 (H) 05/21/2017       Ct Head Wo Contrast    Result Date: 12/2/2020  EXAMINATION: CT HEAD WO CONTRAST HISTORY: Reason for exam:->weakness. COMPARISON: 1/22/2014 TECHNIQUE: CT examination of the head without IV contrast. Dose reduction techniques were achieved by using automated exposure control and/or adjustment of mA and/or kV according to patient size and/or use of iterative reconstruction technique. FINDINGS: Moderate atrophy is notable with patchy decreased attenuation of the periventricular white matter typical of chronic microvascular disease and senescent changes. No intracranial hemorrhage, mass, or cortical edema. There is no fluid in the sinuses, middle ear cavities, or mastoids. A fatty mass is seen in the right parietal scalp compatible with lipoma. This is unchanged. There is atherosclerotic calcification of the paraclinoid internal carotid arteries. 1. Senescent changes in the brain. 2. No intracranial hemorrhage, mass, or other acute findings that would account for weakness. Xr Chest Portable    Result Date: 12/2/2020  EXAM: XR CHEST PORTABLE HISTORY: Reason for exam:->hypoxia, COVID positive. COMPARISON: 6/30/2020. TECHNIQUE: Single view. FINDINGS: Prior mediastinotomy. There are peripheral airspace infiltrates most prominent in the mid lungs, left greater than right. This is new. No pneumothorax, cavity or effusion. Bilateral multifocal infiltrates in the mid lungs, left greater than right. Findings compatible with multifocal pneumonitis. ASSESSMENT:    Principal Problem:    COVID-19 virus detected  Active Problems:    Severe malnutrition (HCC)    Essential hypertension, benign    Chronic renal insufficiency    Chronic atrial fibrillation (HCC)    Acute respiratory failure with hypoxia (HCC)  Resolved Problems:    * No resolved hospital problems.  *      Patient Active Problem List    Diagnosis Date Noted    Severe malnutrition ([facility] 12/03/2020     Priority: Medium     Class: Present on Admission    COVID-19 virus detected 12/02/2020    Acute respiratory failure with hypoxia ([facility] 12/02/2020    Chronic atrial fibrillation ([facility] 02/07/2019    Vitamin D deficiency disease 22/06/8819    Lichen sclerosus of female genitalia 07/23/2015    Hx of CABG 09/22/2014    H/O aortic valve replacement 07/29/2014    Personal history of atrial flutter 07/06/2014    Long term current use of anticoagulant therapy 06/12/2014    History of DVT of lower extremity 06/12/2014    Aortic stenosis, severe 05/27/2014    Anemia, normocytic.  05/27/2014    Chronic renal insufficiency 05/27/2014    Neck pain 05/27/2014    Hypothyroidism 10/22/2011    Migraine with aura 10/22/2011    Agoraphobia 10/22/2011    Rosacea 10/22/2011    Essential hypertension, benign 10/22/2011    Senile osteoporosis 10/22/2011       PLAN:  · Antiviral tx  · Wean oxygen  · Better overall  · Critical Care Time: Diana Whitlock M.D.

## 2020-12-05 NOTE — PROGRESS NOTES
Writer attempted to give pt evening medications at this time. Pt awakened to name called. Pt informed that it was time for medications. Pt verbalized understanding, and went back to sleep. Writer tried for 10 minutes to get pt to take evening medications, would get so far as pt opening mouth for medications then spitting them at [employer]. Pt repositioned for comfort. Call light in reach. Restraints remain in place. Will continue to monitor pt.

## 2020-12-05 NOTE — PLAN OF CARE
Problem: Airway Clearance - Ineffective  Goal: Achieve or maintain patent airway  Outcome: Ongoing  Note: Respirations unlabored at rest with high flow 45L 94%. Continue to monitor     Problem: Gas Exchange - Impaired  Goal: Absence of hypoxia  Outcome: Ongoing  Note: SaO2 93% with high flow maintained. Continue to monitor     Problem: Gas Exchange - Impaired  Goal: Promote optimal lung function  Outcome: Ongoing     Problem: Breathing Pattern - Ineffective  Goal: Ability to achieve and maintain a regular respiratory rate  Outcome: Ongoing  Note: Respirations unlabored. Continue to monitor     Problem: Body Temperature -  Risk of, Imbalanced  Goal: Ability to maintain a body temperature within defined limits  Outcome: Ongoing  Note: Patient afebrile at this time. Continue to monitor     Problem: Body Temperature -  Risk of, Imbalanced  Goal: Will regain or maintain usual level of consciousness  Outcome: Ongoing  Note: Patient alert only to self. Hard to understand speech d/t dentures not in at this time. Continue to monitor     Problem: Body Temperature -  Risk of, Imbalanced  Goal: Complications related to the disease process, condition or treatment will be avoided or minimized  Outcome: Ongoing     Problem: Isolation Precautions - Risk of Spread of Infection  Goal: Prevent transmission of infection  Outcome: Ongoing  Note: Droplet plus isolation maintained. Continue to monitor     Problem: Nutrition Deficits  Goal: Optimize nutrtional status  Outcome: Ongoing     Problem: Risk for Fluid Volume Deficit  Goal: Maintain normal heart rhythm  Outcome: Ongoing  Note: Monitor shows RSR.   Continue to monitor     Problem: Risk for Fluid Volume Deficit  Goal: Maintain absence of muscle cramping  Outcome: Ongoing     Problem: Risk for Fluid Volume Deficit  Goal: Maintain normal serum potassium, sodium, calcium, phosphorus, and pH  Outcome: Ongoing     Problem: Loneliness or Risk for Loneliness  Goal: Demonstrate

## 2020-12-05 NOTE — PROGRESS NOTES
Clinical Pharmacy Note    Warfarin consult follow-up. Date                 INR          Warfarin Dose Given  12/5/2020                      2.0              2 mg  Date Value   12/04/2020 6.3 (HH) vitamin K 2.5 mg po   12/03/2020 4.7 (HH)   12/02/2020 4.3   12/02/2020 4.0   11/27/2020 1.8                   Hemoglobin (g/dL)   Date Value   12/05/2020 10.4 (L)   12/04/2020 11.0 (L)   12/03/2020 11.0 (L)     Hematocrit (%)   Date Value   12/05/2020 33.1 (L)   12/04/2020 34.9 (L)   12/03/2020 34.9 (L)     Platelets (k/uL)   Date Value   12/05/2020 238   12/04/2020 261   12/03/2020 240         Potential Warfarin Drug-Drug Interactions:  Current drug-drug interactions: APAP 650 mg po q6h prn (none given since admission), aspirin EC 81 mg daily (home med), atorvastatin 10 mg nightly(home med),  dexamethasone 6 mg IV daily (started on 12/2-day 4), levothyroxine 75 mcg daily (home med), macrobid 100 mg BID (started on 12/2-day 4), remdesivir 100 mg IV q24 hours (day 4)  Discontinued drug-drug interactions: 12/4/2020 Patient received VITAMIN K 2.5mg PO once      Plan:                 Patient has held warfarin x 3 days since admission due to supratherapeutic INR. Patient was given x 1 dose of vitamin K 2.5 mg po yesterday. INR is therapeutic today, so will restart warfarin 2 mg tonight. Daily PT/INR ordered until stable and therapeutic. Electronically signed by JOSEPH Smith [facility] on 12/5/2020 at 10:45 AM      Thank you for the consult. If you have any questions, [place] [phone].

## 2020-12-06 NOTE — PROGRESS NOTES
Writer called Dr. Kirti Zuniga regarding pt's high blood pressure. Orders received. Will continue to monitor.

## 2020-12-06 NOTE — PROGRESS NOTES
Pt given Ativan 1mg PO due to being very restless and agitated in bed. Pt moving all around and not holding still for BP to read. Pt trying to reach for burgos catheter tubing and high flow canula even with bilateral wrist restraints in place. Call light in reach. Bed alarm on. Side rails up x3. Will continue to monitor.

## 2020-12-06 NOTE — PROGRESS NOTES
Occupational Therapy  Facility/Department: [facility] AT THE [facility]  Daily Treatment Note  NAME: Edi Calles  : 1/10/1931  MRN: [id number]    Date of Service: 2020    Discharge Recommendations:  Continue to assess pending progress, Subacute/Skilled Nursing Facility       Assessment      Safety Devices  Type of devices: Call light within reach;Nurse notified; Bed alarm in place; Left in bed  Restraints  Initially in place: Yes  Restraints: bilateral wrist restraints put back on following OT session. Patient Diagnosis(es): There were no encounter diagnoses. has a past medical history of Acute renal failure (ARF) ([facility] [address] Anemia, Aortic stenosis, severe, Arthritis, H/O aortic valve replacement, Hx of CABG, Hypertension, Hypothyroidism, Nosebleed, and Osteoporosis. has a past surgical history that includes partial hysterectomy (cervix not removed); Carpal tunnel release; Cardiac catheterization (Left, 14); Cardiac catheterization (Left, 14); Appendectomy; Aortic valve replacement (14); and Hysterectomy. Restrictions  Restrictions/Precautions  Restrictions/Precautions: General Precautions, Fall Risk, Isolation  Subjective   General  Chart Reviewed: Yes  Patient assessed for rehabilitation services?: Yes  Response to previous treatment: Patient with no complaints from previous session  Family / Caregiver Present: No  Referring Practitioner: Olena Hashimoto, APRN-CNP  Diagnosis: COVID-19  Subjective  Subjective: Pt unable to report pain at this time. Very confused throughout evaluation. General Comment  Comments: Pt lying in bed with eyes closed and nurse present with bilateral wrist restraints on upon arrival. Wrist restraints are d/t to pt pulling at O2 tubing. OT continuously restless in bed throughout tx session.       Orientation     Objective                                                                Type of ROM/Therapeutic Exercise  Type of ROM/Therapeutic Exercise: PROM  Comment: Pt tolerated BUE PROM x 5 planes x 10 reps x 1 set to increase UE ROM in order to increase Ind with ADL tasks. Pt required occ RBs as needed. Plan   Plan  Times per week: 7x/week  Times per day: Daily  Current Treatment Recommendations: Strengthening, Balance Training, Functional Mobility Training, Endurance Training, Self-Care / ADL  G-Code     OutComes Score                                                  AM-PAC Score             Goals  Short term goals  Time Frame for Short term goals: 10 visits  Short term goal 1: Pt will engage in greater than 10 minutes BUE therex/theract to improve UB strength for increased ease and independence with ADL and functional transfers. Short term goal 2: Pt will engage in UB/LB ADL tasks with minimal prompting and Lila with use of AE PRN to improve participation and independence with tasks. Short term goal 3: Pt will demonstrate improved endurance as measured by her ability to tolerate standing for greater than 2 minutes with minimal rest breaks and without LOB.        Therapy Time   Individual Concurrent Group Co-treatment   Time In 9895         Time Out 1530         Minutes 15                 JESSICA Haskins/JOSEFINA

## 2020-12-06 NOTE — PLAN OF CARE
Writer updated [facility] on patient at this time. Informed daughter that we may need to place patient on Bipap if oxygen saturations continue to drop.

## 2020-12-06 NOTE — PROGRESS NOTES
Writer called Dr. Isiah Santana due to Pt's SpO2 dropping into the low 80's with good waveform. Lady,RT also increased heated high flow up to 60L and 90% so pt is now maxed out on high flow. BP's are also running high. Orders received for BiPAP and prn lopressor.

## 2020-12-06 NOTE — PROGRESS NOTES
Physical Therapy  Facility/Department: [facility] AT THE [facility]  Daily Treatment Note  NAME: Edi Calles  : 1/10/1931  MRN: [id number]    Date of Service: 2020    Discharge Recommendations:  Continue to assess pending progress, Subacute/Skilled Nursing Facility, ECF with PT        Assessment   Treatment Diagnosis: general debility  PT Education: Goals;PT Role;Plan of Care  Patient Education: Educated pt on above with poor understanding noted. REQUIRES PT FOLLOW UP: Yes  Activity Tolerance  Activity Tolerance: Patient limited by cognitive status; Other  Activity Tolerance: Pt limited d/t very lethargic throughout treatment. Patient Diagnosis(es): There were no encounter diagnoses. has a past medical history of Acute renal failure (ARF) ([facility] [address] Anemia, Aortic stenosis, severe, Arthritis, H/O aortic valve replacement, Hx of CABG, Hypertension, Hypothyroidism, Nosebleed, and Osteoporosis. has a past surgical history that includes partial hysterectomy (cervix not removed); Carpal tunnel release; Cardiac catheterization (Left, 14); Cardiac catheterization (Left, 14); Appendectomy; Aortic valve replacement (14); and Hysterectomy. Restrictions  Restrictions/Precautions  Restrictions/Precautions: General Precautions, Fall Risk, Isolation  Subjective   General  Chart Reviewed: Yes  Response To Previous Treatment: Patient unable to report, no changes reported from family or staff  Family / Caregiver Present: No  Referring Practitioner: Norma Lemus MD  Subjective  Subjective: Pt very lethargic this date and unable to report. General Comment  Comments: No pain noted through facial expressions. Orientation  Orientation  Overall Orientation Status: Impaired  Orientation Level: Unable to assess  Cognition      Objective      Transfers  Sit to Stand: Unable to assess  Stand to sit: Unable to assess  Comment: Pt not appropriate to attempt.   Ambulation  Ambulation?: No(Pt not appropriate to attempt.)        Exercises  Straight Leg Raise: 20x2  Heelslides: 20x2  Hip Abduction: 20x2  Knee Short Arc Quad: 20x2  Ankle Pumps: 20x2  Comments: Above exercises completed in supine with AAROM. Max verbal/tactile cues for pt participation. G-Code     OutComes Score    AM-PAC Score    Goals  Short term goals  Time Frame for Short term goals: 10 visits  Short term goal 1: Patient will demonstrate the ability to ambulate 25 feet with RW with minimal assistance in order to ease ADLs  Short term goal 2: Patient will demonstrate the ability to complete bed mobility and transfers with minimal assistance in order to ease functional mobility  Short term goal 3: Patient will demonstrate fair (+) dynamic sitting balance in order to reduce fall risk  Short term goal 4: Patient will tolerate 35-45' ther-ex in order to increase endurance and ease ADLs    Plan    Plan  Times per week: 7 times per week  Times per day: Twice a day(1 time per day on weekends)  Current Treatment Recommendations: Strengthening, Balance Training, Functional Mobility Training, Transfer Training, Gait Training, Neuromuscular Re-education, Home Exercise Program, Safety Education & Training, Patient/Caregiver Education & Training, Equipment Evaluation, Education, & procurement, Manual Therapy - Soft Tissue Mobilization, Positioning  Safety Devices  Type of devices:  All fall risk precautions in place, Bed alarm in place, Call light within reach, Nurse notified, Patient at risk for falls, Left in bed(Pt in soft restraints upon entry/exit.)     Therapy Time   Individual Concurrent Group Co-treatment   Time In 4661         Time Out 1212         Minutes [place], [place]

## 2020-12-06 NOTE — PROGRESS NOTES
Progress Note    SUBJECTIVE:  FU related to  / confused / oxygen noted and higher flow. Limited nutrition / restrained    OBJECTIVE:    Vitals:   TEMPERATURE:  Current - Temp: 98.1 °F (36.7 °C);  Max - Temp  Av.5 °F (36.4 °C)  Min: 97 °F (36.1 °C)  Max: 98.1 °F (36.7 °C)  RESPIRATIONS RANGE: Resp  Av.2  Min: 14  Max: 28  PULSE RANGE: Pulse  Av.7  Min: 59  Max: 95  BLOOD PRESSURE RANGE:  Systolic (04QPV), JMT:829 , Min:124 , UXH:225   ; Diastolic (97OPT), BDF:92, Min:50, Max:93    PULSE OXIMETRY RANGE: SpO2  Av.8 %  Min: 80 %  Max: 96 %  24HR INTAKE/OUTPUT:      Intake/Output Summary (Last 24 hours) at 2020 0905  Last data filed at 2020 0300  Gross per 24 hour   Intake 620 ml   Output 775 ml   Net -155 ml     -----------------------------------------------------------------  Exam:  General: alert  HEENT: Supple neck & negative, right eye redness   Heart: Regular  Lungs: rales bilaterally  Abdomen: Normal & soft, No tenderness and BS normal  Extremities:  No edema   Neuro: NonFocal     -----------------------------------------------------------------  Diagnostic Data:  Lab Results   Component Value Date    WBC 12.6 (H) 2020    HGB 11.6 (L) 2020     2020       Lab Results   Component Value Date    BUN 54 (H) 2020    CREATININE 1.15 (H) 2020     (H) 2020    K 3.5 (L) 2020    CALCIUM 10.0 2020     (H) 2020    CO2 23 2020    LABGLOM 44 (L) 2020       Lab Results   Component Value Date    WBCUA None 2020    RBCUA None 2020    EPITHUA 0 TO 2 2020    LEUKOCYTESUR NEGATIVE 2020    SPECGRAV 1.025 (H) 2020    GLUCOSEU NEGATIVE 2020    KETUA 1+ (A) 2020    PROTEINU TRACE (A) 2020    HGBUR NEGATIVE 2020    CASTUA NOT REPORTED NOT REPORTED 2020    CRYSTUA NOT REPORTED 2020    BACTERIA TRACE (A) 2020    YEAST NOT REPORTED 2020       Lab Results   Component Value Date    MYOGLOBIN 37 06/09/2014    TROPONINT <0.03 12/02/2020    CKTOTAL 50 07/03/2014    CKMB 2.9 07/03/2014    PROBNP 2,356 (H) 05/21/2017       Ct Head Wo Contrast    Result Date: 12/2/2020  EXAMINATION: CT HEAD WO CONTRAST HISTORY: Reason for exam:->weakness. COMPARISON: 1/22/2014 TECHNIQUE: CT examination of the head without IV contrast. Dose reduction techniques were achieved by using automated exposure control and/or adjustment of mA and/or kV according to patient size and/or use of iterative reconstruction technique. FINDINGS: Moderate atrophy is notable with patchy decreased attenuation of the periventricular white matter typical of chronic microvascular disease and senescent changes. No intracranial hemorrhage, mass, or cortical edema. There is no fluid in the sinuses, middle ear cavities, or mastoids. A fatty mass is seen in the right parietal scalp compatible with lipoma. This is unchanged. There is atherosclerotic calcification of the paraclinoid internal carotid arteries. 1. Senescent changes in the brain. 2. No intracranial hemorrhage, mass, or other acute findings that would account for weakness. Xr Chest Portable    Result Date: 12/2/2020  EXAM: XR CHEST PORTABLE HISTORY: Reason for exam:->hypoxia, COVID positive. COMPARISON: 6/30/2020. TECHNIQUE: Single view. FINDINGS: Prior mediastinotomy. There are peripheral airspace infiltrates most prominent in the mid lungs, left greater than right. This is new. No pneumothorax, cavity or effusion. Bilateral multifocal infiltrates in the mid lungs, left greater than right. Findings compatible with multifocal pneumonitis.        ASSESSMENT:    Principal Problem:    COVID-19 virus detected  Active Problems:    Severe malnutrition (HCC)    Essential hypertension, benign    Chronic renal insufficiency    Chronic atrial fibrillation (HCC)    Acute respiratory failure with hypoxia (HCC)  Resolved Problems:    * No resolved hospital problems.  *      Patient Active Problem List    Diagnosis Date Noted    Severe malnutrition ([facility] 12/03/2020     Priority: Medium     Class: Present on Admission    COVID-19 virus detected 12/02/2020    Acute respiratory failure with hypoxia (HCC) 12/02/2020    Chronic atrial fibrillation ([facility] 02/07/2019    Vitamin D deficiency disease 83/63/7723    Lichen sclerosus of female genitalia 07/23/2015    Hx of CABG 09/22/2014    H/O aortic valve replacement 07/29/2014    Personal history of atrial flutter 07/06/2014    Long term current use of anticoagulant therapy 06/12/2014    History of DVT of lower extremity 06/12/2014    Aortic stenosis, severe 05/27/2014    Anemia, normocytic.  05/27/2014    Chronic renal insufficiency 05/27/2014    Neck pain 05/27/2014    Hypothyroidism 10/22/2011    Migraine with aura 10/22/2011    Agoraphobia 10/22/2011    Rosacea 10/22/2011    Essential hypertension, benign 10/22/2011    Senile osteoporosis 10/22/2011       PLAN:  · Antiviral tx  · Wean oxygen  · unchanged  · restraints  · Critical Care Time: Ricarda Marie M.D.

## 2020-12-06 NOTE — PLAN OF CARE
Problem: Gas Exchange - Impaired  Goal: Absence of hypoxia  12/6/2020 0149 by Marla Pham RN  Outcome: Ongoing  Note: Pulse ox WNL. Pt on heated high flow oxygen. Lungs clear and diminished. SOB noted with exertion. Pt denies any discomfort. Will continue to assess. Problem: Body Temperature -  Risk of, Imbalanced  Goal: Ability to maintain a body temperature within defined limits  12/6/2020 0149 by Marla Pham RN  Outcome: Ongoing  Note: No temperature noted. VS stable. Will continue to assess. Problem: Falls - Risk of:  Goal: Will remain free from falls  Description: Will remain free from falls  12/6/2020 0149 by Marla Pham RN  Outcome: Ongoing  Note: Pt is at high risk for falls. Non skid socks on. Bed in low position and wheels locked. Fall sign posted and bracelet on. Siderails up x3. Bed alarm on. Call light within reach. Will continue to assess. Problem: Restraint Use - Nonviolent/Non-Self-Destructive Behavior:  Goal: Absence of restraint indications  Description: Absence of restraint indications  Outcome: Ongoing  Note: Bilateral wrist restraints in use at this time. Monitoring pt every hour. Will continue to monitor.

## 2020-12-07 NOTE — PROGRESS NOTES
PRN lopressor 5mg given for elevated BP. Pt restless when BP cuff goes off. Will continue to monitor.

## 2020-12-07 NOTE — PROGRESS NOTES
restraints removed at this time, pt rolled over onto side and eyes remain closed. Pt only opens eyes when stimulated and closes them quickly. Pt had small loose BM, changed brief, groin very excoriated and small amount of bleeding noted, zinc paste applied. Pt unable to safely take po medications at this time.

## 2020-12-07 NOTE — PROGRESS NOTES
Pt restless in bed, shifting back and forth, pt not pulling at lines at this time. Pt unable to to po medications at this time, updated Lucia Juarez NP, see orders.

## 2020-12-07 NOTE — PLAN OF CARE
Problem: Gas Exchange - Impaired  Goal: Absence of hypoxia  Outcome: Ongoing  Note: Pulse ox WNL. Pt is on heated high flow oxygen. Lungs clear and diminished with fine crackles in the bases. SOB noted with exertion. Will continue to assess. Problem: Body Temperature -  Risk of, Imbalanced  Goal: Ability to maintain a body temperature within defined limits  Outcome: Ongoing  Note: No temperature noted. VS stable. Will continue to assess. Problem: Falls - Risk of:  Goal: Will remain free from falls  Description: Will remain free from falls  Outcome: Ongoing  Note: Pt is at high risk for falls. Non skid socks on. Bed in low position and wheels locked. Fall sign posted and bracelet on. Siderails up x3. Bed alarm on. Call light within reach. Will continue to assess. Problem: Skin Integrity:  Goal: Absence of new skin breakdown  Description: Absence of new skin breakdown  Outcome: Ongoing  Note: Skin assessed at regular intervals. No open areas noted at this time. Pt able to reposition per self. Assistance given if needed. Will continue to assess. Problem: Restraint Use - Nonviolent/Non-Self-Destructive Behavior:  Goal: Absence of restraint-related injury  Description: Absence of restraint-related injury  Outcome: Ongoing  Note: Bilateral wrist restraints in place. No evidence of injury at this time. Will continue to monitor.

## 2020-12-07 NOTE — PROGRESS NOTES
36 (H) 12/06/2020     (H) 12/06/2020    K 3.5 (L) 12/06/2020     (H) 12/06/2020    CREATININE 1.15 (H) 12/06/2020    BUN 54 (H) 12/06/2020    CO2 23 12/06/2020    TSH 1.04 12/02/2020    INR 2.3 12/06/2020    BNP NOT REPORTED 07/13/2014       ASSESSMENT:    Principal Problem:    COVID-19 virus infection  Active Problems:    Severe malnutrition (HCC)    Essential hypertension, benign    Chronic renal insufficiency    Chronic atrial fibrillation (HCC)    Acute respiratory failure with hypoxia (HCC)  Resolved Problems:    * No resolved hospital problems. *      Patient Active Problem List    Diagnosis Date Noted    Severe malnutrition ([address] 12/03/2020     Priority: Medium     Class: Present on Admission    COVID-19 virus infection 12/02/2020    Acute respiratory failure with hypoxia (HCC) 12/02/2020    Chronic atrial fibrillation ([facility] [address] 02/07/2019    Vitamin D deficiency disease 23/31/6755    Lichen sclerosus of female genitalia 07/23/2015    Hx of CABG 09/22/2014    H/O aortic valve replacement 07/29/2014    Personal history of atrial flutter 07/06/2014    Long term current use of anticoagulant therapy 06/12/2014    History of DVT of lower extremity 06/12/2014    Aortic stenosis, severe 05/27/2014    Anemia, normocytic.  05/27/2014    Chronic renal insufficiency 05/27/2014    Neck pain 05/27/2014    Hypothyroidism 10/22/2011    Migraine with aura 10/22/2011    Agoraphobia 10/22/2011    Rosacea 10/22/2011    Essential hypertension, benign 10/22/2011    Senile osteoporosis 10/22/2011       PLAN:  Principal Problem:    COVID-19 virus infection  Active Problems:    Severe malnutrition ([address]    Essential hypertension, benign    Chronic renal insufficiency    Chronic atrial fibrillation (HCC)    Acute respiratory failure with hypoxia (HCC)  Resolved Problems:    * No resolved hospital problems. *    · Covid 19 / Acute Resp Failure  ? Tele with continuous SPO2  ? Dexamethasone  ?  IV Remdeisivir  ? Supplemental oxygen to maintain SPO2 > 92%  ? Acapella  ? Proning  ? Labs daiy  ? Restraints in place to keep oxygen on   ? Appreciate pulmonology  ? Multisystem organ failure  ? I spoke with the family-will make her a [facility] and they will decide on Hospice   · UTI  ?  Continue Macrobid  · High risk medication monitoring: Remdisivir  · Discharge ERICA Awad, NP-C  12/7/2020, 5:02 PM

## 2020-12-07 NOTE — PROGRESS NOTES
Pt code status changed to DNRcc per Sana Arboleda NP orders, she spoke with family about pt status. Sana Arboleda NP instructed that pt will transfer to [facility], Orders to discontinue Heated high flow NC, regular NC applied at 5L. sp02 dropped to 75%, non rebreather applied. Writer attempted to call daughter to have her come visit, unable to get ahold of here, called Kevin Rodgers from palliative care and she will continue to try to get ahold of family. Sp02 increased to 90% on non rebreather.

## 2020-12-07 NOTE — PROGRESS NOTES
PALLIATIVE CARE  Palliative Care has been following patient this admission. Today pt is not improving and Evaristo Darling APRN-CNP to discuss hospice with family. Evaristo Darling spoke with daughter and pt is made comfort care only [facility]).  working on hospice consult.  come to writer and asks me to go to ICU to help ICU staff. High flow was removed per order and O2 sats are low. RT applying supplemental oxygen. Primary nurse requests family be notified that they can come and visit with her. Spoke with pt daughter Mike Paz and update given. Advised may come to see pt one visitor at a time and that she is to bring her own mask. States understanding. Awaiting arrival of family.  notified. Will continue to follow and support. Melly Haro RN, [facility] and Sobeida Martinez Nurse Coordinator  12/7/2020 11:57 AM

## 2020-12-07 NOTE — PROGRESS NOTES
Pt family leaving, states that they will be back in AM. Family aware that pt will transfer out of ICU to [facility], family also aware that cardiac monitor and non rebreather will be removed and NC will be applied. Lidya Chao NP aware that family wants pt to remain comfort care, no hospice plans at this time.

## 2020-12-07 NOTE — PROGRESS NOTES
Non rebreather removed, oxygen applied NC at 6L. Monitors removed per Charlynn Bloch NP. Pt will transfer to [facility]. Nurse supervisor aware.

## 2020-12-07 NOTE — PROGRESS NOTES
Physical Therapy  Facility/Department: [facility] AT THE [facility]  Daily Treatment Note  NAME: Manas Estrada  : 1/10/1931  MRN: [id number]    Date of Service: 2020    Discharge Recommendations:  Continue to assess pending progress, Subacute/Skilled Nursing Facility, ECF with PT        Assessment   Treatment Diagnosis: general debility  PT Education: Goals;PT Role  Patient Education: Educated pt on above with poor understanding noted. REQUIRES PT FOLLOW UP: Yes  Activity Tolerance  Activity Tolerance: Patient limited by cognitive status; Other  Activity Tolerance: Pt limited d/t very lethargic throughout treatment. Patient Diagnosis(es): There were no encounter diagnoses. has a past medical history of Acute renal failure (ARF) ([facility] [address] Anemia, Aortic stenosis, severe, Arthritis, H/O aortic valve replacement, Hx of CABG, Hypertension, Hypothyroidism, Nosebleed, and Osteoporosis. has a past surgical history that includes partial hysterectomy (cervix not removed); Carpal tunnel release; Cardiac catheterization (Left, 14); Cardiac catheterization (Left, 14); Appendectomy; Aortic valve replacement (14); and Hysterectomy. Restrictions  Restrictions/Precautions  Restrictions/Precautions: General Precautions, Fall Risk, Isolation  Subjective   General  Chart Reviewed: Yes  Response To Previous Treatment: Patient unable to report, no changes reported from family or staff  Family / Caregiver Present: No  Referring Practitioner: Stanislaw Lentz MD  Subjective  Subjective: Pt very lethargic this date and unable to report. General Comment  Comments: No pain noted through facial expressions. Assisted pt to reposition in bed.           Orientation  Orientation  Overall Orientation Status: Impaired  Orientation Level: Unable to assess  Cognition      Objective   Bed mobility  Rolling to Left: Minimal assistance  Rolling to Right: Minimal assistance  Transfers  Sit to Stand: Unable to assess  Stand to sit: Unable to assess  Comment: Pt not appropriate to attempt. Ambulation  Ambulation?: No        Exercises  Straight Leg Raise: PROM x 20  Heelslides: PROM x 20  Hip Flexion: PROM x 20  Hip Abduction: PROM x 20  Knee Short Arc Quad: PROM x 20  Ankle Pumps: PROM x 20  Comments: Above exercises completed in supine with PROM. Max verbal/tactile cues for pt participation. No active participation, eyes closed throughout treatment. G-Code     OutComes Score    AM-PAC Score    Goals  Short term goals  Time Frame for Short term goals: 10 visits  Short term goal 1: Patient will demonstrate the ability to ambulate 25 feet with RW with minimal assistance in order to ease ADLs  Short term goal 2: Patient will demonstrate the ability to complete bed mobility and transfers with minimal assistance in order to ease functional mobility  Short term goal 3: Patient will demonstrate fair (+) dynamic sitting balance in order to reduce fall risk  Short term goal 4: Patient will tolerate 35-45' ther-ex in order to increase endurance and ease ADLs    Plan    Plan  Times per week: 7 times per week  Times per day: Twice a day(1 time per day on weekends)  Current Treatment Recommendations: Strengthening, Balance Training, Functional Mobility Training, Transfer Training, Gait Training, Neuromuscular Re-education, Home Exercise Program, Safety Education & Training, Patient/Caregiver Education & Training, Equipment Evaluation, Education, & procurement, Manual Therapy - Soft Tissue Mobilization, Positioning  Safety Devices  Type of devices:  All fall risk precautions in place, Bed alarm in place, Call light within reach, Nurse notified, Patient at risk for falls, Left in bed     Therapy Time   Individual Concurrent Group Co-treatment   Time In 1040         Time Out 8698         [address] [place] [address]

## 2020-12-07 NOTE — PROGRESS NOTES
Physical Therapy  Facility/Department: [facility] AT THE [facility]  Daily Treatment Note  NAME: Robert Delgado  : 1/10/1931  MRN: [id number]    Date of Service: 2020    Discharge Recommendations:  Continue to assess pending progress, Subacute/Skilled Nursing Facility, ECF with PT        Assessment   Treatment Diagnosis: general debility  Decision Making: High Complexity  PT Education: Goals;PT Role  Patient Education: Educated pt on above with poor understanding noted. REQUIRES PT FOLLOW UP: Yes  Activity Tolerance  Activity Tolerance: Patient limited by cognitive status; Other  Activity Tolerance: Pt limited d/t very lethargic throughout treatment. Patient Diagnosis(es): There were no encounter diagnoses. has a past medical history of Acute renal failure (ARF) ([facility] [address] Anemia, Aortic stenosis, severe, Arthritis, H/O aortic valve replacement, Hx of CABG, Hypertension, Hypothyroidism, Nosebleed, and Osteoporosis. has a past surgical history that includes partial hysterectomy (cervix not removed); Carpal tunnel release; Cardiac catheterization (Left, 14); Cardiac catheterization (Left, 14); Appendectomy; Aortic valve replacement (14); and Hysterectomy. Restrictions  Restrictions/Precautions  Restrictions/Precautions: General Precautions, Fall Risk, Isolation  Subjective   General  Chart Reviewed: Yes  Response To Previous Treatment: Patient unable to report, no changes reported from family or staff  Family / Caregiver Present: No  Referring Practitioner: Kaycee Chavez MD  Subjective  Subjective: Pt very lethargic this date and unable to report. Orientation  Orientation  Overall Orientation Status: Impaired  Orientation Level: Unable to assess  Cognition      Objective   Bed mobility  Comment: Pt lethargic, unable to participate with bed mobility  Transfers  Comment: Pt not appropriate to attempt.   Ambulation  Ambulation?: No        Exercises  Straight Leg Raise: PROM x 20  Heelslides: PROM x 20  Hip Flexion: PROM x 20  Hip Abduction: PROM x 20  Knee Passive Range of Motion: PROM x 20  Ankle Pumps: PROM x 20  Comments: Above exercises completed in supine with PROM. Max verbal/tactile cues for pt participation. No active participation, eyes closed for treatment       G-Code     OutComes Score     AM-PAC Score   Goals  Short term goals  Time Frame for Short term goals: 10 visits  Short term goal 1: Patient will demonstrate the ability to ambulate 25 feet with RW with minimal assistance in order to ease ADLs  Short term goal 2: Patient will demonstrate the ability to complete bed mobility and transfers with minimal assistance in order to ease functional mobility  Short term goal 3: Patient will demonstrate fair (+) dynamic sitting balance in order to reduce fall risk  Short term goal 4: Patient will tolerate 35-45' ther-ex in order to increase endurance and ease ADLs    Plan    Plan  Times per week: 7 times per week  Times per day: Twice a day(1 time per day on weekends)  Current Treatment Recommendations: Strengthening, Balance Training, Functional Mobility Training, Transfer Training, Gait Training, Neuromuscular Re-education, Home Exercise Program, Safety Education & Training, Patient/Caregiver Education & Training, Equipment Evaluation, Education, & procurement, Manual Therapy - Soft Tissue Mobilization, Positioning  Safety Devices  Type of devices:  All fall risk precautions in place, Bed alarm in place, Call light within reach, Nurse notified, Patient at risk for falls, Left in bed     Therapy Time   Individual Concurrent Group Co-treatment   Time In 0945         Time Out 1004         Minutes 19                 Erin Krueger, PTA

## 2020-12-07 NOTE — PROGRESS NOTES
PRN Ativan 1mg po given. Pt very restless and shifting around in the bed. Bilateral wrist restraints remain in place. Bed alarm on. Call light in reach. Will continue to monitor.

## 2020-12-07 NOTE — PROGRESS NOTES
[facility] SPECIALTY [facility]  OCCUPATIONAL THERAPY  No Visit Note    [x] ICU    [] Acute   Patient: Areli Suarez  [facility]      Areli Suarez not seen on 12/7/2020 at 1:50 PM due to patient not appropriate for OT tx this afternoon. Requesting to cx tx this date.           Signature: Nelly Magdaleno OTR/JOSEFINA

## 2020-12-08 NOTE — PROGRESS NOTES
Physical Therapy  Facility/Department: [facility] MED SURG  Daily Treatment Note  NAME: Ba Avilez  : 1/10/1931  MRN: [id number]    Date of Service: 2020    Discharge Recommendations:  Continue to assess pending progress, Subacute/Skilled Nursing Facility, ECF with PT        Assessment   Treatment Diagnosis: general debility  PT Education: Goals;PT Role  Patient Education: Educated pt on above with poor understanding noted. REQUIRES PT FOLLOW UP: Yes  Activity Tolerance  Activity Tolerance: Patient limited by cognitive status; Other  Activity Tolerance: Pt limited d/t very lethargic throughout treatment. Patient Diagnosis(es): There were no encounter diagnoses. has a past medical history of Acute renal failure (ARF) ([facility] [address] Anemia, Aortic stenosis, severe, Arthritis, H/O aortic valve replacement, Hx of CABG, Hypertension, Hypothyroidism, Nosebleed, and Osteoporosis. has a past surgical history that includes partial hysterectomy (cervix not removed); Carpal tunnel release; Cardiac catheterization (Left, 14); Cardiac catheterization (Left, 14); Appendectomy; Aortic valve replacement (14); and Hysterectomy. Restrictions  Restrictions/Precautions  Restrictions/Precautions: General Precautions, Fall Risk, Isolation  Subjective   General  Chart Reviewed: Yes  Response To Previous Treatment: Patient unable to report, no changes reported from family or staff  Family / Caregiver Present: No  Referring Practitioner: Kristi Parikh MD  Subjective  Subjective: Pt very lethargic this date and unable to report. General Comment  Comments: No pain noted through facial expressions. Assisted pt to reposition in bed.           Orientation  Orientation  Orientation Level: Unable to assess  Cognition      Objective   Bed mobility  Comment: Pt lethargic, unable to participate with bed mobility  Transfers  Sit to Stand: Unable to assess  Stand to sit: Unable to assess  Comment: Pt not appropriate to attempt. Ambulation  Ambulation?: No        Exercises  Straight Leg Raise: PROM x 20  Heelslides: PROM x 20  Hip Flexion: PROM x 20  Hip Abduction: PROM x 20  Knee Short Arc Quad: PROM x 20  Knee Passive Range of Motion: PROM x 20  Ankle Pumps: PROM x 20  Comments: Above exercises completed in supine with PROM. Max verbal/tactile cues for pt participation. No active participation, eyes closed throughout treatment. Goals  Short term goals  Time Frame for Short term goals: 10 visits  Short term goal 1: Patient will demonstrate the ability to ambulate 25 feet with RW with minimal assistance in order to ease ADLs  Short term goal 2: Patient will demonstrate the ability to complete bed mobility and transfers with minimal assistance in order to ease functional mobility  Short term goal 3: Patient will demonstrate fair (+) dynamic sitting balance in order to reduce fall risk  Short term goal 4: Patient will tolerate 35-45' ther-ex in order to increase endurance and ease ADLs    Plan    Plan  Times per week: 7 times per week  Times per day: Twice a day(1 time per day on weekends)  Current Treatment Recommendations: Strengthening, Balance Training, Functional Mobility Training, Transfer Training, Gait Training, Neuromuscular Re-education, Home Exercise Program, Safety Education & Training, Patient/Caregiver Education & Training, Equipment Evaluation, Education, & procurement, Manual Therapy - Soft Tissue Mobilization, Positioning  Safety Devices  Type of devices:  All fall risk precautions in place, Bed alarm in place, Call light within reach, Nurse notified, Patient at risk for falls, Left in bed     Therapy Time   Individual Concurrent Group Co-treatment   Time In 0931         Time Out 0948         Minutes [address] [place] [address]

## 2020-12-08 NOTE — DISCHARGE SUMMARY
Discharge Summary    Cris Jordan  :  1/10/1931  MRN:  [id number]    Admit date:  2020      Discharge date: 2020     Admitting Physician:  Yoly Casas MD    Discharge Diagnoses:    Principal Problem:    COVID-19 virus infection  Active Problems:    Severe malnutrition ([address]    Essential hypertension, benign    Chronic renal insufficiency    Chronic atrial fibrillation ([facility] [address]    Acute respiratory failure with hypoxia (HCC)  Resolved Problems:    * No resolved hospital problems. *      Hospital Course:   Cris Jordan is a 80 y.o. female admitted with Acute respiratory failure due to Covid 19. She presented to [facility] ER from Dr. Roberto Cortes office with increased weakness and not wanting to eat per the daughter. Per her PCP she is normally very active. Her symptoms were reported to have started on . The patient did report some SOB. She currently was being treated for UTI on Macrobid on admission. She was evaluated in the ER and transferred to us with Covid 19. On admission she was resting in bed with oxygen on pleasantly confused. Afebrile. Denied complaints of pain. Denied n/v/d. However she continued to progress and require higher levels of oxygen to maintain SPO2. Pulmonary was consulted. She began to go into multisystem failure including liver and kidneys. She was having significant ST depression and no responsiveness. She stopped eating and drinking. Conversations were taken place with the family and the decision to make her [address] only was made yesterday. Today she will go home with hospice and her family.      Consultants:  Dr. Roxann Horner, pulmonology    Procedures: none    Complications: Multisystem Failure    Discharge Condition: Terminal    Exam:  CONSTITUTIONAL:  Does not open eyes, restless, does not follow commands  EYES:  Lids and lashes normal, pupils equal, round and reactive to light, extra ocular muscles intact, sclera clear, conjunctiva normal  ENT: Normocephalic, without obvious abnormality, atraumatic, sinuses nontender on palpation, external ears without lesions, oral pharynx with moist mucus membranes, tonsils without erythema or exudates, gums normal and good dentition. NECK:  Supple, symmetrical, trachea midline, no adenopathy, thyroid symmetric, not enlarged and no tenderness, skin normal  HEMATOLOGIC/LYMPHATICS:  no cervical lymphadenopathy and no supraclavicular lymphadenopathy  LUNGS:  no increased work of breathing, decreased air exchange and diminished breath sounds throughout lungs  CARDIOVASCULAR:  Normal apical impulse, regular rate and rhythm, normal S1 and S2, no S3 or S4, and no murmur noted  ABDOMEN:  No scars, normal bowel sounds, soft, non-distended, non-tender, no masses palpated, no hepatosplenomegally  MUSCULOSKELETAL:  There is no redness, warmth, or swelling of the joints. Full range of motion noted. Motor strength is 5 out of 5 all extremities bilaterally.   Tone is normal.  NEUROLOGIC:  Mental Status Exam:  Level of Alertness:   somnolent  Orientation:   Unresponsive to communication  Memory:   abnormal - severe dementia with resp failure  SKIN:  Bruising to BUE  EXT:      no cyanosis, clubbing or edema present      Significant Diagnostic Studies:   Lab Results   Component Value Date    WBC 12.6 (H) 12/06/2020    HGB 11.6 (L) 12/06/2020     12/06/2020       Lab Results   Component Value Date    BUN 54 (H) 12/06/2020    CREATININE 1.15 (H) 12/06/2020     (H) 12/06/2020    K 3.5 (L) 12/06/2020    CALCIUM 10.0 12/06/2020     (H) 12/06/2020    CO2 23 12/06/2020    LABGLOM 44 (L) 12/06/2020       Lab Results   Component Value Date    WBCUA None 12/04/2020    RBCUA None 12/04/2020    EPITHUA 0 TO 2 12/04/2020    LEUKOCYTESUR NEGATIVE 12/04/2020    SPECGRAV 1.025 (H) 12/04/2020    GLUCOSEU NEGATIVE 12/04/2020    KETUA 1+ (A) 12/04/2020    PROTEINU TRACE (A) 12/04/2020    HGBUR NEGATIVE 12/04/2020    CASTUA NOT REPORTED NOT REPORTED 12/04/2020    CRYSTUA NOT REPORTED 12/04/2020    BACTERIA TRACE (A) 12/04/2020    YEAST NOT REPORTED 12/04/2020       Ct Head Wo Contrast    Result Date: 12/2/2020  EXAMINATION: CT HEAD WO CONTRAST HISTORY: Reason for exam:->weakness. COMPARISON: 1/22/2014 TECHNIQUE: CT examination of the head without IV contrast. Dose reduction techniques were achieved by using automated exposure control and/or adjustment of mA and/or kV according to patient size and/or use of iterative reconstruction technique. FINDINGS: Moderate atrophy is notable with patchy decreased attenuation of the periventricular white matter typical of chronic microvascular disease and senescent changes. No intracranial hemorrhage, mass, or cortical edema. There is no fluid in the sinuses, middle ear cavities, or mastoids. A fatty mass is seen in the right parietal scalp compatible with lipoma. This is unchanged. There is atherosclerotic calcification of the paraclinoid internal carotid arteries. 1. Senescent changes in the brain. 2. No intracranial hemorrhage, mass, or other acute findings that would account for weakness. Xr Chest Portable    Result Date: 12/2/2020  EXAM: XR CHEST PORTABLE HISTORY: Reason for exam:->hypoxia, COVID positive. COMPARISON: 6/30/2020. TECHNIQUE: Single view. FINDINGS: Prior mediastinotomy. There are peripheral airspace infiltrates most prominent in the mid lungs, left greater than right. This is new. No pneumothorax, cavity or effusion. Bilateral multifocal infiltrates in the mid lungs, left greater than right. Findings compatible with multifocal pneumonitis.        Assessment and Plan:  Patient Active Problem List    Diagnosis Date Noted    Severe malnutrition ([address] 12/03/2020     Priority: Medium     Class: Present on Admission    COVID-19 virus infection 12/02/2020    Acute respiratory failure with hypoxia ([address] 12/02/2020    Chronic atrial fibrillation ([address] 02/07/2019    Vitamin D deficiency disease 81/72/4312    Lichen sclerosus of female genitalia 07/23/2015    Hx of CABG 09/22/2014    H/O aortic valve replacement 07/29/2014    Personal history of atrial flutter 07/06/2014    Long term current use of anticoagulant therapy 06/12/2014    History of DVT of lower extremity 06/12/2014    Aortic stenosis, severe 05/27/2014    Anemia, normocytic.  05/27/2014    Chronic renal insufficiency 05/27/2014    Neck pain 05/27/2014    Hypothyroidism 10/22/2011    Migraine with aura 10/22/2011    Agoraphobia 10/22/2011    Rosacea 10/22/2011    Essential hypertension, benign 10/22/2011    Senile osteoporosis 10/22/2011        Discharge Medications:       Tony Feliciano   Home Medication Instructions JAVED:[id number]    Printed on:12/08/20 1501   Medication Information                      acetaminophen (TYLENOL) 500 MG tablet  Take 500-1,000 mg by mouth every 6 hours as needed for Pain             ARTIFICIAL TEAR SOLUTION OP  Apply 2 drops to eye 2 times daily as needed (dry eyes)              clobetasol (TEMOVATE) 0.05 % ointment  Apply fingertip amount to perineal/vaginal opening twice a week for lichen sclerosis             docusate sodium (COLACE) 100 MG capsule  Take 100-200 mg by mouth daily as needed for Constipation              sodium chloride (OCEAN, BABY AYR) 0.65 % nasal spray  1 spray by Nasal route as needed for Congestion                 Patient Instructions:    Activity: activity as tolerated  Diet: encourage fluids  Wound Care: none needed  Other: None     Disposition:   DC to home with hospice    Follow up:  Patient will be followed by Maylin Mario MD in 1-2 weeks    CORE MEASURES on Discharge (if applicable)  ACE/ARB in CHF: NA  Statin in MI: NA  ASA in MI: NA  Statin in CVA: NA  Antiplatelet in CVA: NA    Total time spent on discharge services: 45 minutes    Including the following activities:  Evaluation and Management of patient  Discussion with patient and/or surrogate about current care plan  Coordination with Case Management and/or   Coordination of care with Consultants (if applicable)   Coordination of care with Receiving Facility Physician (if applicable)  Completion of DME forms (if applicable)  Preparation of Discharge Summary  Preparation of Medication Reconciliation  Preparation of Discharge Prescriptions    Signed:  ERICA Angela, NP-C  12/8/2020, 3:15 PM

## 2020-12-08 NOTE — PROGRESS NOTES
[facility] SPECIALTY [facility]  OCCUPATIONAL THERAPY  No Visit Note    [] ICU    [x] Acute   Patient: Michel Granados  Room: 69/6100-13      Michel Granados not seen on 12/8/2020 at 2:13 PM due to patient not appropriate for therapy at this time. Per Karrie Cheek, patient is planning to return home on hospice. Per ALYSHA Ansari, patient just received morphine and is now resting quietly in bed. Will hold OT at this time and re-attempt therapy tomorrow as appropriate.           Signature: Chatfield Shock, OTR/L

## 2020-12-08 NOTE — PROGRESS NOTES
Progress Note    SUBJECTIVE:  F/u on decreased responsiveness    OBJECTIVE:      Vitals:   Vitals:    12/08/20 0800   BP: 137/80   Pulse: 124   Resp: 18   Temp: 97.3 °F (36.3 °C)   SpO2: 93%     Weight: 125 lb 9.6 oz (57 kg)   Height: 5' 2\" (157.5 cm)   -----------------------------------------------------------------  Exam:    CONSTITUTIONAL:  Does not open eyes, restless, does not follow commands  EYES:  Lids and lashes normal, pupils equal, round and reactive to light, extra ocular muscles intact, sclera clear, conjunctiva normal  ENT:  Normocephalic, without obvious abnormality, atraumatic, sinuses nontender on palpation, external ears without lesions, oral pharynx with moist mucus membranes, tonsils without erythema or exudates, gums normal and good dentition. NECK:  Supple, symmetrical, trachea midline, no adenopathy, thyroid symmetric, not enlarged and no tenderness, skin normal  HEMATOLOGIC/LYMPHATICS:  no cervical lymphadenopathy and no supraclavicular lymphadenopathy  LUNGS:  no increased work of breathing, decreased air exchange and diminished breath sounds throughout lungs  CARDIOVASCULAR:  Normal apical impulse, regular rate and rhythm, normal S1 and S2, no S3 or S4, and no murmur noted  ABDOMEN:  No scars, normal bowel sounds, soft, non-distended, non-tender, no masses palpated, no hepatosplenomegally  MUSCULOSKELETAL:  There is no redness, warmth, or swelling of the joints. Full range of motion noted. Motor strength is 5 out of 5 all extremities bilaterally.   Tone is normal.  NEUROLOGIC:  Mental Status Exam:  Level of Alertness:   somnolent  Orientation:   Unresponsive to communication  Memory:   abnormal - severe dementia with resp failure  SKIN:  Bruising to BUE  EXT:     no cyanosis, clubbing or edema present    -----------------------------------------------------------------  Diagnostic Data: Reviewed    ASSESSMENT:   Principal Problem:    COVID-19 virus infection  Active Problems:    Severe malnutrition ([facility] [address]    Essential hypertension, benign    Chronic renal insufficiency    Chronic atrial fibrillation (HCC)    Acute respiratory failure with hypoxia (HCC)  Resolved Problems:    * No resolved hospital problems.  *        PLAN:  · Home with Hospice today  · Continue Palliative Care -- [address] [place] [address] , ERICA, NP-C

## 2020-12-08 NOTE — PROGRESS NOTES
Spoke with the daughter Rodrigo Cole about hospice care and home. They are now thinking inpatient hospice and not moving her. They will be in a hour and will talk about options.     NUPUR Trevino

## 2020-12-08 NOTE — PLAN OF CARE
Problem: Falls - Risk of:  Goal: Will remain free from falls  Description: Will remain free from falls  Outcome: Ongoing  Note: Fall assessment completed daily. Bed in lowest position. Call light within reach. Falling star posted to outside of door. Fall risk sticker in place on ID band. Side rails up 2/4. Bed alarm on for safety. Will continue to monitor.      Goal: Absence of physical injury  Description: Absence of physical injury  Outcome: Ongoing

## 2020-12-09 NOTE — TELEPHONE ENCOUNTER
Thanks for the update. I saw she didn't do well in [place] and family wanted comfort care. Sad.

## 2020-12-09 NOTE — TELEPHONE ENCOUNTER
[address] called to let Dr. Wendy Rico know that they had taken Aida Johanne in on their services last night from a discharge from [facility]. The family has elected hospice to follow and they do not expect her to be with them more than a week.     Health Maintenance   Topic Date Due    Annual Wellness Visit (AWV)  05/29/2019    Shingles Vaccine (2 of 2) 10/20/2020    TSH testing  12/02/2021    Potassium monitoring  12/06/2021    Creatinine monitoring  12/06/2021    DTaP/Tdap/Td vaccine (2 - Td) 09/05/2028    Flu vaccine  Completed    Pneumococcal 65+ years Vaccine  Completed    Hepatitis A vaccine  Aged Out    Hepatitis B vaccine  Aged Out    Hib vaccine  Aged Out    Meningococcal (ACWY) vaccine  Aged Out             (applicable per patient's age: Cancer Screenings, Depression Screening, Fall Risk Screening, Immunizations)    LDL Cholesterol (mg/dL)   Date Value   06/30/2020 55     AST (U/L)   Date Value   12/06/2020 36 (H)     ALT (U/L)   Date Value   12/06/2020 13     BUN (mg/dL)   Date Value   12/06/2020 54 (H)      (goal A1C is < 7)   (goal LDL is <100) need 30-50% reduction from baseline     BP Readings from Last 3 Encounters:   12/08/20 137/80   12/02/20 127/79   12/02/20 (!) 100/50    (goal /80)      All Future Testing planned in [facility]:  Lab Frequency Next Occurrence   TSH with Reflex Once 07/16/2021   CBC Auto Differential Once 07/16/2021   Comprehensive Metabolic Panel Once 06/62/0469   Vitamin D 25 Hydroxy Once 07/16/2021   Lipid Panel Once 07/16/2021   Magnesium Once 07/16/2021   EKG 12 Lead Once 07/16/2021   XR CHEST STANDARD (2 VW) Once 07/16/2021   ECHO Complete 2D W Doppler W Color Once 82/22/0105   Basic Metabolic Panel Once 97/70/8651   POCT INR         Next Visit Date:  Future Appointments   Date Time Provider Lily Marinelli   7/6/2021  9:30 AM [facility] ECHO ROOM [facility] ECHO Kalia Levine   7/15/2021  8:30 AM MD Nona Middleton [facility]            Patient Active Problem List:     Hypothyroidism     Migraine with aura     Agoraphobia     Rosacea     Essential hypertension, benign     Senile osteoporosis     Aortic stenosis, severe     Anemia, normocytic.       Chronic renal insufficiency     Neck pain     Long term current use of anticoagulant therapy     History of DVT of lower extremity     Personal history of atrial flutter     H/O aortic valve replacement     Hx of CABG     Lichen sclerosus of female genitalia     Vitamin D deficiency disease     Chronic atrial fibrillation ([address]     COVID-19 virus infection     Acute respiratory failure with hypoxia (HCC)     Severe malnutrition ([address]

## 2022-04-01 NOTE — PROGRESS NOTES
Amilcar Flores CNP notified of critical INR of 6.3. No new orders received at this time. Patient on call light, asking for water. Patient given water. Patient reminded that she is waiting for a bed assignment, denies needs.